# Patient Record
Sex: FEMALE | Race: BLACK OR AFRICAN AMERICAN | NOT HISPANIC OR LATINO
[De-identification: names, ages, dates, MRNs, and addresses within clinical notes are randomized per-mention and may not be internally consistent; named-entity substitution may affect disease eponyms.]

---

## 2017-01-03 ENCOUNTER — MEDICATION RENEWAL (OUTPATIENT)
Age: 82
End: 2017-01-03

## 2017-02-17 ENCOUNTER — MEDICATION RENEWAL (OUTPATIENT)
Age: 82
End: 2017-02-17

## 2017-02-23 ENCOUNTER — LABORATORY RESULT (OUTPATIENT)
Age: 82
End: 2017-02-23

## 2017-02-23 ENCOUNTER — RESULT CHARGE (OUTPATIENT)
Age: 82
End: 2017-02-23

## 2017-02-23 ENCOUNTER — APPOINTMENT (OUTPATIENT)
Dept: INTERNAL MEDICINE | Facility: CLINIC | Age: 82
End: 2017-02-23

## 2017-02-23 VITALS — HEIGHT: 61 IN | BODY MASS INDEX: 21.52 KG/M2 | WEIGHT: 114 LBS

## 2017-02-23 VITALS — DIASTOLIC BLOOD PRESSURE: 60 MMHG | SYSTOLIC BLOOD PRESSURE: 110 MMHG

## 2017-02-23 LAB — SAVE SPECIMEN: NORMAL

## 2017-02-24 LAB
25(OH)D3 SERPL-MCNC: 34.6 NG/ML
ALBUMIN SERPL ELPH-MCNC: 4 G/DL
ALP BLD-CCNC: 80 U/L
ALT SERPL-CCNC: 53 U/L
ANION GAP SERPL CALC-SCNC: 17 MMOL/L
AST SERPL-CCNC: 37 U/L
BASOPHILS # BLD AUTO: 0.01 K/UL
BASOPHILS NFR BLD AUTO: 0.2 %
BILIRUB SERPL-MCNC: 0.9 MG/DL
BUN SERPL-MCNC: 19 MG/DL
CALCIUM SERPL-MCNC: 10.1 MG/DL
CHLORIDE SERPL-SCNC: 101 MMOL/L
CHOLEST SERPL-MCNC: 162 MG/DL
CHOLEST/HDLC SERPL: 2 RATIO
CO2 SERPL-SCNC: 25 MMOL/L
CREAT SERPL-MCNC: 0.69 MG/DL
EOSINOPHIL # BLD AUTO: 0.06 K/UL
EOSINOPHIL NFR BLD AUTO: 1 %
GLUCOSE SERPL-MCNC: 96 MG/DL
HBA1C MFR BLD HPLC: 5.8 %
HCT VFR BLD CALC: 36.5 %
HDLC SERPL-MCNC: 81 MG/DL
HGB BLD-MCNC: 11.6 G/DL
IMM GRANULOCYTES NFR BLD AUTO: 0.2 %
LDLC SERPL CALC-MCNC: 66 MG/DL
LYMPHOCYTES # BLD AUTO: 1.71 K/UL
LYMPHOCYTES NFR BLD AUTO: 28.6 %
MAN DIFF?: NORMAL
MCHC RBC-ENTMCNC: 30.8 PG
MCHC RBC-ENTMCNC: 31.8 GM/DL
MCV RBC AUTO: 96.8 FL
MONOCYTES # BLD AUTO: 0.5 K/UL
MONOCYTES NFR BLD AUTO: 8.4 %
NEUTROPHILS # BLD AUTO: 3.69 K/UL
NEUTROPHILS NFR BLD AUTO: 61.6 %
PLATELET # BLD AUTO: 185 K/UL
POTASSIUM SERPL-SCNC: 4.2 MMOL/L
PROT SERPL-MCNC: 7.4 G/DL
RBC # BLD: 3.77 M/UL
RBC # FLD: 14.4 %
SODIUM SERPL-SCNC: 143 MMOL/L
T3RU NFR SERPL: 1.03 INDEX
T4 SERPL-MCNC: 6.8 UG/DL
TRIGL SERPL-MCNC: 74 MG/DL
TSH SERPL-ACNC: 1.08 UIU/ML
URATE SERPL-MCNC: 3.8 MG/DL
WBC # FLD AUTO: 5.98 K/UL

## 2017-05-25 ENCOUNTER — APPOINTMENT (OUTPATIENT)
Dept: INTERNAL MEDICINE | Facility: CLINIC | Age: 82
End: 2017-05-25

## 2017-05-25 ENCOUNTER — LABORATORY RESULT (OUTPATIENT)
Age: 82
End: 2017-05-25

## 2017-05-25 ENCOUNTER — NON-APPOINTMENT (OUTPATIENT)
Age: 82
End: 2017-05-25

## 2017-05-25 VITALS — HEIGHT: 61 IN | WEIGHT: 112 LBS | BODY MASS INDEX: 21.14 KG/M2

## 2017-05-25 VITALS — DIASTOLIC BLOOD PRESSURE: 60 MMHG | SYSTOLIC BLOOD PRESSURE: 120 MMHG

## 2017-05-26 LAB
25(OH)D3 SERPL-MCNC: 32.3 NG/ML
ALBUMIN SERPL ELPH-MCNC: 4.4 G/DL
ALP BLD-CCNC: 70 U/L
ALT SERPL-CCNC: 20 U/L
ANION GAP SERPL CALC-SCNC: 20 MMOL/L
AST SERPL-CCNC: 25 U/L
BASOPHILS # BLD AUTO: 0.01 K/UL
BASOPHILS NFR BLD AUTO: 0.2 %
BILIRUB SERPL-MCNC: 0.8 MG/DL
BUN SERPL-MCNC: 19 MG/DL
CALCIUM SERPL-MCNC: 9.7 MG/DL
CHLORIDE SERPL-SCNC: 100 MMOL/L
CHOLEST SERPL-MCNC: 163 MG/DL
CHOLEST/HDLC SERPL: 1.9 RATIO
CO2 SERPL-SCNC: 23 MMOL/L
CREAT SERPL-MCNC: 0.65 MG/DL
EOSINOPHIL # BLD AUTO: 0.07 K/UL
EOSINOPHIL NFR BLD AUTO: 1.3 %
FERRITIN SERPL-MCNC: 80 NG/ML
GLUCOSE SERPL-MCNC: 88 MG/DL
HBA1C MFR BLD HPLC: 5.9 %
HCT VFR BLD CALC: 39.6 %
HDLC SERPL-MCNC: 85 MG/DL
HGB BLD-MCNC: 12.5 G/DL
IMM GRANULOCYTES NFR BLD AUTO: 0.2 %
LDLC SERPL CALC-MCNC: 61 MG/DL
LYMPHOCYTES # BLD AUTO: 1.37 K/UL
LYMPHOCYTES NFR BLD AUTO: 24.6 %
MAN DIFF?: NORMAL
MCHC RBC-ENTMCNC: 31.3 PG
MCHC RBC-ENTMCNC: 31.6 GM/DL
MCV RBC AUTO: 99.2 FL
MONOCYTES # BLD AUTO: 0.35 K/UL
MONOCYTES NFR BLD AUTO: 6.3 %
NEUTROPHILS # BLD AUTO: 3.76 K/UL
NEUTROPHILS NFR BLD AUTO: 67.4 %
PLATELET # BLD AUTO: 184 K/UL
POTASSIUM SERPL-SCNC: 4.4 MMOL/L
PROT SERPL-MCNC: 7.7 G/DL
RBC # BLD: 3.99 M/UL
RBC # FLD: 14.3 %
SAVE SPECIMEN: NORMAL
SODIUM SERPL-SCNC: 143 MMOL/L
T3RU NFR SERPL: 1.08 INDEX
T4 SERPL-MCNC: 6.4 UG/DL
TRIGL SERPL-MCNC: 87 MG/DL
TSH SERPL-ACNC: 0.83 UIU/ML
URATE SERPL-MCNC: 3.8 MG/DL
VIT B12 SERPL-MCNC: 804 PG/ML
WBC # FLD AUTO: 5.57 K/UL

## 2017-06-20 ENCOUNTER — MEDICATION RENEWAL (OUTPATIENT)
Age: 82
End: 2017-06-20

## 2017-06-23 ENCOUNTER — APPOINTMENT (OUTPATIENT)
Dept: CARDIOLOGY | Facility: CLINIC | Age: 82
End: 2017-06-23

## 2017-08-18 ENCOUNTER — RX RENEWAL (OUTPATIENT)
Age: 82
End: 2017-08-18

## 2017-09-12 ENCOUNTER — LABORATORY RESULT (OUTPATIENT)
Age: 82
End: 2017-09-12

## 2017-09-12 ENCOUNTER — NON-APPOINTMENT (OUTPATIENT)
Age: 82
End: 2017-09-12

## 2017-09-12 ENCOUNTER — APPOINTMENT (OUTPATIENT)
Dept: INTERNAL MEDICINE | Facility: CLINIC | Age: 82
End: 2017-09-12
Payer: MEDICARE

## 2017-09-12 VITALS — SYSTOLIC BLOOD PRESSURE: 120 MMHG | DIASTOLIC BLOOD PRESSURE: 60 MMHG

## 2017-09-12 VITALS — WEIGHT: 113 LBS | BODY MASS INDEX: 20.8 KG/M2 | HEIGHT: 62 IN

## 2017-09-12 PROCEDURE — 99214 OFFICE O/P EST MOD 30 MIN: CPT | Mod: 25

## 2017-09-12 PROCEDURE — 36415 COLL VENOUS BLD VENIPUNCTURE: CPT

## 2017-09-12 PROCEDURE — 93000 ELECTROCARDIOGRAM COMPLETE: CPT

## 2017-09-13 ENCOUNTER — RESULT CHARGE (OUTPATIENT)
Age: 82
End: 2017-09-13

## 2017-09-13 LAB
25(OH)D3 SERPL-MCNC: 35 NG/ML
ALBUMIN SERPL ELPH-MCNC: 4.3 G/DL
ALP BLD-CCNC: 67 U/L
ALT SERPL-CCNC: 18 U/L
ANION GAP SERPL CALC-SCNC: 15 MMOL/L
AST SERPL-CCNC: 26 U/L
BASOPHILS # BLD AUTO: 0.01 K/UL
BASOPHILS NFR BLD AUTO: 0.2 %
BILIRUB SERPL-MCNC: 0.9 MG/DL
BILIRUB UR QL STRIP: NORMAL
BUN SERPL-MCNC: 17 MG/DL
CALCIUM SERPL-MCNC: 9.9 MG/DL
CHLORIDE SERPL-SCNC: 101 MMOL/L
CHOLEST SERPL-MCNC: 169 MG/DL
CHOLEST/HDLC SERPL: 1.9 RATIO
CLARITY UR: CLEAR
CO2 SERPL-SCNC: 26 MMOL/L
COLLECTION METHOD: NORMAL
CREAT SERPL-MCNC: 0.81 MG/DL
EOSINOPHIL # BLD AUTO: 0.06 K/UL
EOSINOPHIL NFR BLD AUTO: 1 %
GLUCOSE SERPL-MCNC: 100 MG/DL
GLUCOSE UR-MCNC: NORMAL
HBA1C MFR BLD HPLC: 5.6 %
HCG UR QL: 0.2 EU/DL
HCT VFR BLD CALC: 38.5 %
HDLC SERPL-MCNC: 89 MG/DL
HGB BLD-MCNC: 12 G/DL
HGB UR QL STRIP.AUTO: NORMAL
IMM GRANULOCYTES NFR BLD AUTO: 0.2 %
KETONES UR-MCNC: NORMAL
LDLC SERPL CALC-MCNC: 69 MG/DL
LEUKOCYTE ESTERASE UR QL STRIP: NORMAL
LYMPHOCYTES # BLD AUTO: 1.82 K/UL
LYMPHOCYTES NFR BLD AUTO: 30.4 %
MAN DIFF?: NORMAL
MCHC RBC-ENTMCNC: 30.8 PG
MCHC RBC-ENTMCNC: 31.2 GM/DL
MCV RBC AUTO: 99 FL
MONOCYTES # BLD AUTO: 0.68 K/UL
MONOCYTES NFR BLD AUTO: 11.4 %
NEUTROPHILS # BLD AUTO: 3.4 K/UL
NEUTROPHILS NFR BLD AUTO: 56.8 %
NITRITE UR QL STRIP: NORMAL
PH UR STRIP: 5.5
PLATELET # BLD AUTO: 166 K/UL
POTASSIUM SERPL-SCNC: 5 MMOL/L
PROT SERPL-MCNC: 7.6 G/DL
PROT UR STRIP-MCNC: NORMAL
RBC # BLD: 3.89 M/UL
RBC # FLD: 14.4 %
SAVE SPECIMEN: NORMAL
SODIUM SERPL-SCNC: 142 MMOL/L
SP GR UR STRIP: 1.01
T3RU NFR SERPL: 1.02 INDEX
T4 SERPL-MCNC: 7.1 UG/DL
TRIGL SERPL-MCNC: 56 MG/DL
TSH SERPL-ACNC: 1.2 UIU/ML
URATE SERPL-MCNC: 4.7 MG/DL
WBC # FLD AUTO: 5.98 K/UL

## 2017-10-16 ENCOUNTER — APPOINTMENT (OUTPATIENT)
Dept: INTERNAL MEDICINE | Facility: CLINIC | Age: 82
End: 2017-10-16
Payer: MEDICARE

## 2017-10-16 VITALS — SYSTOLIC BLOOD PRESSURE: 120 MMHG | DIASTOLIC BLOOD PRESSURE: 70 MMHG

## 2017-10-16 VITALS — WEIGHT: 111 LBS | BODY MASS INDEX: 20.96 KG/M2 | HEIGHT: 61 IN

## 2017-10-16 PROCEDURE — G0008: CPT

## 2017-10-16 PROCEDURE — 99214 OFFICE O/P EST MOD 30 MIN: CPT | Mod: 25

## 2017-10-16 PROCEDURE — 90686 IIV4 VACC NO PRSV 0.5 ML IM: CPT

## 2017-10-17 ENCOUNTER — MEDICATION RENEWAL (OUTPATIENT)
Age: 82
End: 2017-10-17

## 2017-10-17 ENCOUNTER — RX RENEWAL (OUTPATIENT)
Age: 82
End: 2017-10-17

## 2017-10-17 RX ORDER — LORAZEPAM 0.5 MG/1
0.5 TABLET ORAL AT BEDTIME
Refills: 0 | Status: DISCONTINUED | COMMUNITY
Start: 2017-02-23 | End: 2017-10-17

## 2017-10-17 RX ORDER — LORAZEPAM 0.5 MG/1
0.5 TABLET ORAL TWICE DAILY
Qty: 60 | Refills: 0 | Status: DISCONTINUED | COMMUNITY
Start: 2017-06-20 | End: 2017-10-17

## 2017-11-09 ENCOUNTER — RESULT CHARGE (OUTPATIENT)
Age: 82
End: 2017-11-09

## 2017-11-09 ENCOUNTER — APPOINTMENT (OUTPATIENT)
Dept: INTERNAL MEDICINE | Facility: CLINIC | Age: 82
End: 2017-11-09
Payer: MEDICARE

## 2017-11-09 ENCOUNTER — LABORATORY RESULT (OUTPATIENT)
Age: 82
End: 2017-11-09

## 2017-11-09 ENCOUNTER — NON-APPOINTMENT (OUTPATIENT)
Age: 82
End: 2017-11-09

## 2017-11-09 VITALS — DIASTOLIC BLOOD PRESSURE: 70 MMHG | SYSTOLIC BLOOD PRESSURE: 130 MMHG

## 2017-11-09 VITALS — HEIGHT: 61 IN | WEIGHT: 113 LBS | BODY MASS INDEX: 21.34 KG/M2

## 2017-11-09 PROCEDURE — 36415 COLL VENOUS BLD VENIPUNCTURE: CPT

## 2017-11-09 PROCEDURE — 81003 URINALYSIS AUTO W/O SCOPE: CPT | Mod: QW

## 2017-11-09 PROCEDURE — 99214 OFFICE O/P EST MOD 30 MIN: CPT | Mod: 25

## 2017-11-09 PROCEDURE — 93000 ELECTROCARDIOGRAM COMPLETE: CPT

## 2017-11-09 RX ORDER — LORAZEPAM 0.5 MG/1
0.5 TABLET ORAL TWICE DAILY
Qty: 60 | Refills: 0 | Status: DISCONTINUED | COMMUNITY
Start: 2017-10-17 | End: 2017-11-09

## 2017-11-10 LAB
25(OH)D3 SERPL-MCNC: 39.9 NG/ML
ALBUMIN SERPL ELPH-MCNC: 4.3 G/DL
ALP BLD-CCNC: 62 U/L
ALT SERPL-CCNC: 32 U/L
ANION GAP SERPL CALC-SCNC: 16 MMOL/L
APPEARANCE: CLEAR
AST SERPL-CCNC: 47 U/L
BACTERIA: NEGATIVE
BASOPHILS # BLD AUTO: 0.01 K/UL
BASOPHILS NFR BLD AUTO: 0.2 %
BILIRUB SERPL-MCNC: 0.6 MG/DL
BILIRUBIN URINE: NEGATIVE
BLOOD URINE: NEGATIVE
BUN SERPL-MCNC: 16 MG/DL
CALCIUM SERPL-MCNC: 9.8 MG/DL
CHLORIDE SERPL-SCNC: 99 MMOL/L
CHOLEST SERPL-MCNC: 174 MG/DL
CHOLEST/HDLC SERPL: 1.9 RATIO
CO2 SERPL-SCNC: 26 MMOL/L
COLOR: YELLOW
CREAT SERPL-MCNC: 0.63 MG/DL
EOSINOPHIL # BLD AUTO: 0.04 K/UL
EOSINOPHIL NFR BLD AUTO: 0.8 %
FOLATE SERPL-MCNC: >20 NG/ML
GLUCOSE QUALITATIVE U: NEGATIVE MG/DL
GLUCOSE SERPL-MCNC: 92 MG/DL
HBA1C MFR BLD HPLC: 5.7 %
HCT VFR BLD CALC: 39.5 %
HDLC SERPL-MCNC: 91 MG/DL
HGB BLD-MCNC: 12.6 G/DL
HYALINE CASTS: 1 /LPF
IMM GRANULOCYTES NFR BLD AUTO: 0.2 %
KETONES URINE: NEGATIVE
LDLC SERPL CALC-MCNC: 74 MG/DL
LEUKOCYTE ESTERASE URINE: NEGATIVE
LYMPHOCYTES # BLD AUTO: 1.26 K/UL
LYMPHOCYTES NFR BLD AUTO: 23.6 %
MAN DIFF?: NORMAL
MCHC RBC-ENTMCNC: 31.9 GM/DL
MCHC RBC-ENTMCNC: 32.1 PG
MCV RBC AUTO: 100.8 FL
MICROSCOPIC-UA: NORMAL
MONOCYTES # BLD AUTO: 0.47 K/UL
MONOCYTES NFR BLD AUTO: 8.8 %
NEUTROPHILS # BLD AUTO: 3.54 K/UL
NEUTROPHILS NFR BLD AUTO: 66.4 %
NITRITE URINE: NEGATIVE
PH URINE: 7
PLATELET # BLD AUTO: 166 K/UL
POTASSIUM SERPL-SCNC: 4.3 MMOL/L
PROT SERPL-MCNC: 7.7 G/DL
PROTEIN URINE: NEGATIVE MG/DL
RBC # BLD: 3.92 M/UL
RBC # FLD: 14 %
RED BLOOD CELLS URINE: 1 /HPF
SAVE SPECIMEN: NORMAL
SODIUM SERPL-SCNC: 141 MMOL/L
SPECIFIC GRAVITY URINE: 1.01
SQUAMOUS EPITHELIAL CELLS: 0 /HPF
T3RU NFR SERPL: 1.07 INDEX
T4 SERPL-MCNC: 6.6 UG/DL
TRIGL SERPL-MCNC: 47 MG/DL
TSH SERPL-ACNC: 1.18 UIU/ML
URATE SERPL-MCNC: 3.6 MG/DL
UROBILINOGEN URINE: NEGATIVE MG/DL
VIT B12 SERPL-MCNC: 852 PG/ML
WBC # FLD AUTO: 5.33 K/UL
WHITE BLOOD CELLS URINE: 0 /HPF

## 2017-11-15 ENCOUNTER — RX RENEWAL (OUTPATIENT)
Age: 82
End: 2017-11-15

## 2017-11-16 ENCOUNTER — MEDICATION RENEWAL (OUTPATIENT)
Age: 82
End: 2017-11-16

## 2017-11-22 ENCOUNTER — EMERGENCY (EMERGENCY)
Facility: HOSPITAL | Age: 82
LOS: 1 days | Discharge: HOME | End: 2017-11-22
Attending: EMERGENCY MEDICINE | Admitting: EMERGENCY MEDICINE
Payer: MEDICARE

## 2017-11-22 VITALS
HEART RATE: 78 BPM | SYSTOLIC BLOOD PRESSURE: 114 MMHG | DIASTOLIC BLOOD PRESSURE: 68 MMHG | TEMPERATURE: 98 F | RESPIRATION RATE: 20 BRPM | OXYGEN SATURATION: 100 %

## 2017-11-22 VITALS — RESPIRATION RATE: 18 BRPM | SYSTOLIC BLOOD PRESSURE: 144 MMHG | DIASTOLIC BLOOD PRESSURE: 82 MMHG | HEART RATE: 76 BPM

## 2017-11-22 DIAGNOSIS — Z98.89 OTHER SPECIFIED POSTPROCEDURAL STATES: Chronic | ICD-10-CM

## 2017-11-22 LAB
ANION GAP SERPL CALC-SCNC: 16 MMOL/L — SIGNIFICANT CHANGE UP (ref 5–17)
APPEARANCE UR: CLEAR — SIGNIFICANT CHANGE UP
APTT BLD: 28.4 SEC — SIGNIFICANT CHANGE UP (ref 27.5–37.4)
BASOPHILS # BLD AUTO: 0 K/UL — SIGNIFICANT CHANGE UP (ref 0–0.2)
BASOPHILS NFR BLD AUTO: 0.2 % — SIGNIFICANT CHANGE UP (ref 0–2)
BILIRUB UR-MCNC: NEGATIVE — SIGNIFICANT CHANGE UP
BUN SERPL-MCNC: 15 MG/DL — SIGNIFICANT CHANGE UP (ref 7–23)
CALCIUM SERPL-MCNC: 9.9 MG/DL — SIGNIFICANT CHANGE UP (ref 8.4–10.5)
CHLORIDE SERPL-SCNC: 99 MMOL/L — SIGNIFICANT CHANGE UP (ref 96–108)
CO2 SERPL-SCNC: 25 MMOL/L — SIGNIFICANT CHANGE UP (ref 22–31)
COLOR SPEC: COLORLESS — SIGNIFICANT CHANGE UP
CREAT SERPL-MCNC: 0.52 MG/DL — SIGNIFICANT CHANGE UP (ref 0.5–1.3)
DIFF PNL FLD: NEGATIVE — SIGNIFICANT CHANGE UP
EOSINOPHIL # BLD AUTO: 0 K/UL — SIGNIFICANT CHANGE UP (ref 0–0.5)
EOSINOPHIL NFR BLD AUTO: 0.6 % — SIGNIFICANT CHANGE UP (ref 0–6)
GLUCOSE SERPL-MCNC: 107 MG/DL — HIGH (ref 70–99)
GLUCOSE UR QL: NEGATIVE — SIGNIFICANT CHANGE UP
HCT VFR BLD CALC: 40.5 % — SIGNIFICANT CHANGE UP (ref 34.5–45)
HGB BLD-MCNC: 13.2 G/DL — SIGNIFICANT CHANGE UP (ref 11.5–15.5)
INR BLD: 1.08 RATIO — SIGNIFICANT CHANGE UP (ref 0.88–1.16)
KETONES UR-MCNC: NEGATIVE — SIGNIFICANT CHANGE UP
LEUKOCYTE ESTERASE UR-ACNC: NEGATIVE — SIGNIFICANT CHANGE UP
LYMPHOCYTES # BLD AUTO: 1.1 K/UL — SIGNIFICANT CHANGE UP (ref 1–3.3)
LYMPHOCYTES # BLD AUTO: 25.2 % — SIGNIFICANT CHANGE UP (ref 13–44)
MCHC RBC-ENTMCNC: 32.6 GM/DL — SIGNIFICANT CHANGE UP (ref 32–36)
MCHC RBC-ENTMCNC: 33.2 PG — SIGNIFICANT CHANGE UP (ref 27–34)
MCV RBC AUTO: 102 FL — HIGH (ref 80–100)
MONOCYTES # BLD AUTO: 0.4 K/UL — SIGNIFICANT CHANGE UP (ref 0–0.9)
MONOCYTES NFR BLD AUTO: 9 % — SIGNIFICANT CHANGE UP (ref 2–14)
NEUTROPHILS # BLD AUTO: 2.9 K/UL — SIGNIFICANT CHANGE UP (ref 1.8–7.4)
NEUTROPHILS NFR BLD AUTO: 64.9 % — SIGNIFICANT CHANGE UP (ref 43–77)
NITRITE UR-MCNC: NEGATIVE — SIGNIFICANT CHANGE UP
PH UR: 7.5 — SIGNIFICANT CHANGE UP (ref 5–8)
PLATELET # BLD AUTO: 161 K/UL — SIGNIFICANT CHANGE UP (ref 150–400)
POTASSIUM SERPL-MCNC: 4.4 MMOL/L — SIGNIFICANT CHANGE UP (ref 3.5–5.3)
POTASSIUM SERPL-SCNC: 4.4 MMOL/L — SIGNIFICANT CHANGE UP (ref 3.5–5.3)
PROT UR-MCNC: NEGATIVE — SIGNIFICANT CHANGE UP
PROTHROM AB SERPL-ACNC: 11.8 SEC — SIGNIFICANT CHANGE UP (ref 9.8–12.7)
RBC # BLD: 3.98 M/UL — SIGNIFICANT CHANGE UP (ref 3.8–5.2)
RBC # FLD: 11.7 % — SIGNIFICANT CHANGE UP (ref 10.3–14.5)
SODIUM SERPL-SCNC: 140 MMOL/L — SIGNIFICANT CHANGE UP (ref 135–145)
SP GR SPEC: 1.01 — LOW (ref 1.01–1.02)
UROBILINOGEN FLD QL: NEGATIVE — SIGNIFICANT CHANGE UP
WBC # BLD: 4.5 K/UL — SIGNIFICANT CHANGE UP (ref 3.8–10.5)
WBC # FLD AUTO: 4.5 K/UL — SIGNIFICANT CHANGE UP (ref 3.8–10.5)
WBC UR QL: SIGNIFICANT CHANGE UP /HPF (ref 0–5)

## 2017-11-22 PROCEDURE — 85027 COMPLETE CBC AUTOMATED: CPT

## 2017-11-22 PROCEDURE — 73564 X-RAY EXAM KNEE 4 OR MORE: CPT | Mod: 26,LT

## 2017-11-22 PROCEDURE — 73552 X-RAY EXAM OF FEMUR 2/>: CPT

## 2017-11-22 PROCEDURE — 85730 THROMBOPLASTIN TIME PARTIAL: CPT

## 2017-11-22 PROCEDURE — 70450 CT HEAD/BRAIN W/O DYE: CPT

## 2017-11-22 PROCEDURE — 93010 ELECTROCARDIOGRAM REPORT: CPT

## 2017-11-22 PROCEDURE — 73552 X-RAY EXAM OF FEMUR 2/>: CPT | Mod: 26,LT

## 2017-11-22 PROCEDURE — 85610 PROTHROMBIN TIME: CPT

## 2017-11-22 PROCEDURE — 71010: CPT | Mod: 26

## 2017-11-22 PROCEDURE — 71045 X-RAY EXAM CHEST 1 VIEW: CPT

## 2017-11-22 PROCEDURE — 93005 ELECTROCARDIOGRAM TRACING: CPT

## 2017-11-22 PROCEDURE — 96374 THER/PROPH/DIAG INJ IV PUSH: CPT

## 2017-11-22 PROCEDURE — 99285 EMERGENCY DEPT VISIT HI MDM: CPT | Mod: 25

## 2017-11-22 PROCEDURE — 99284 EMERGENCY DEPT VISIT MOD MDM: CPT | Mod: 25

## 2017-11-22 PROCEDURE — 80048 BASIC METABOLIC PNL TOTAL CA: CPT

## 2017-11-22 PROCEDURE — 72170 X-RAY EXAM OF PELVIS: CPT

## 2017-11-22 PROCEDURE — 96375 TX/PRO/DX INJ NEW DRUG ADDON: CPT

## 2017-11-22 PROCEDURE — 81001 URINALYSIS AUTO W/SCOPE: CPT

## 2017-11-22 PROCEDURE — 70450 CT HEAD/BRAIN W/O DYE: CPT | Mod: 26

## 2017-11-22 PROCEDURE — 72170 X-RAY EXAM OF PELVIS: CPT | Mod: 26

## 2017-11-22 PROCEDURE — 73564 X-RAY EXAM KNEE 4 OR MORE: CPT

## 2017-11-22 RX ORDER — TRAMADOL HYDROCHLORIDE 50 MG/1
1 TABLET ORAL
Qty: 12 | Refills: 0 | OUTPATIENT
Start: 2017-11-22 | End: 2017-11-25

## 2017-11-22 RX ORDER — MORPHINE SULFATE 50 MG/1
2 CAPSULE, EXTENDED RELEASE ORAL ONCE
Qty: 0 | Refills: 0 | Status: DISCONTINUED | OUTPATIENT
Start: 2017-11-22 | End: 2017-11-22

## 2017-11-22 RX ORDER — TRAMADOL HYDROCHLORIDE 50 MG/1
50 TABLET ORAL ONCE
Qty: 0 | Refills: 0 | Status: DISCONTINUED | OUTPATIENT
Start: 2017-11-22 | End: 2017-11-22

## 2017-11-22 RX ORDER — ACETAMINOPHEN 500 MG
1000 TABLET ORAL ONCE
Qty: 0 | Refills: 0 | Status: COMPLETED | OUTPATIENT
Start: 2017-11-22 | End: 2017-11-22

## 2017-11-22 RX ADMIN — Medication 400 MILLIGRAM(S): at 19:45

## 2017-11-22 RX ADMIN — Medication 1000 MILLIGRAM(S): at 20:44

## 2017-11-22 RX ADMIN — TRAMADOL HYDROCHLORIDE 50 MILLIGRAM(S): 50 TABLET ORAL at 23:50

## 2017-11-22 RX ADMIN — MORPHINE SULFATE 2 MILLIGRAM(S): 50 CAPSULE, EXTENDED RELEASE ORAL at 19:53

## 2017-11-22 RX ADMIN — TRAMADOL HYDROCHLORIDE 50 MILLIGRAM(S): 50 TABLET ORAL at 23:52

## 2017-11-22 RX ADMIN — MORPHINE SULFATE 2 MILLIGRAM(S): 50 CAPSULE, EXTENDED RELEASE ORAL at 20:45

## 2017-11-22 NOTE — ED PROVIDER NOTE - OBJECTIVE STATEMENT
87 yo female presenting after mechanical fall x 2 today.  first fall was in the afternoon, patient stumbled while trying to get into [facility], fell on the ground on her buttocks however got up right away and was able to continue walking.  second fall happened around 530 pm today which prompted her to come in.  was getting out of the car, lost her balance and landed on her left knee.  was not able to get up from the second fall.     pcp- alma rosa rivers 89 yo female presenting after mechanical fall x 2 today.  first fall was in the afternoon, patient stumbled while trying to get into [facility], fell on the ground on her buttocks however got up right away and was able to continue walking.  second fall happened around 530 pm today which prompted her to come in.  was getting out of the car, lost her balance and landed on her left knee.  was not able to get up from the second fall. No head strike, no neck pain, no back pain.    pcp- alma rosa rivers

## 2017-11-22 NOTE — ED PROVIDER NOTE - PLAN OF CARE
Please follow up with Dr. Lugo on Monday in regards to your symptoms or as soon as you can next week.  The number for his clinic is [phone].  Please take tylenol, 1000mg, every 6 hours as needed for pain.  You may take tramadol, 50 mg every 6 hours as needed for breakthrough pain.  Drink at least 1 Liter or 32 Ounces of water each day.  Return for any persistent, worsening symptoms, or ANY concerns at all. left patella, displaced

## 2017-11-22 NOTE — ED PROCEDURE NOTE - ATTENDING CONTRIBUTION TO CARE
ATTENDING MD:  I, Neil Pablo, was available in the Emergency Department throughout the entire procedure and I was present during the key portions. I agree with the procedure as documented.

## 2017-11-22 NOTE — ED PROVIDER NOTE - MEDICAL DECISION MAKING DETAILS
87 yo female presenting after mechanical fall; rule out fx of left knee; pain control xrays ---> re eval

## 2017-11-22 NOTE — ED PROCEDURE NOTE - PROCEDURE ADDITIONAL DETAILS
Demonstrated stable ambulation with walker able to get up, ambulate, turn. Does not appear unsteady.

## 2017-11-22 NOTE — ED PROCEDURE NOTE - NS ED PERI VASCULAR NEG
no changes pre- or post-procedure/capillary refill time < 2 seconds/no swelling/no cyanosis of extremity/no paresthesia/fingers/toes warm to touch

## 2017-11-22 NOTE — ED PROVIDER NOTE - PROGRESS NOTE DETAILS
pain well controlled -nabila patient able to ambulate with walker. pain controlled -nabila patient able to ambulate with walker. able to get out of bed, walk 10 steps stably, turn around and walk self back to bed with ease. no concern that injury will cause instability leading to further fall. pain controlled

## 2017-11-22 NOTE — ED PROVIDER NOTE - PHYSICAL EXAMINATION
right and left dp pulse palpable, cap refill < 2 seconds bilaterally in feet.  swelling and tenderness over the medial and lateral aspect of the left knee with mild bruising appreciated over the patella, no obvious deformities appreciated, quad tendon intact of left knee, able to passively obtain full rom after pain medication. right and left dp pulse palpable, cap refill < 2 seconds bilaterally in feet.  swelling and tenderness over the medial and lateral aspect of the left knee with mild bruising appreciated over the patella, no obvious deformities appreciated, quad tendon intact of left knee, able to passively obtain full rom after pain medication.  negative left straight leg test

## 2017-11-22 NOTE — ED PROVIDER NOTE - ATTENDING CONTRIBUTION TO CARE
ATTENDING MD:  Neil ELIAS, personally have seen and examined this patient.  I have discussed all aspects of care with the resident physician. Resident note reviewed and agree on plan of care and except where noted.  See HPI, PE, and MDM for details.    GEN: uncomfortable, AOx4  HEENT: NCAT, pupils equal round and reactive to light, extra-occular movements are intact, mucus membranes are moist, oropharynx is within normal limits, neck supple  CV: normal rate, regular rhythm, 2+ radial, DP and PT symmetric bilaterally  RESP:: chest nontender, lungs clear to auscultation bilaterally, equal breath sounds bilaterally  ABD: soft, nontender  : no CVAT  MSK: no spinal tenderness, neck and back ranged painlessly, pelvis stable, no UE abnormalities, LLE with hematoma and TTP over patella and distal femur, extensors do not appear to be intact  NEURO: CNII-XII intact, strength and sensation intact (limited assessment of knee strength but intact distally and proximally), coordination intact  SKIN: no lacerations, warm, dry, normal for ethnicity    MDM: s/p fall, r/o knee and femur fx, pelvis fx CXR as part of preopoerative woorkup. given age and multipel falls, will get head CT as cannot clear by [place] criteria though low suspicion for intracranial injury

## 2017-11-22 NOTE — ED PROVIDER NOTE - CARDIAC, MLM
Normal rate, regular rhythm.  Heart sounds S1, S2.  No murmurs, rubs or gallops. no chest wall tenderness

## 2017-11-22 NOTE — ED ADULT NURSE NOTE - OBJECTIVE STATEMENT
88 y.o female pmh HTN, paranoia, on lasix BIBA from home c/o left knee pain s/p fall while getting out of her car. pt states neighbor called the ambulance upon seeing pt fall while getting out of her car. pt denies any head injury or loc. states she lost her balance and tripped when getting out of the car. denies any other complaints of pain or discomfort. left knee wrapped  by EMS. VS stable. pt denies any dizziness, cp, or weakness prior to the fall. labs and line obtained. safety and fall precautions maintained. call bell at the bedside.

## 2017-11-22 NOTE — ED PROVIDER NOTE - NS ED ROS FT
Constitutional: no fever  Eyes: no conjunctivitis  Ears: no ear pain   Nose: no nasal congestion, Mouth/Throat: no throat pain, Neck: no stiffness  Cardiovascular: no chest pain  Chest: no cough  Gastrointestinal: no abdominal pain, no vomiting and diarrhea  MSK: no joint pain  : no dysuria  Skin: no rash  Neuro: no LOC Constitutional: no fever  Eyes: no conjunctivitis  Ears: no ear pain   Nose: no nasal congestion, Mouth/Throat: no throat pain, Neck: no stiffness  Cardiovascular: no chest pain  Chest: no cough  Gastrointestinal: no abdominal pain, no vomiting and diarrhea  MSK: see HPI  : no dysuria  Skin: no rash  Neuro: no LOC

## 2017-11-23 NOTE — CONSULT NOTE ADULT - ASSESSMENT
88F with left patella fracture    FU CT left knee  WBAT in knee immobilizer left LE  Pain control  Neurovascularly intact post brace  Keep brace clean, dry, and intact  Rest, ice, and elevate affected extremity  Discussed signs/symptoms of compartment syndrome  Discussed possible need for surgical fixation  No planned orthopedic intervention at this time  Ortho stable for discharge  Follow up in office on Monday with Dr. Barone  Will discuss with attending and advise if change

## 2017-11-23 NOTE — CONSULT NOTE ADULT - SUBJECTIVE AND OBJECTIVE BOX
HPI  88F complains of left knee pain for 1 day status post mechanical fall. Patient denies numbness, paresthesias, headstrike, loss of consciousness, or any other signs/symptoms at this time.     Allergies: cipro, penicillin  PMH: HLD, HTN  PSH: Breast biopsy  Social: Denies tobacco use  FH: Noncontributory  Imaging: XR left knee - patella fracture    ROS: Negative for all systems except as noted above in HPI    Physical exam  VS: Afebrile, vital signs stable  Gen: NAD  left LE: Skin intact. No erythema/ecchymosis/warmth. Large effusion knee. No TTP bony prominences at ankle/foot.+EHL/FHL/TA/GS. SILT L3-S1, +DP pulse, capillary refill brisk. Compartments soft and nontender. Unable to SLR.  Secondary survey: No TTP bony prominences with full painless AROM at baseline per patient. SILT. Capillary refill brisk. Able to SLR with contralateral leg. No TTP axial spine.

## 2017-11-27 ENCOUNTER — APPOINTMENT (OUTPATIENT)
Dept: ORTHOPEDIC SURGERY | Facility: CLINIC | Age: 82
End: 2017-11-27
Payer: MEDICARE

## 2017-11-27 ENCOUNTER — APPOINTMENT (OUTPATIENT)
Dept: INTERNAL MEDICINE | Facility: CLINIC | Age: 82
End: 2017-11-27
Payer: MEDICARE

## 2017-11-27 VITALS
HEART RATE: 69 BPM | WEIGHT: 113 LBS | HEIGHT: 61 IN | BODY MASS INDEX: 21.34 KG/M2 | DIASTOLIC BLOOD PRESSURE: 69 MMHG | SYSTOLIC BLOOD PRESSURE: 111 MMHG

## 2017-11-27 VITALS
HEIGHT: 61 IN | WEIGHT: 114 LBS | BODY MASS INDEX: 21.52 KG/M2 | HEART RATE: 73 BPM | DIASTOLIC BLOOD PRESSURE: 66 MMHG | SYSTOLIC BLOOD PRESSURE: 119 MMHG

## 2017-11-27 PROCEDURE — 99203 OFFICE O/P NEW LOW 30 MIN: CPT

## 2017-11-27 PROCEDURE — 99214 OFFICE O/P EST MOD 30 MIN: CPT

## 2017-11-28 ENCOUNTER — OUTPATIENT (OUTPATIENT)
Dept: OUTPATIENT SERVICES | Facility: HOSPITAL | Age: 82
LOS: 1 days | End: 2017-11-28

## 2017-11-28 VITALS
DIASTOLIC BLOOD PRESSURE: 54 MMHG | HEIGHT: 62 IN | HEART RATE: 90 BPM | TEMPERATURE: 98 F | OXYGEN SATURATION: 99 % | RESPIRATION RATE: 16 BRPM | WEIGHT: 113.76 LBS | SYSTOLIC BLOOD PRESSURE: 96 MMHG

## 2017-11-28 DIAGNOSIS — I10 ESSENTIAL (PRIMARY) HYPERTENSION: ICD-10-CM

## 2017-11-28 DIAGNOSIS — Z98.89 OTHER SPECIFIED POSTPROCEDURAL STATES: Chronic | ICD-10-CM

## 2017-11-28 DIAGNOSIS — S82.032A DISPLACED TRANSVERSE FRACTURE OF LEFT PATELLA, INITIAL ENCOUNTER FOR CLOSED FRACTURE: ICD-10-CM

## 2017-11-28 DIAGNOSIS — G47.00 INSOMNIA, UNSPECIFIED: ICD-10-CM

## 2017-11-28 DIAGNOSIS — Z98.49 CATARACT EXTRACTION STATUS, UNSPECIFIED EYE: Chronic | ICD-10-CM

## 2017-11-28 DIAGNOSIS — E78.5 HYPERLIPIDEMIA, UNSPECIFIED: ICD-10-CM

## 2017-11-28 DIAGNOSIS — F22 DELUSIONAL DISORDERS: ICD-10-CM

## 2017-11-28 DIAGNOSIS — T78.40XA ALLERGY, UNSPECIFIED, INITIAL ENCOUNTER: ICD-10-CM

## 2017-11-28 LAB
APPEARANCE UR: CLEAR — SIGNIFICANT CHANGE UP
BACTERIA # UR AUTO: SIGNIFICANT CHANGE UP
BILIRUB UR-MCNC: NEGATIVE — SIGNIFICANT CHANGE UP
BLD GP AB SCN SERPL QL: NEGATIVE — SIGNIFICANT CHANGE UP
BLOOD UR QL VISUAL: NEGATIVE — SIGNIFICANT CHANGE UP
COD CRY URNS QL: SIGNIFICANT CHANGE UP (ref 0–0)
COLOR SPEC: YELLOW — SIGNIFICANT CHANGE UP
GLUCOSE UR-MCNC: NEGATIVE — SIGNIFICANT CHANGE UP
KETONES UR-MCNC: NEGATIVE — SIGNIFICANT CHANGE UP
LEUKOCYTE ESTERASE UR-ACNC: NEGATIVE — SIGNIFICANT CHANGE UP
MUCOUS THREADS # UR AUTO: SIGNIFICANT CHANGE UP
NITRITE UR-MCNC: NEGATIVE — SIGNIFICANT CHANGE UP
PH UR: 5.5 — SIGNIFICANT CHANGE UP (ref 4.6–8)
PROT UR-MCNC: 20 — SIGNIFICANT CHANGE UP
RBC CASTS # UR COMP ASSIST: SIGNIFICANT CHANGE UP (ref 0–?)
RH IG SCN BLD-IMP: POSITIVE — SIGNIFICANT CHANGE UP
SP GR SPEC: 1.02 — SIGNIFICANT CHANGE UP (ref 1–1.03)
SQUAMOUS # UR AUTO: SIGNIFICANT CHANGE UP
UROBILINOGEN FLD QL: 1 E.U. — SIGNIFICANT CHANGE UP (ref 0.1–0.2)
WBC UR QL: SIGNIFICANT CHANGE UP (ref 0–?)

## 2017-11-28 RX ORDER — ASPIRIN/CALCIUM CARB/MAGNESIUM 324 MG
1 TABLET ORAL
Qty: 0 | Refills: 0 | COMMUNITY

## 2017-11-28 NOTE — H&P PST ADULT - NEGATIVE PSYCHIATRIC SYMPTOMS
no suicidal ideation/no depression/no insomnia/no memory loss/no agitation/no anxiety/no paranoia/no mood swings/no auditory hallucinations/no hyperactivity

## 2017-11-28 NOTE — H&P PST ADULT - VENOUS THROMBOEMBOLISM CURRENT STATUS
major surgery, including arthroscopic and laparoscopic (greater than 1 hr)/major surgery lasting 2-3 hrs

## 2017-11-28 NOTE — H&P PST ADULT - PROBLEM SELECTOR PLAN 1
Pt is scheduled for a left knee partial patellectomy on 11/29/17. Preop instructions provided including NPO status, Pepcid / Hibiclens protocol, hcp form to be filled. verbalized understanding. Pt stopped ASA on 11/27, MVI and fish oil last dose in am 11/28.

## 2017-11-28 NOTE — H&P PST ADULT - NEGATIVE CARDIOVASCULAR SYMPTOMS
no dyspnea on exertion/no claudication/no palpitations/no orthopnea/no chest pain/no paroxysmal nocturnal dyspnea/no peripheral edema

## 2017-11-28 NOTE — H&P PST ADULT - NEGATIVE OPHTHALMOLOGIC SYMPTOMS
no diplopia/no loss of vision R/no scleral injection L/no discharge L/no pain R/no blurred vision R/no discharge R/no loss of vision L/no scleral injection R/no pain L/no irritation R/no photophobia/no blurred vision L/no lacrimation L/no lacrimation R

## 2017-11-28 NOTE — H&P PST ADULT - PROBLEM SELECTOR PLAN 2
Pt instructed to continue Atenolol, benazapril, amlodipine and take in am of sx.  Pt will hold HCTZ due to diuresis.

## 2017-11-28 NOTE — H&P PST ADULT - MUSCULOSKELETAL COMMENTS
DX: DX: displaced transverse fracture left patella, initial encounter for close fracture left knee ace wrap and knee brace in place. DX: displaced transverse fracture left patella, initial encounter for close fracture

## 2017-11-28 NOTE — H&P PST ADULT - NEGATIVE GENERAL GENITOURINARY SYMPTOMS
no flank pain L/no incontinence/no flank pain R/no urine discoloration/no gas in urine/no hematuria/no renal colic/no bladder infections/no dysuria

## 2017-11-28 NOTE — H&P PST ADULT - PMH
Delusional disorder    Hyperlipidemia    Hypertension Cataracts, bilateral    Delusional disorder    Hyperlipidemia    Hypertension

## 2017-11-28 NOTE — H&P PST ADULT - NEUROLOGICAL COMMENTS
uses walker due to injury, denies any assistive device used prior uses walker due to injury, denies any assistive device used prior to injury

## 2017-11-28 NOTE — H&P PST ADULT - HISTORY OF PRESENT ILLNESS
89 y/o female with PMH of HTN, HLD and idea of reference. Presents to PST with a preop dx of displaced transverse fracture left patella, initial encounter for close fracture and to be evaluated for a scheduled left knee partial patellectomy on 11/29/17. Pt and Son juan state felt twice on 11/22/17. Denies loosing consciousness (mechanical falls), pt states lost balance. Pt went to [facility] ER were BW, CT scan of head and knee and chest xrays were done. Knee xray revealed a patella fracture, pt went to see Dr Valero after d/inge from hospital and urgent surgical intervention was recommended at this time for which is now scheduled.

## 2017-11-28 NOTE — H&P PST ADULT - NEGATIVE GENERAL SYMPTOMS
no anorexia/no fatigue/no malaise/no weight gain/no polyuria/no polydipsia/no polyphagia/no fever/no sweating/no chills

## 2017-11-28 NOTE — H&P PST ADULT - NEGATIVE ALLERGY TYPES
no reactions to food/no outdoor environmental allergies/no indoor environmental allergies/no reactions to animals/no reactions to insect bites

## 2017-11-28 NOTE — H&P PST ADULT - NSANTHOSAYNRD_GEN_A_CORE
never tested/No. DESIRE screening performed.  STOP BANG Legend: 0-2 = LOW Risk; 3-4 = INTERMEDIATE Risk; 5-8 = HIGH Risk

## 2017-11-28 NOTE — H&P PST ADULT - RS GEN PE MLT RESP DETAILS PC
no rales/clear to auscultation bilaterally/good air movement/breath sounds equal/no wheezes/no intercostal retractions/no rhonchi/respirations non-labored/airway patent

## 2017-11-28 NOTE — H&P PST ADULT - NEGATIVE GASTROINTESTINAL SYMPTOMS
no vomiting/no diarrhea/no constipation/no abdominal pain/no melena/no hematochezia/no change in bowel habits/no nausea

## 2017-11-28 NOTE — H&P PST ADULT - NEGATIVE NEUROLOGICAL SYMPTOMS
no hemiparesis/no transient paralysis/no focal seizures/no syncope/no weakness/no loss of consciousness/no generalized seizures/no facial palsy/no tremors/no headache/no paresthesias/no vertigo/no loss of sensation/no confusion

## 2017-11-29 ENCOUNTER — INPATIENT (INPATIENT)
Facility: HOSPITAL | Age: 82
LOS: 0 days | Discharge: HOME | End: 2017-11-29
Attending: ORTHOPAEDIC SURGERY | Admitting: ORTHOPAEDIC SURGERY
Payer: MEDICARE

## 2017-11-29 ENCOUNTER — INPATIENT (INPATIENT)
Facility: HOSPITAL | Age: 82
LOS: 6 days | Discharge: NURSING, REHAB OR LONG-TERM CARE | End: 2017-12-06
Attending: INTERNAL MEDICINE | Admitting: INTERNAL MEDICINE
Payer: MEDICARE

## 2017-11-29 ENCOUNTER — APPOINTMENT (OUTPATIENT)
Dept: ORTHOPEDIC SURGERY | Facility: HOSPITAL | Age: 82
End: 2017-11-29

## 2017-11-29 VITALS
DIASTOLIC BLOOD PRESSURE: 64 MMHG | HEART RATE: 146 BPM | TEMPERATURE: 98 F | SYSTOLIC BLOOD PRESSURE: 90 MMHG | HEIGHT: 62 IN | WEIGHT: 113.76 LBS | OXYGEN SATURATION: 98 % | RESPIRATION RATE: 14 BRPM

## 2017-11-29 VITALS
TEMPERATURE: 98 F | RESPIRATION RATE: 16 BRPM | HEART RATE: 144 BPM | DIASTOLIC BLOOD PRESSURE: 31 MMHG | SYSTOLIC BLOOD PRESSURE: 115 MMHG | OXYGEN SATURATION: 99 %

## 2017-11-29 VITALS
OXYGEN SATURATION: 97 % | SYSTOLIC BLOOD PRESSURE: 129 MMHG | DIASTOLIC BLOOD PRESSURE: 77 MMHG | RESPIRATION RATE: 14 BRPM | HEART RATE: 129 BPM

## 2017-11-29 DIAGNOSIS — Z98.49 CATARACT EXTRACTION STATUS, UNSPECIFIED EYE: Chronic | ICD-10-CM

## 2017-11-29 DIAGNOSIS — S82.032A DISPLACED TRANSVERSE FRACTURE OF LEFT PATELLA, INITIAL ENCOUNTER FOR CLOSED FRACTURE: ICD-10-CM

## 2017-11-29 DIAGNOSIS — Z98.89 OTHER SPECIFIED POSTPROCEDURAL STATES: Chronic | ICD-10-CM

## 2017-11-29 LAB
ALBUMIN SERPL ELPH-MCNC: 3.3 G/DL — SIGNIFICANT CHANGE UP (ref 3.3–5)
ALP SERPL-CCNC: 53 U/L — SIGNIFICANT CHANGE UP (ref 40–120)
ALT FLD-CCNC: 20 U/L — SIGNIFICANT CHANGE UP (ref 4–33)
AST SERPL-CCNC: 23 U/L — SIGNIFICANT CHANGE UP (ref 4–32)
BASE EXCESS BLDV CALC-SCNC: 3 MMOL/L — SIGNIFICANT CHANGE UP
BASOPHILS # BLD AUTO: 0.01 K/UL — SIGNIFICANT CHANGE UP (ref 0–0.2)
BASOPHILS NFR BLD AUTO: 0.1 % — SIGNIFICANT CHANGE UP (ref 0–2)
BILIRUB SERPL-MCNC: 1.5 MG/DL — HIGH (ref 0.2–1.2)
BLOOD GAS VENOUS - CREATININE: 0.46 MG/DL — LOW (ref 0.5–1.3)
BUN SERPL-MCNC: 19 MG/DL — SIGNIFICANT CHANGE UP (ref 7–23)
CALCIUM SERPL-MCNC: 8.5 MG/DL — SIGNIFICANT CHANGE UP (ref 8.4–10.5)
CHLORIDE BLDV-SCNC: 105 MMOL/L — SIGNIFICANT CHANGE UP (ref 96–108)
CHLORIDE SERPL-SCNC: 103 MMOL/L — SIGNIFICANT CHANGE UP (ref 98–107)
CO2 SERPL-SCNC: 25 MMOL/L — SIGNIFICANT CHANGE UP (ref 22–31)
CREAT SERPL-MCNC: 0.57 MG/DL — SIGNIFICANT CHANGE UP (ref 0.5–1.3)
EOSINOPHIL # BLD AUTO: 0.02 K/UL — SIGNIFICANT CHANGE UP (ref 0–0.5)
EOSINOPHIL NFR BLD AUTO: 0.3 % — SIGNIFICANT CHANGE UP (ref 0–6)
GAS PNL BLDV: 139 MMOL/L — SIGNIFICANT CHANGE UP (ref 136–146)
GLUCOSE BLDV-MCNC: 112 — HIGH (ref 70–99)
GLUCOSE SERPL-MCNC: 114 MG/DL — HIGH (ref 70–99)
HCO3 BLDV-SCNC: 27 MMOL/L — SIGNIFICANT CHANGE UP (ref 20–27)
HCT VFR BLD CALC: 29 % — LOW (ref 34.5–45)
HCT VFR BLDV CALC: 30.2 % — LOW (ref 34.5–45)
HGB BLD-MCNC: 9.5 G/DL — LOW (ref 11.5–15.5)
HGB BLDV-MCNC: 9.8 G/DL — LOW (ref 11.5–15.5)
IMM GRANULOCYTES # BLD AUTO: 0.03 # — SIGNIFICANT CHANGE UP
IMM GRANULOCYTES NFR BLD AUTO: 0.4 % — SIGNIFICANT CHANGE UP (ref 0–1.5)
LACTATE BLDV-MCNC: 2.6 MMOL/L — HIGH (ref 0.5–2)
LYMPHOCYTES # BLD AUTO: 1.21 K/UL — SIGNIFICANT CHANGE UP (ref 1–3.3)
LYMPHOCYTES # BLD AUTO: 15.8 % — SIGNIFICANT CHANGE UP (ref 13–44)
MCHC RBC-ENTMCNC: 31.8 PG — SIGNIFICANT CHANGE UP (ref 27–34)
MCHC RBC-ENTMCNC: 32.8 % — SIGNIFICANT CHANGE UP (ref 32–36)
MCV RBC AUTO: 97 FL — SIGNIFICANT CHANGE UP (ref 80–100)
MONOCYTES # BLD AUTO: 0.77 K/UL — SIGNIFICANT CHANGE UP (ref 0–0.9)
MONOCYTES NFR BLD AUTO: 10.1 % — SIGNIFICANT CHANGE UP (ref 2–14)
NEUTROPHILS # BLD AUTO: 5.62 K/UL — SIGNIFICANT CHANGE UP (ref 1.8–7.4)
NEUTROPHILS NFR BLD AUTO: 73.3 % — SIGNIFICANT CHANGE UP (ref 43–77)
NRBC # FLD: 0 — SIGNIFICANT CHANGE UP
OB PNL STL: NEGATIVE — SIGNIFICANT CHANGE UP
PCO2 BLDV: 44 MMHG — SIGNIFICANT CHANGE UP (ref 41–51)
PH BLDV: 7.41 PH — SIGNIFICANT CHANGE UP (ref 7.32–7.43)
PLATELET # BLD AUTO: 224 K/UL — SIGNIFICANT CHANGE UP (ref 150–400)
PMV BLD: 9.8 FL — SIGNIFICANT CHANGE UP (ref 7–13)
PO2 BLDV: 42 MMHG — HIGH (ref 35–40)
POTASSIUM BLDV-SCNC: 3.9 MMOL/L — SIGNIFICANT CHANGE UP (ref 3.4–4.5)
POTASSIUM SERPL-MCNC: 4.3 MMOL/L — SIGNIFICANT CHANGE UP (ref 3.5–5.3)
POTASSIUM SERPL-SCNC: 4.3 MMOL/L — SIGNIFICANT CHANGE UP (ref 3.5–5.3)
PROT SERPL-MCNC: 6.3 G/DL — SIGNIFICANT CHANGE UP (ref 6–8.3)
RBC # BLD: 2.99 M/UL — LOW (ref 3.8–5.2)
RBC # FLD: 13.5 % — SIGNIFICANT CHANGE UP (ref 10.3–14.5)
SAO2 % BLDV: 73.2 % — SIGNIFICANT CHANGE UP (ref 60–85)
SODIUM SERPL-SCNC: 140 MMOL/L — SIGNIFICANT CHANGE UP (ref 135–145)
SPECIMEN SOURCE: SIGNIFICANT CHANGE UP
SPECIMEN SOURCE: SIGNIFICANT CHANGE UP
TROPONIN T SERPL-MCNC: < 0.06 NG/ML — SIGNIFICANT CHANGE UP (ref 0–0.06)
TSH SERPL-MCNC: 1.09 UIU/ML — SIGNIFICANT CHANGE UP (ref 0.27–4.2)
WBC # BLD: 7.66 K/UL — SIGNIFICANT CHANGE UP (ref 3.8–10.5)
WBC # FLD AUTO: 7.66 K/UL — SIGNIFICANT CHANGE UP (ref 3.8–10.5)

## 2017-11-29 PROCEDURE — 93010 ELECTROCARDIOGRAM REPORT: CPT

## 2017-11-29 RX ORDER — SODIUM CHLORIDE 9 MG/ML
2000 INJECTION INTRAMUSCULAR; INTRAVENOUS; SUBCUTANEOUS ONCE
Qty: 0 | Refills: 0 | Status: COMPLETED | OUTPATIENT
Start: 2017-11-29 | End: 2017-11-29

## 2017-11-29 RX ORDER — SODIUM CHLORIDE 9 MG/ML
1000 INJECTION, SOLUTION INTRAVENOUS
Qty: 0 | Refills: 0 | Status: DISCONTINUED | OUTPATIENT
Start: 2017-11-29 | End: 2017-11-29

## 2017-11-29 RX ORDER — GABAPENTIN 400 MG/1
100 CAPSULE ORAL ONCE
Qty: 0 | Refills: 0 | Status: DISCONTINUED | OUTPATIENT
Start: 2017-11-29 | End: 2017-11-29

## 2017-11-29 RX ORDER — SODIUM CHLORIDE 9 MG/ML
3 INJECTION INTRAMUSCULAR; INTRAVENOUS; SUBCUTANEOUS EVERY 8 HOURS
Qty: 0 | Refills: 0 | Status: DISCONTINUED | OUTPATIENT
Start: 2017-11-29 | End: 2017-11-29

## 2017-11-29 RX ORDER — ACETAMINOPHEN 500 MG
975 TABLET ORAL ONCE
Qty: 0 | Refills: 0 | Status: DISCONTINUED | OUTPATIENT
Start: 2017-11-29 | End: 2017-11-29

## 2017-11-29 RX ORDER — TRAMADOL HYDROCHLORIDE 50 MG/1
50 TABLET ORAL ONCE
Qty: 0 | Refills: 0 | Status: COMPLETED | OUTPATIENT
Start: 2017-11-29 | End: 2017-11-29

## 2017-11-29 RX ORDER — PANTOPRAZOLE SODIUM 20 MG/1
40 TABLET, DELAYED RELEASE ORAL ONCE
Qty: 0 | Refills: 0 | Status: DISCONTINUED | OUTPATIENT
Start: 2017-11-29 | End: 2017-11-29

## 2017-11-29 RX ADMIN — SODIUM CHLORIDE 2000 MILLILITER(S): 9 INJECTION INTRAMUSCULAR; INTRAVENOUS; SUBCUTANEOUS at 22:13

## 2017-11-29 NOTE — ED PROVIDER NOTE - PROGRESS NOTE DETAILS
rhythm appeared to change   still narrow   but rate consistent 140  repeat EKG  no clear P wave  given cardizem 10  rate decreased to 100-110    /70  po cardizem ordered Klepfish: HR was 130s, maintaining BP, given additional IV cardizem and PO, HR now 100-115, awaiting CTA, will reassess. Paige: Pt getting CT, will reassess. Klepfish: -110, awaiting CTA read. Will repeat CE. Reassess, Klepfish: Current HR ~110. d/w tele DOD, recommending Mg as well. text paged tele pa.

## 2017-11-29 NOTE — PROGRESS NOTE ADULT - SUBJECTIVE AND OBJECTIVE BOX
Pt admitted to preop for L partial patellectomy for patella fracture.  She was seen in preop where HR in 140s and BP 90s/60s.  No c/o CP/SOB/palpitations and comfortable in chair without any pain.  After giving liter of IVF, no change in heart rate, but BP responded to 120s/70s.  Anesthesia administered esmolol with no change in HR.  She was seen yesterday in PST and was normotensive with HR in 90s.  She has no history of atrial fibrillation.  EKG obtained unable to determine if atrial fibrillation or sinus tachycardia due to rate.  Her labs yesterday were normal and up to this point refused labs today.    At this point, after discussion with Dr. Valero of orthopedics and Dr. Pool of anesthesia, decision was made to cancel case until better medically optimized.  Her PCP Dr. Peterson's office was contacted and was recommended to have her admitted to hospitalist service for medical management of her tachycardia.  She will be transferred to ER for medical admission.  From orthopedic perspective, plan to perform L partial patellectomy for patella fracture during this admission once optimized.  Ortho to follow during medical admission.  Plan to proceed at earliest tomorrow after 5pm if medically optimized.

## 2017-11-29 NOTE — ED PROVIDER NOTE - MEDICAL DECISION MAKING DETAILS
asymptomatic tachycardia    SVT  with MAT and atrial tach    check lytes  CBC  trial of fluids  received beta blocker w/o effect and is on metoprolol

## 2017-11-29 NOTE — ASU PREOP CHECKLIST - COMMENTS
sip of water with meds this am atorvostatin amlodipine benzepril sip of water with meds this am atorvostatin amlodipine benzepril, atenolol

## 2017-11-29 NOTE — ASU PREOP CHECKLIST - 3.
pt was taken to the ED ambulance bay handoff to [facility] in ED pt had son juan with all of her belongings with her

## 2017-11-29 NOTE — ED PROVIDER NOTE - OBJECTIVE STATEMENT
88yF hx htn sent to ED from pre-op for fast HR. Pt was in pre-op testing today for patellar fx repair (fractured 11/23). Incidentally found to have HR 40s. Given esmolol and 1L NS without resolution. Pt with  in ED, BP 97/66. Pt denies any symptoms.  Ortho: [facility]  PMD: Pessar  No Cardiologist 88yF hx htn sent to ED from pre-op for fast HR. Pt was in pre-op testing today for patellar fx repair (fractured 11/23). Incidentally found to have HR 140s. Given esmolol and 1L NS without resolution. Pt with  in ED, BP 97/66. Pt denies any symptoms.  Ortho: [facility]  PMD: Pessar  No Cardiologist

## 2017-11-29 NOTE — ED ADULT TRIAGE NOTE - CHIEF COMPLAINT QUOTE
Pt presents from ambulatory surgery where she was scheduled left knee surgery due to tachycardia, 140's. Pt denies chest pain.

## 2017-11-29 NOTE — ED ADULT NURSE NOTE - OBJECTIVE STATEMENT
Pt to spot #3 alert and oriented with family at bedside, sent to ED from pre-op for fast HR. Pt was in pre-op testing today for patellar fx repair (fractured 11/23). Incidentally found to have HR 40s. Given esmolol and 1L NS without resolution. Pt with  in ED, BP 97/66. Pt denies any symptoms. labs drawn and sent vitals as noted, awaiting further orders

## 2017-11-29 NOTE — CONSULT NOTE ADULT - ATTENDING COMMENTS
Patient has a displaced inferior patella pole fracture.  Requires a partial patellectomy once medically stable, cleared.

## 2017-11-29 NOTE — ED PROVIDER NOTE - FAMILY HISTORY
Father  Still living? No  Family history of hypertension, Age at diagnosis: Age Unknown     Uncle  Still living? No  Family history of hypertension, Age at diagnosis: Age Unknown

## 2017-11-29 NOTE — ED PROVIDER NOTE - ATTENDING CONTRIBUTION TO CARE
[place]urto  pt sent from presurgical testing due to tachycardia  Pt w/o sxs of CP  SOB  Abnd pain  no vomiting or diarrhea  pt on beta blocker which she did not stop   Only new med low dose risperidone    Pt given saline  IV beta blocker w/e effect  Pt w/o complaints here  exam  pt in no distress clear lungs  card tachycaric  irregula  systolic murmur  LSB abd nontender  Lt knee in brace  mild distal edema  EKG  tachycardic  some strips c/w MAT   others ?atrial tachycardia  given 2 liters IV  no change in rhythm   IVC evaluated  increased after fluid to normal range    labs  decreased Hgb c/w last week  no complaint to suggest bleed (check guiac)  trauma w/o bleed no involvement of abd or pelvis

## 2017-11-30 DIAGNOSIS — I10 ESSENTIAL (PRIMARY) HYPERTENSION: ICD-10-CM

## 2017-11-30 DIAGNOSIS — Z29.9 ENCOUNTER FOR PROPHYLACTIC MEASURES, UNSPECIFIED: ICD-10-CM

## 2017-11-30 DIAGNOSIS — I48.91 UNSPECIFIED ATRIAL FIBRILLATION: ICD-10-CM

## 2017-11-30 DIAGNOSIS — E78.5 HYPERLIPIDEMIA, UNSPECIFIED: ICD-10-CM

## 2017-11-30 LAB
APTT BLD: 155.1 SEC — CRITICAL HIGH (ref 27.5–37.4)
APTT BLD: 26.9 SEC — LOW (ref 27.5–37.4)
BACTERIA NPH CULT: SIGNIFICANT CHANGE UP
BACTERIA UR CULT: SIGNIFICANT CHANGE UP
BUN SERPL-MCNC: 14 MG/DL — SIGNIFICANT CHANGE UP (ref 7–23)
CALCIUM SERPL-MCNC: 8.5 MG/DL — SIGNIFICANT CHANGE UP (ref 8.4–10.5)
CHLORIDE SERPL-SCNC: 102 MMOL/L — SIGNIFICANT CHANGE UP (ref 98–107)
CHOLEST SERPL-MCNC: 168 MG/DL — SIGNIFICANT CHANGE UP (ref 120–199)
CK MB BLD-MCNC: 2.41 NG/ML — SIGNIFICANT CHANGE UP (ref 1–4.7)
CK MB BLD-MCNC: SIGNIFICANT CHANGE UP (ref 0–2.5)
CK SERPL-CCNC: 95 U/L — SIGNIFICANT CHANGE UP (ref 25–170)
CO2 SERPL-SCNC: 24 MMOL/L — SIGNIFICANT CHANGE UP (ref 22–31)
CREAT SERPL-MCNC: 0.54 MG/DL — SIGNIFICANT CHANGE UP (ref 0.5–1.3)
GLUCOSE SERPL-MCNC: 127 MG/DL — HIGH (ref 70–99)
HBA1C BLD-MCNC: 5.9 % — HIGH (ref 4–5.6)
HCT VFR BLD CALC: 28.8 % — LOW (ref 34.5–45)
HCT VFR BLD CALC: 32.1 % — LOW (ref 34.5–45)
HDLC SERPL-MCNC: 71 MG/DL — HIGH (ref 45–65)
HGB BLD-MCNC: 10.4 G/DL — LOW (ref 11.5–15.5)
HGB BLD-MCNC: 9.1 G/DL — LOW (ref 11.5–15.5)
INR BLD: 1.1 — SIGNIFICANT CHANGE UP (ref 0.88–1.17)
LIPID PNL WITH DIRECT LDL SERPL: 90 MG/DL — SIGNIFICANT CHANGE UP
MAGNESIUM SERPL-MCNC: 2.8 MG/DL — HIGH (ref 1.6–2.6)
MCHC RBC-ENTMCNC: 31.6 % — LOW (ref 32–36)
MCHC RBC-ENTMCNC: 31.9 PG — SIGNIFICANT CHANGE UP (ref 27–34)
MCHC RBC-ENTMCNC: 31.9 PG — SIGNIFICANT CHANGE UP (ref 27–34)
MCHC RBC-ENTMCNC: 32.4 % — SIGNIFICANT CHANGE UP (ref 32–36)
MCV RBC AUTO: 101.1 FL — HIGH (ref 80–100)
MCV RBC AUTO: 98.5 FL — SIGNIFICANT CHANGE UP (ref 80–100)
NRBC # FLD: 0 — SIGNIFICANT CHANGE UP
NRBC # FLD: 0 — SIGNIFICANT CHANGE UP
PLATELET # BLD AUTO: 221 K/UL — SIGNIFICANT CHANGE UP (ref 150–400)
PLATELET # BLD AUTO: 244 K/UL — SIGNIFICANT CHANGE UP (ref 150–400)
PMV BLD: 10 FL — SIGNIFICANT CHANGE UP (ref 7–13)
PMV BLD: 9.7 FL — SIGNIFICANT CHANGE UP (ref 7–13)
POTASSIUM SERPL-MCNC: 4 MMOL/L — SIGNIFICANT CHANGE UP (ref 3.5–5.3)
POTASSIUM SERPL-SCNC: 4 MMOL/L — SIGNIFICANT CHANGE UP (ref 3.5–5.3)
PROTHROM AB SERPL-ACNC: 12.4 SEC — SIGNIFICANT CHANGE UP (ref 9.8–13.1)
RBC # BLD: 2.85 M/UL — LOW (ref 3.8–5.2)
RBC # BLD: 3.26 M/UL — LOW (ref 3.8–5.2)
RBC # FLD: 13.6 % — SIGNIFICANT CHANGE UP (ref 10.3–14.5)
RBC # FLD: 13.7 % — SIGNIFICANT CHANGE UP (ref 10.3–14.5)
RH IG SCN BLD-IMP: POSITIVE — SIGNIFICANT CHANGE UP
SODIUM SERPL-SCNC: 140 MMOL/L — SIGNIFICANT CHANGE UP (ref 135–145)
TRIGL SERPL-MCNC: 51 MG/DL — SIGNIFICANT CHANGE UP (ref 10–149)
TROPONIN T SERPL-MCNC: < 0.06 NG/ML — SIGNIFICANT CHANGE UP (ref 0–0.06)
WBC # BLD: 7.33 K/UL — SIGNIFICANT CHANGE UP (ref 3.8–10.5)
WBC # BLD: 9.6 K/UL — SIGNIFICANT CHANGE UP (ref 3.8–10.5)
WBC # FLD AUTO: 7.33 K/UL — SIGNIFICANT CHANGE UP (ref 3.8–10.5)
WBC # FLD AUTO: 9.6 K/UL — SIGNIFICANT CHANGE UP (ref 3.8–10.5)

## 2017-11-30 PROCEDURE — 71275 CT ANGIOGRAPHY CHEST: CPT | Mod: 26

## 2017-11-30 RX ORDER — LISINOPRIL 2.5 MG/1
10 TABLET ORAL DAILY
Qty: 0 | Refills: 0 | Status: DISCONTINUED | OUTPATIENT
Start: 2017-11-30 | End: 2017-12-03

## 2017-11-30 RX ORDER — SODIUM CHLORIDE 9 MG/ML
250 INJECTION INTRAMUSCULAR; INTRAVENOUS; SUBCUTANEOUS ONCE
Qty: 0 | Refills: 0 | Status: COMPLETED | OUTPATIENT
Start: 2017-11-30 | End: 2017-11-30

## 2017-11-30 RX ORDER — AMLODIPINE BESYLATE 2.5 MG/1
5 TABLET ORAL DAILY
Qty: 0 | Refills: 0 | Status: DISCONTINUED | OUTPATIENT
Start: 2017-11-30 | End: 2017-11-30

## 2017-11-30 RX ORDER — ASPIRIN/CALCIUM CARB/MAGNESIUM 324 MG
81 TABLET ORAL DAILY
Qty: 0 | Refills: 0 | Status: DISCONTINUED | OUTPATIENT
Start: 2017-11-30 | End: 2017-12-06

## 2017-11-30 RX ORDER — RISPERIDONE 4 MG/1
0.5 TABLET ORAL AT BEDTIME
Qty: 0 | Refills: 0 | Status: DISCONTINUED | OUTPATIENT
Start: 2017-11-30 | End: 2017-12-06

## 2017-11-30 RX ORDER — ATORVASTATIN CALCIUM 80 MG/1
10 TABLET, FILM COATED ORAL AT BEDTIME
Qty: 0 | Refills: 0 | Status: DISCONTINUED | OUTPATIENT
Start: 2017-11-30 | End: 2017-12-06

## 2017-11-30 RX ORDER — AMIODARONE HYDROCHLORIDE 400 MG/1
200 TABLET ORAL DAILY
Qty: 0 | Refills: 0 | Status: DISCONTINUED | OUTPATIENT
Start: 2017-12-07 | End: 2017-12-06

## 2017-11-30 RX ORDER — MAGNESIUM SULFATE 500 MG/ML
2 VIAL (ML) INJECTION ONCE
Qty: 0 | Refills: 0 | Status: COMPLETED | OUTPATIENT
Start: 2017-11-30 | End: 2017-11-30

## 2017-11-30 RX ORDER — HEPARIN SODIUM 5000 [USP'U]/ML
4000 INJECTION INTRAVENOUS; SUBCUTANEOUS ONCE
Qty: 0 | Refills: 0 | Status: COMPLETED | OUTPATIENT
Start: 2017-11-30 | End: 2017-11-30

## 2017-11-30 RX ORDER — HEPARIN SODIUM 5000 [USP'U]/ML
4000 INJECTION INTRAVENOUS; SUBCUTANEOUS EVERY 6 HOURS
Qty: 0 | Refills: 0 | Status: DISCONTINUED | OUTPATIENT
Start: 2017-11-30 | End: 2017-11-30

## 2017-11-30 RX ORDER — HEPARIN SODIUM 5000 [USP'U]/ML
800 INJECTION INTRAVENOUS; SUBCUTANEOUS
Qty: 25000 | Refills: 0 | Status: DISCONTINUED | OUTPATIENT
Start: 2017-11-30 | End: 2017-11-30

## 2017-11-30 RX ORDER — HEPARIN SODIUM 5000 [USP'U]/ML
INJECTION INTRAVENOUS; SUBCUTANEOUS
Qty: 25000 | Refills: 0 | Status: DISCONTINUED | OUTPATIENT
Start: 2017-11-30 | End: 2017-11-30

## 2017-11-30 RX ORDER — ACETAMINOPHEN 500 MG
650 TABLET ORAL EVERY 6 HOURS
Qty: 0 | Refills: 0 | Status: DISCONTINUED | OUTPATIENT
Start: 2017-11-30 | End: 2017-12-06

## 2017-11-30 RX ORDER — ATENOLOL 25 MG/1
25 TABLET ORAL DAILY
Qty: 0 | Refills: 0 | Status: DISCONTINUED | OUTPATIENT
Start: 2017-11-30 | End: 2017-11-30

## 2017-11-30 RX ORDER — ATENOLOL 25 MG/1
25 TABLET ORAL
Qty: 0 | Refills: 0 | Status: DISCONTINUED | OUTPATIENT
Start: 2017-11-30 | End: 2017-11-30

## 2017-11-30 RX ORDER — CALCIUM GLUCONATE 100 MG/ML
1 VIAL (ML) INTRAVENOUS ONCE
Qty: 0 | Refills: 0 | Status: COMPLETED | OUTPATIENT
Start: 2017-11-30 | End: 2017-11-30

## 2017-11-30 RX ORDER — AMIODARONE HYDROCHLORIDE 400 MG/1
400 TABLET ORAL THREE TIMES A DAY
Qty: 0 | Refills: 0 | Status: DISCONTINUED | OUTPATIENT
Start: 2017-11-30 | End: 2017-12-06

## 2017-11-30 RX ORDER — HYDROCHLOROTHIAZIDE 25 MG
12.5 TABLET ORAL DAILY
Qty: 0 | Refills: 0 | Status: DISCONTINUED | OUTPATIENT
Start: 2017-11-30 | End: 2017-12-03

## 2017-11-30 RX ORDER — HEPARIN SODIUM 5000 [USP'U]/ML
2000 INJECTION INTRAVENOUS; SUBCUTANEOUS EVERY 6 HOURS
Qty: 0 | Refills: 0 | Status: DISCONTINUED | OUTPATIENT
Start: 2017-11-30 | End: 2017-11-30

## 2017-11-30 RX ADMIN — SODIUM CHLORIDE 1500 MILLILITER(S): 9 INJECTION INTRAMUSCULAR; INTRAVENOUS; SUBCUTANEOUS at 19:15

## 2017-11-30 RX ADMIN — Medication 100 GRAM(S): at 06:33

## 2017-11-30 RX ADMIN — HEPARIN SODIUM 1000 UNIT(S)/HR: 5000 INJECTION INTRAVENOUS; SUBCUTANEOUS at 04:02

## 2017-11-30 RX ADMIN — RISPERIDONE 0.5 MILLIGRAM(S): 4 TABLET ORAL at 21:31

## 2017-11-30 RX ADMIN — Medication 81 MILLIGRAM(S): at 13:47

## 2017-11-30 RX ADMIN — AMIODARONE HYDROCHLORIDE 400 MILLIGRAM(S): 400 TABLET ORAL at 15:49

## 2017-11-30 RX ADMIN — Medication 1 TABLET(S): at 13:47

## 2017-11-30 RX ADMIN — HEPARIN SODIUM 0 UNIT(S)/HR: 5000 INJECTION INTRAVENOUS; SUBCUTANEOUS at 11:27

## 2017-11-30 RX ADMIN — Medication 200 GRAM(S): at 02:12

## 2017-11-30 RX ADMIN — AMIODARONE HYDROCHLORIDE 400 MILLIGRAM(S): 400 TABLET ORAL at 19:14

## 2017-11-30 RX ADMIN — Medication 0.5 MILLIGRAM(S): at 22:25

## 2017-11-30 RX ADMIN — HEPARIN SODIUM 4000 UNIT(S): 5000 INJECTION INTRAVENOUS; SUBCUTANEOUS at 04:02

## 2017-11-30 RX ADMIN — ATORVASTATIN CALCIUM 10 MILLIGRAM(S): 80 TABLET, FILM COATED ORAL at 21:31

## 2017-11-30 RX ADMIN — HEPARIN SODIUM 800 UNIT(S)/HR: 5000 INJECTION INTRAVENOUS; SUBCUTANEOUS at 13:01

## 2017-11-30 NOTE — PROGRESS NOTE ADULT - SUBJECTIVE AND OBJECTIVE BOX
Orthopedic Surgery Progress Note  S: Pain is well controlled.  Patient continues to be tachycardic in 130s, resistant to medications. CTPE negative.    O:  Vital Signs Last 24 Hrs  T(C): 36.8 (30 Nov 2017 03:01), Max: 37.3 (30 Nov 2017 00:18)  T(F): 98.3 (30 Nov 2017 03:01), Max: 99.2 (30 Nov 2017 00:18)  HR: 123 (30 Nov 2017 06:30) (106 - 146)  BP: 109/74 (30 Nov 2017 06:30) (90/64 - 129/77)  RR: 14 (30 Nov 2017 06:30) (14 - 18)  SpO2: 99% (30 Nov 2017 06:30) (94% - 100%)    Gen: NAD  LLE  L knee with large effusion and TTP over patella  EHL/FHL/TA/GS intact  SILT DP/SP/Velez/Sa  WWP distally                       9.5    7.66  )-----------( 224      ( 29 Nov 2017 20:30 )             29.0     11-29    140  |  103  |  19  ----------------------------<  114<H>  4.3   |  25  |  0.57    PT/INR - ( 30 Nov 2017 01:00 )   PT: 12.4 SEC;   INR: 1.10        PTT - ( 30 Nov 2017 01:00 )  PTT:26.9 SEC    A/P 88y year old Female admitted to hospital d/t tachycardia found prior to scheduled L knee partial patellectomy that was cancelled    Pain Control  Tele  Continue workup and care per primary team  will f/u preop clearance    Devon Barragan MD

## 2017-11-30 NOTE — H&P ADULT - NEGATIVE GENERAL GENITOURINARY SYMPTOMS
no dysuria/normal urinary frequency/no gas in urine/no urinary hesitancy/no nocturia/no renal colic/no bladder infections/no hematuria/no flank pain L/no flank pain R/no urine discoloration/no incontinence/normal libido

## 2017-11-30 NOTE — H&P ADULT - NEGATIVE CARDIOVASCULAR SYMPTOMS
no chest pain/no dyspnea on exertion/no peripheral edema/no paroxysmal nocturnal dyspnea/no claudication/no palpitations/no orthopnea

## 2017-11-30 NOTE — PATIENT PROFILE ADULT. - FALL HARM RISK
coagulation(Bleeding disorder R/T clinical cond/anti-coags)/weakness/age(85 years old or older)/other

## 2017-11-30 NOTE — H&P ADULT - GASTROINTESTINAL DETAILS
soft/nontender/no masses palpable/no organomegaly/normal/bowel sounds normal/no bruit/no rebound tenderness/no rigidity/no distention/no guarding

## 2017-11-30 NOTE — H&P ADULT - NEGATIVE OPHTHALMOLOGIC SYMPTOMS
no photophobia/no lacrimation R/no blurred vision L/no blurred vision R/no discharge R/no lacrimation L/no discharge L/no pain L/no irritation L/no loss of vision R/no scleral injection R/no loss of vision L/no scleral injection L/no pain R/no diplopia/no irritation R

## 2017-11-30 NOTE — H&P ADULT - PROBLEM SELECTOR PLAN 1
admit to tele, FLORIN, repeat EKGs  continue Diltiazem infusion, if HR does not come down consider switching to IV atenolol  consider cardioversion if HR does not decrease admit to tele, FLORIN, repeat EKGs  continue Diltiazem infusion, if HR does not come down consider switching to IV atenolol  heparin gtt - monitor ptt  consider cardioversion if HR does not decrease

## 2017-11-30 NOTE — H&P ADULT - CARDIOVASCULAR DETAILS
irregular rate and rhythm/tachycardia/bounding pulses bounding pulses/positive S1/irregular rate and rhythm/tachycardia/positive S2

## 2017-11-30 NOTE — H&P ADULT - NEGATIVE SKIN SYMPTOMS
no change in size/color of mole/no tumor/no pitted nails/no itching/no dryness/no rash/no brittle nails/no hair loss

## 2017-11-30 NOTE — CONSULT NOTE ADULT - SUBJECTIVE AND OBJECTIVE BOX
EP ATTENDING      HISTORY OF PRESENT ILLNESS: She is a pleasant 89 y/o female PMH hyperlipidemia and HTN who was found to be in SVT at preop testing prior to an elective left partial patellectomy for a patella fracture. She is asymptomatic denying palpitations nor syncope. Review of the EKG is consistent with an automatic focal low left atrial tachycardia (positive in V1, negative in II/F/III). There is no evidence of atrial fibrillation nor atrial flutter. Echo is pending, TSH is normal.    PAST MEDICAL & SURGICAL HISTORY:  Cataracts, bilateral  Hyperlipidemia  Hypertension  H/O cataract extraction  S/P breast biopsy  SVT (focal AT)    MEDICATIONS  (STANDING):  aspirin  chewable 81 milliGRAM(s) Oral daily  ATENolol  Tablet 25 milliGRAM(s) Oral two times a day  atorvastatin 10 milliGRAM(s) Oral at bedtime  diltiazem Infusion 10 mG/Hr (10 mL/Hr) IV Continuous <Continuous>  heparin  Infusion.  Unit(s)/Hr (10 mL/Hr) IV Continuous <Continuous>  hydrochlorothiazide 12.5 milliGRAM(s) Oral daily  lisinopril 10 milliGRAM(s) Oral daily  LORazepam     Tablet 0.5 milliGRAM(s) Oral daily  multivitamin 1 Tablet(s) Oral daily  risperiDONE   Tablet 0.5 milliGRAM(s) Oral at bedtime    Allergies  Cipro (Unknown)  latex (Urticaria)  penicillin (Unknown)    FAMILY HISTORY:  Family history of acute myocardial infarction (Uncle, Father)  Family history of hypertension (Uncle)  Non-contributary for premature coronary disease or sudden cardiac death    SOCIAL HISTORY:    [ x] Non-smoker  [ ] Smoker  [ ] Alcohol      REVIEW OF SYSTEMS:  [ ]chest pain  [  ]shortness of breath  [  ]palpitations  [  ]syncope  [ ]near syncope [ ]upper extremity weakness   [ ] lower extremity weakness  [  ]diplopia  [  ]altered mental status   [  ]fevers  [ ]chills [ ]nausea  [ ]vomitting  [  ]dysphagia    [ ]abdominal pain  [ ]melena  [ ]BRBPR    [  ]epistaxis  [  ]rash    [ ]lower extremity edema        [ x] All others negative	  [ ] Unable to obtain    PHYSICAL EXAM:  T(C): 36.7 (11-30-17 @ 08:27), Max: 37.3 (11-30-17 @ 00:18)  HR: 136 (11-30-17 @ 11:33) (106 - 146)  BP: 112/72 (11-30-17 @ 11:33) (90/64 - 129/77)  RR: 16 (11-30-17 @ 11:33) (14 - 18)  SpO2: 100% (11-30-17 @ 11:33) (94% - 100%)  Wt(kg): --    no JVD  tachy, no murmurs  CTAB  soft nt/nd  no c/c/e    TELEMETRY: 	  focal AT  ECG:  	focal AT    Echo: pending  NST: n/a  Cath: n/a  	  	  LABS:	 	                          9.1    7.33  )-----------( 221      ( 30 Nov 2017 11:15 )             28.8     11-30    140  |  102  |  14  ----------------------------<  127<H>  4.0   |  24  |  0.54    Ca    8.5      30 Nov 2017 07:50  Mg     2.8     11-30    TPro  6.3  /  Alb  3.3  /  TBili  1.5<H>  /  DBili  x   /  AST  23  /  ALT  20  /  AlkPhos  53  11-29    proBNP:   Lipid Profile:   HgA1c: Hemoglobin A1C, Whole Blood: 5.9 % (11-30 @ 07:50)    TSH: Thyroid Stimulating Hormone, Serum: 1.09 uIU/mL (11-29 @ 20:30)      A/P) She is a pleasant 89 y/o female PMH hyperlipidemia and HTN who was found to be in SVT at preop testing prior to an elective left partial patellectomy for a patella fracture. She is asymptomatic denying palpitations nor syncope. Review of the EKG is consistent with an automatic focal low left atrial tachycardia (positive in V1, negative in II/F/III). There is no evidence of atrial fibrillation nor atrial flutter. Echo is pending, TSH is normal.    -given no evidence of atrial fibrillation nor atrial flutter (yet) she is unlikely to require long term anticoagulation unless of course telemetry shows new AF  -get echo  -continue heparin for now  -this arrhythmia is not amenable to FLORIN-DCCV (it will just restart)  -d/c atenolol, d/c cardizem  -start amiodarone 400mg po tid with food (7 days) followed by 200mg daily  -if it doesn't respond to AAD therapy then I will discuss the role of ablation      Davey Caban M.D., [facility]  Cardiac Electrophysiology  [phone]

## 2017-11-30 NOTE — CHART NOTE - NSCHARTNOTEFT_GEN_A_CORE
d/w EP.  No evidence of AF/AFL yet and given her anemia and swollen left knee, Heparin gtt DC'd.  --Ortho F/u  --Meds started for rate control of PAT  --Check TTE    Shayy Aquino PA-C  [facility]  [address], [address]   [place] [address]  office [phone]  pager [phone]

## 2017-11-30 NOTE — H&P ADULT - NEGATIVE PSYCHIATRIC SYMPTOMS
no depression/no insomnia/no agitation/no suicidal ideation/no auditory hallucinations/no memory loss/no visual hallucinations/no hyperactivity/no paranoia/no mood swings

## 2017-11-30 NOTE — H&P ADULT - NEGATIVE ENMT SYMPTOMS
no hearing difficulty/no recurrent cold sores/no ear pain/no abnormal taste sensation/no tinnitus/no vertigo/no sinus symptoms/no nasal discharge/no post-nasal discharge/no gum bleeding/no throat pain/no dysphagia/no nasal congestion/no nasal obstruction/no nose bleeds/no dry mouth

## 2017-11-30 NOTE — H&P ADULT - NEGATIVE MUSCULOSKELETAL SYMPTOMS
no myalgia/no stiffness/no neck pain/no arm pain R/no leg pain R/no arm pain L/no leg pain L/no back pain/no arthritis/no muscle cramps/no muscle weakness/no arthralgia

## 2017-11-30 NOTE — H&P ADULT - NSHPPHYSICALEXAM_GEN_ALL_CORE
Vital Signs Last 24 Hrs  T(C): 36.7 (30 Nov 2017 08:27), Max: 37.3 (30 Nov 2017 00:18)  T(F): 98 (30 Nov 2017 08:27), Max: 99.2 (30 Nov 2017 00:18)  HR: 144 (30 Nov 2017 08:27) (106 - 146)  BP: 109/76 (30 Nov 2017 08:27) (90/64 - 129/77)  BP(mean): --  RR: 16 (30 Nov 2017 08:27) (14 - 18)  SpO2: 99% (30 Nov 2017 08:27) (94% - 100%)      EKG: Atrial fibrillation @ 90 BPM

## 2017-11-30 NOTE — H&P ADULT - FAMILY HISTORY
Uncle  Still living? No  Family history of hypertension, Age at diagnosis: Age Unknown  Family history of acute myocardial infarction, Age at diagnosis: Age Unknown     Father  Still living? Unknown  Family history of acute myocardial infarction, Age at diagnosis: Age Unknown

## 2017-11-30 NOTE — PROVIDER CONTACT NOTE (OTHER) - ACTION/TREATMENT ORDERED:
per cardiology rhythm seen as A tach, patient to continue on amio for now, no beta blockers to be given, will continue to monitor.

## 2017-11-30 NOTE — H&P ADULT - NEGATIVE NEUROLOGICAL SYMPTOMS
no transient paralysis/no focal seizures/no loss of sensation/no difficulty walking/no tremors/no weakness/no generalized seizures/no syncope/no headache/no vertigo/no confusion/no loss of consciousness/no hemiparesis/no paresthesias/no facial palsy

## 2017-11-30 NOTE — H&P ADULT - HISTORY OF PRESENT ILLNESS
88 year old female with a PmHx of Hyperlipidemia, HTN, cataracts, delusional disorder presents to [facility] ED with tachycardia (150s) discovered during preop testing for patella fracture repair. Patient reports that on Tuesday 11/23 she fell twice  on her left knee. She was scheduled to have surgery Wednesday 11/29. After Afib was discovered during testing, patient was given Esmolol IV drip in [facility] ED in attempt to lower her heart rate. She is currently on Diltiazem drip. Patient denies ever feeling palpitations, chest pain or shortness of breath. She also denies nausea, vomiting, fever, chills, diaphoresis, headache, dizziness, weakness. 88 year old female with a PmHx of Hyperlipidemia, HTN, cataracts, delusional disorder presents to [facility] ED with tachycardia (150s) discovered during preop testing for patella fracture repair. Patient reports that on Tuesday 11/23 she fell twice  on her left knee. She was scheduled to have surgery Wednesday 11/29. After Afib was discovered during testing, patient was sent to [facility] ER where she was given Esmolol IV drip in [facility] ED in attempt to lower her heart rate. She is currently on Diltiazem drip. Patient denies ever feeling palpitations, chest pain or shortness of breath. She also denies nausea, vomiting, fever, chills, diaphoresis, headache, dizziness, weakness. 87 y/o female, with a PmHx of Hyperlipidemia, HTN, cataracts, delusional disorder, presents to [facility] ED with tachycardia (150s) discovered during preop testing for a left patella fracture repair. Patient reports that on Tuesday 11/23 she fell twice on her left knee after tripping on a ledge on the sidewalk (mechanical fall). She was scheduled to have surgery Wednesday 11/29 but during the pre-op testing she became tachycardic and found to have new onset Afib w/RVR. She was given Esmolol 30mg iv x 3 doses with no relief so she was sent to the ED for further evaluation. While in the ED, she was found to have a sustained HR in the 140's and was given several doses of Diltiazem with minimal relief. Currently, she is on Diltiazem drip and a Heparin gtt. Patient denies ever feeling palpitations, chest pain or shortness of breath. She also denies nausea, vomiting, fever, chills, diaphoresis, headache, dizziness, weakness. Pt is being admitted to telemetry for Afib w/RVR and a plan to have her left Patellar fx repaired when medically cleared.

## 2017-11-30 NOTE — H&P ADULT - MUSCULOSKELETAL
detailed exam decreased strength in left lower extremity, swollen left knee/joint warmth/diminished strength/joint swelling/decreased ROM/decreased ROM due to pain/joint erythema/no calf tenderness details…

## 2017-11-30 NOTE — H&P ADULT - ATTENDING COMMENTS
Patient seen and examined.  Agree with above.   -Admitted with tachycardia, found to have a left atrial tach  -hold hep given no afib and decreasing h/h  -ortho follow up  -check tte  -aad per eps    Taryn Ayala MD  [facility]  [address] [place] [address], [id number]  [place] [address]  office: [phone]  pager: [phone]

## 2017-11-30 NOTE — H&P ADULT - RS GEN PE MLT RESP DETAILS PC
respirations non-labored/airway patent/no subcutaneous emphysema/chest wall tenderness/breath sounds decreased on the left upper  lobe/airway obstructed/no chest wall tenderness/no rhonchi/no wheezes/no intercostal retractions/no rales/normal/good air movement respirations non-labored/airway obstructed/chest wall tenderness/clear to auscultation bilaterally/normal/no rhonchi/no subcutaneous emphysema/no wheezes/breath sounds equal/no intercostal retractions/no rales/airway patent/no chest wall tenderness/good air movement

## 2017-11-30 NOTE — H&P ADULT - NSHPSOCIALHISTORY_GEN_ALL_CORE
Marital Status:     Occupation: retired     Tobacco Use: 50 years ago was a social smoker for several years.     ETOH Use: denies alcohol use     Flu Vaccine:  received the flu vaccine this season                               Pneumonia Vaccine: denies having received the pneumonia vaccine

## 2017-11-30 NOTE — H&P ADULT - NEGATIVE GENERAL SYMPTOMS
no sweating/no anorexia/no weight loss/no polyphagia/no polydipsia/no chills/no fever/no weight gain/no polyuria/no malaise/no fatigue

## 2017-11-30 NOTE — H&P ADULT - NEGATIVE GASTROINTESTINAL SYMPTOMS
no flatulence/no diarrhea/no hiccoughs/no vomiting/no constipation/no change in bowel habits/no melena/no jaundice/no nausea/no abdominal pain/no hematochezia/no steatorrhea

## 2017-11-30 NOTE — H&P ADULT - NEUROLOGICAL DETAILS
deep reflexes intact/no spontaneous movement/superficial reflexes intact/responds to verbal commands/responds to pain/sensation intact/cranial nerves intact/normal strength

## 2017-11-30 NOTE — H&P ADULT - ASSESSMENT
88 year old female with a PmHx of Hyperlipidemia, HTN, cataracts, delusional disorder with atrial fibrillation and tachycardia 87 y/o female, with a PmHx of Hyperlipidemia, HTN, cataracts, delusional disorder, presents to [facility] ED with tachycardia (150s) discovered during preop testing for a left patella fracture repair. Pt is being admitted to telemetry for new afib and left patellar fracture repair.

## 2017-12-01 DIAGNOSIS — I10 ESSENTIAL (PRIMARY) HYPERTENSION: ICD-10-CM

## 2017-12-01 DIAGNOSIS — D64.9 ANEMIA, UNSPECIFIED: ICD-10-CM

## 2017-12-01 DIAGNOSIS — I48.91 UNSPECIFIED ATRIAL FIBRILLATION: ICD-10-CM

## 2017-12-01 LAB
APTT BLD: 27 SEC — LOW (ref 27.5–37.4)
BUN SERPL-MCNC: 15 MG/DL — SIGNIFICANT CHANGE UP (ref 7–23)
CALCIUM SERPL-MCNC: 8.5 MG/DL — SIGNIFICANT CHANGE UP (ref 8.4–10.5)
CHLORIDE SERPL-SCNC: 106 MMOL/L — SIGNIFICANT CHANGE UP (ref 98–107)
CO2 SERPL-SCNC: 29 MMOL/L — SIGNIFICANT CHANGE UP (ref 22–31)
CREAT SERPL-MCNC: 0.52 MG/DL — SIGNIFICANT CHANGE UP (ref 0.5–1.3)
GLUCOSE SERPL-MCNC: 108 MG/DL — HIGH (ref 70–99)
HCT VFR BLD CALC: 30.1 % — LOW (ref 34.5–45)
HGB BLD-MCNC: 9.4 G/DL — LOW (ref 11.5–15.5)
INR BLD: 1.07 — SIGNIFICANT CHANGE UP (ref 0.88–1.17)
MCHC RBC-ENTMCNC: 31.2 % — LOW (ref 32–36)
MCHC RBC-ENTMCNC: 31.5 PG — SIGNIFICANT CHANGE UP (ref 27–34)
MCV RBC AUTO: 101 FL — HIGH (ref 80–100)
NRBC # FLD: 0 — SIGNIFICANT CHANGE UP
PLATELET # BLD AUTO: 224 K/UL — SIGNIFICANT CHANGE UP (ref 150–400)
PMV BLD: 9.5 FL — SIGNIFICANT CHANGE UP (ref 7–13)
POTASSIUM SERPL-MCNC: 3.6 MMOL/L — SIGNIFICANT CHANGE UP (ref 3.5–5.3)
POTASSIUM SERPL-SCNC: 3.6 MMOL/L — SIGNIFICANT CHANGE UP (ref 3.5–5.3)
PROTHROM AB SERPL-ACNC: 12 SEC — SIGNIFICANT CHANGE UP (ref 9.8–13.1)
RBC # BLD: 2.98 M/UL — LOW (ref 3.8–5.2)
RBC # FLD: 13.7 % — SIGNIFICANT CHANGE UP (ref 10.3–14.5)
SODIUM SERPL-SCNC: 142 MMOL/L — SIGNIFICANT CHANGE UP (ref 135–145)
WBC # BLD: 7.56 K/UL — SIGNIFICANT CHANGE UP (ref 3.8–10.5)
WBC # FLD AUTO: 7.56 K/UL — SIGNIFICANT CHANGE UP (ref 3.8–10.5)

## 2017-12-01 PROCEDURE — 93010 ELECTROCARDIOGRAM REPORT: CPT

## 2017-12-01 PROCEDURE — 93306 TTE W/DOPPLER COMPLETE: CPT | Mod: 26

## 2017-12-01 RX ORDER — HALOPERIDOL DECANOATE 100 MG/ML
1 INJECTION INTRAMUSCULAR ONCE
Qty: 0 | Refills: 0 | Status: COMPLETED | OUTPATIENT
Start: 2017-12-01 | End: 2017-12-01

## 2017-12-01 RX ADMIN — Medication 1 TABLET(S): at 13:26

## 2017-12-01 RX ADMIN — HALOPERIDOL DECANOATE 1 MILLIGRAM(S): 100 INJECTION INTRAMUSCULAR at 06:06

## 2017-12-01 RX ADMIN — LISINOPRIL 10 MILLIGRAM(S): 2.5 TABLET ORAL at 07:03

## 2017-12-01 RX ADMIN — AMIODARONE HYDROCHLORIDE 400 MILLIGRAM(S): 400 TABLET ORAL at 07:03

## 2017-12-01 RX ADMIN — Medication 81 MILLIGRAM(S): at 13:26

## 2017-12-01 RX ADMIN — AMIODARONE HYDROCHLORIDE 400 MILLIGRAM(S): 400 TABLET ORAL at 13:25

## 2017-12-01 RX ADMIN — Medication 0.5 MILLIGRAM(S): at 21:18

## 2017-12-01 RX ADMIN — RISPERIDONE 0.5 MILLIGRAM(S): 4 TABLET ORAL at 21:17

## 2017-12-01 RX ADMIN — ATORVASTATIN CALCIUM 10 MILLIGRAM(S): 80 TABLET, FILM COATED ORAL at 21:17

## 2017-12-01 RX ADMIN — AMIODARONE HYDROCHLORIDE 400 MILLIGRAM(S): 400 TABLET ORAL at 21:17

## 2017-12-01 RX ADMIN — Medication 12.5 MILLIGRAM(S): at 07:03

## 2017-12-01 NOTE — CONSULT NOTE ADULT - SUBJECTIVE AND OBJECTIVE BOX
Patient is a 88y old  Female who presents with a chief complaint of fast heart rate (30 Nov 2017 09:34)    HPI:    89 y/o female, with a PmHx of Hyperlipidemia, HTN, cataracts, delusional disorder, presents to [facility] ED with tachycardia (150s) discovered during preop testing for a left patella fracture repair. Patient reports that on Tuesday 11/23 she had a mechanical fall twice on her left knee after tripping on a ledge on the sidewalk. She was scheduled to have surgery Wednesday 11/29 but during the pre-op testing she became tachycardic and found to have new onset Afib w/RVR. She was given Esmolol 30 mg IV x 3 doses with no relief so she was sent to the ED for further evaluation. While in the ED, she was found to have a sustained HR in the 140's and was given several doses of Diltiazem with minimal relief. Currently, she is on diltiazem drip and a heparin gtt. Patient denies ever feeling palpitations, chest pain or shortness of breath. She also denies nausea, vomiting, fever, chills, diaphoresis, headache, dizziness, weakness.      PAST MEDICAL & SURGICAL HISTORY:    Cataracts, bilateral  Delusional disorder  Hyperlipidemia  Hypertension  H/O cataract extraction  S/P breast biopsy      Review of Systems:     CONSTITUTIONAL: No fever, weight loss, or fatigue  EYES: No eye pain, visual disturbances, or discharge  ENMT:  No difficulty hearing, tinnitus, vertigo; No sinus or throat pain  NECK: No pain or stiffness  RESPIRATORY: No cough, wheezing, chills or hemoptysis; No shortness of breath  CARDIOVASCULAR: No chest pain, palpitations, dizziness, or leg swelling  GASTROINTESTINAL: No abdominal or epigastric pain. No nausea, vomiting, or hematemesis; No diarrhea or constipation. No melena or hematochezia.  GENITOURINARY: No dysuria, frequency, hematuria, or incontinence  NEUROLOGICAL: No headaches, memory loss, loss of strength, numbness, or tremors  SKIN: No itching, burning, rashes, or lesions   LYMPH NODES: No enlarged glands  ENDOCRINE: No heat or cold intolerance; No hair loss  MUSCULOSKELETAL: No joint pain or swelling; No muscle, back, or extremity pain  PSYCHIATRIC: No depression, anxiety, mood swings, or difficulty sleeping  HEME/LYMPH: No easy bruising, or bleeding gums  ALLERGY AND IMMUNOLOGIC: No hives or eczema    Allergies    Cipro (Unknown)  latex (Urticaria)  penicillin (Unknown)    Social History: lives alone  retd   remote h/o smoking  no ETOH    FAMILY HISTORY:  Family history of acute myocardial infarction (Uncle, Father)  Family history of hypertension (Uncle)      MEDICATIONS  (STANDING):  amiodarone    Tablet 400 milliGRAM(s) Oral three times a day  aspirin  chewable 81 milliGRAM(s) Oral daily  atorvastatin 10 milliGRAM(s) Oral at bedtime  hydrochlorothiazide 12.5 milliGRAM(s) Oral daily  lisinopril 10 milliGRAM(s) Oral daily  LORazepam     Tablet 0.5 milliGRAM(s) Oral at bedtime  multivitamin 1 Tablet(s) Oral daily  risperiDONE   Tablet 0.5 milliGRAM(s) Oral at bedtime    MEDICATIONS  (PRN):  acetaminophen   Tablet. 650 milliGRAM(s) Oral every 6 hours PRN mild, moderate pain      CAPILLARY BLOOD GLUCOSE        I&O's Summary    30 Nov 2017 07:01  -  01 Dec 2017 07:00  --------------------------------------------------------  IN: 132 mL / OUT: 0 mL / NET: 132 mL        PHYSICAL EXAM:  GENERAL: NAD, asthenic and cachectic   HEAD:  Atraumatic, Normocephalic  EYES: EOMI, PERRLA, conjunctiva and sclera clear  NECK: Supple, No JVD  CHEST/LUNG: Clear to auscultation bilaterally; No wheeze  HEART: S1S2 irregular soft ejection systolic murmur best heard at left sternal border   ABDOMEN: Soft, Nontender, Nondistended; Bowel sounds present  EXTREMITIES:  Peripheral Pulses, No clubbing or cyanosis, no edema  PSYCH: AO x 3,   NEUROLOGY: Alert, no focal motor or sensory deficits  SKIN: No rashes or lesions    LABS:                        9.4    7.56  )-----------( 224      ( 01 Dec 2017 09:11 )             30.1     12-01    142  |  106  |  15  ----------------------------<  108<H>  3.6   |  29  |  0.52    Ca    8.5      01 Dec 2017 09:11  Mg     2.8     11-30    TPro  6.3  /  Alb  3.3  /  TBili  1.5<H>  /  DBili  x   /  AST  23  /  ALT  20  /  AlkPhos  53  11-29    PT/INR - ( 01 Dec 2017 09:11 )   PT: 12.0 SEC;   INR: 1.07          PTT - ( 01 Dec 2017 09:11 )  PTT:27.0 SEC  CARDIAC MARKERS ( 30 Nov 2017 04:20 )  x     / < 0.06 ng/mL / 95 u/L / 2.41 ng/mL / x      CARDIAC MARKERS ( 29 Nov 2017 20:30 )  x     / < 0.06 ng/mL / x     / x     / x              RADIOLOGY & ADDITIONAL TESTS:    Consultant(s) Notes Reviewed:      Care Discussed with Consultants/Other Providers:

## 2017-12-01 NOTE — PROVIDER CONTACT NOTE (OTHER) - ACTION/TREATMENT ORDERED:
patient to receive amio PO as ordered, patient asymptomatic at this time, amio to be given at scheduled time

## 2017-12-01 NOTE — PROGRESS NOTE ADULT - SUBJECTIVE AND OBJECTIVE BOX
EP ATTENDING    tele: atrial tachycardia 120s    no palpitations, no syncope, no angina    acetaminophen   Tablet. 650 milliGRAM(s) Oral every 6 hours PRN  amiodarone    Tablet 400 milliGRAM(s) Oral three times a day  aspirin  chewable 81 milliGRAM(s) Oral daily  atorvastatin 10 milliGRAM(s) Oral at bedtime  hydrochlorothiazide 12.5 milliGRAM(s) Oral daily  lisinopril 10 milliGRAM(s) Oral daily  LORazepam     Tablet 0.5 milliGRAM(s) Oral at bedtime  multivitamin 1 Tablet(s) Oral daily  risperiDONE   Tablet 0.5 milliGRAM(s) Oral at bedtime                            9.1    7.33  )-----------( 221      ( 30 Nov 2017 11:15 )             28.8       11-30    140  |  102  |  14  ----------------------------<  127<H>  4.0   |  24  |  0.54    Ca    8.5      30 Nov 2017 07:50  Mg     2.8     11-30    TPro  6.3  /  Alb  3.3  /  TBili  1.5<H>  /  DBili  x   /  AST  23  /  ALT  20  /  AlkPhos  53  11-29      CARDIAC MARKERS ( 30 Nov 2017 04:20 )  x     / < 0.06 ng/mL / 95 u/L / 2.41 ng/mL / x      CARDIAC MARKERS ( 29 Nov 2017 20:30 )  x     / < 0.06 ng/mL / x     / x     / x            T(C): 36.4 (12-01-17 @ 06:21), Max: 37.4 (12-01-17 @ 01:03)  HR: 125 (12-01-17 @ 06:21) (120 - 150)  BP: 113/72 (12-01-17 @ 06:21) (87/62 - 119/78)  RR: 18 (12-01-17 @ 06:21) (15 - 18)  SpO2: 99% (12-01-17 @ 06:21) (98% - 100%)  Wt(kg): --    no JVD  tachy, no murmurs  CTAB  soft nt/nd  no c/c/e    echo pending      A/P) She is a pleasant 89 y/o female PMH hyperlipidemia and HTN who was found to be in SVT at preop testing prior to an elective left partial patellectomy for a patella fracture. She is asymptomatic denying palpitations nor syncope. Review of the EKG is consistent with an automatic focal low left atrial tachycardia (positive in V1, negative in II/F/III). There is no evidence of atrial fibrillation nor atrial flutter. Echo is pending, TSH is normal.    -given no evidence of atrial fibrillation nor atrial flutter (yet) she is unlikely to require long term anticoagulation unless of course telemetry shows new AF  -get echo  -this arrhythmia is not amenable to FLORIN-DCCV (it will just restart)  -continue amiodarone 400mg po tid with food (day 2/7) followed by 200mg daily  -if it doesn't respond to AAD therapy then I will discuss the role of ablation      Davey Caban M.D., [facility]  Cardiac Electrophysiology  [phone]

## 2017-12-01 NOTE — CONSULT NOTE ADULT - ASSESSMENT
89 y/o female, with a PmHx of Hyperlipidemia, HTN, cataracts, delusional disorder, presents to [facility] ED with tachycardia (150s) discovered during preop testing for a left patella fracture repair, being optimized currently by cardiology attending

## 2017-12-01 NOTE — CONSULT NOTE ADULT - PROBLEM SELECTOR RECOMMENDATION 2
preop evaluation by cardiology attending in progress   await echocardiogram  will defer to cardiology attending

## 2017-12-01 NOTE — PROGRESS NOTE ADULT - SUBJECTIVE AND OBJECTIVE BOX
Orthopedic Surgery Progress Note  S: Pain is well controlled.  No nausea, vomiting, chest pain, shortness of breath, lightheadedness, or dizziness. Patient continues to have atrial tachycardia on tele in 120s    O:  Vital Signs Last 24 Hrs  T(C): 36.4 (01 Dec 2017 06:21), Max: 37.4 (01 Dec 2017 01:03)  T(F): 97.5 (01 Dec 2017 06:21), Max: 99.3 (01 Dec 2017 01:03)  HR: 125 (01 Dec 2017 06:21) (120 - 150)  BP: 113/72 (01 Dec 2017 06:21) (87/62 - 119/78)  RR: 18 (01 Dec 2017 06:21) (15 - 18)  SpO2: 99% (01 Dec 2017 06:21) (98% - 100%)    Gen: NAD  Cards: , irregularly irregular  LLE  L knee with large effusion and TTP over patella  EHL/FHL/TA/GS intact  SILT DP/SP/Velze/Sa  WWP distally                          9.1    7.33  )-----------( 221      ( 30 Nov 2017 11:15 )             28.8                         10.4   9.60  )-----------( 244      ( 30 Nov 2017 07:50 )             32.1     11-30    140  |  102  |  14  ----------------------------<  127<H>  4.0   |  24  |  0.54    PT/INR - ( 30 Nov 2017 01:00 )   PT: 12.4 SEC;   INR: 1.10        PTT - ( 30 Nov 2017 10:52 )  PTT:155.1 SEC    A/P 88y year old Female w/ atrial tachycardia and L patella fracture    Pain Control  Planning for OR tomorrow  f/u EP/cards recs regarding clearance for surgery  Heparin gtt, will need to be held tonight if patient is going to surgery tomorrow  Acute care per primary team  Dispo Planning    Devon Barragan MD

## 2017-12-01 NOTE — PROGRESS NOTE ADULT - SUBJECTIVE AND OBJECTIVE BOX
Pt denies CP, SOB ROS (-)      MEDICATIONS:  MEDICATIONS  (STANDING):  amiodarone    Tablet 400 milliGRAM(s) Oral three times a day  aspirin  chewable 81 milliGRAM(s) Oral daily  atorvastatin 10 milliGRAM(s) Oral at bedtime  hydrochlorothiazide 12.5 milliGRAM(s) Oral daily  lisinopril 10 milliGRAM(s) Oral daily  LORazepam     Tablet 0.5 milliGRAM(s) Oral at bedtime  multivitamin 1 Tablet(s) Oral daily  risperiDONE   Tablet 0.5 milliGRAM(s) Oral at bedtime      LABS:	 	    CARDIAC MARKERS:  CARDIAC MARKERS ( 30 Nov 2017 04:20 )  x     / < 0.06 ng/mL / 95 u/L / 2.41 ng/mL / x      CARDIAC MARKERS ( 29 Nov 2017 20:30 )  x     / < 0.06 ng/mL / x     / x     / x                                    9.4    7.56  )-----------( 224      ( 01 Dec 2017 09:11 )             30.1     Hemoglobin: 9.4 g/dL (12-01 @ 09:11)  Hemoglobin: 9.1 g/dL (11-30 @ 11:15)  Hemoglobin: 10.4 g/dL (11-30 @ 07:50)  Hemoglobin: 9.5 g/dL (11-29 @ 20:30)      12-01    142  |  106  |  15  ----------------------------<  108<H>  3.6   |  29  |  0.52    Ca    8.5      01 Dec 2017 09:11  Mg     2.8     11-30    TPro  6.3  /  Alb  3.3  /  TBili  1.5<H>  /  DBili  x   /  AST  23  /  ALT  20  /  AlkPhos  53  11-29    Creatinine Trend: 0.52<--, 0.54<--, 0.57<--, 0.52<--    COAGS:   PT/INR - ( 01 Dec 2017 09:11 )   PT: 12.0 SEC;   INR: 1.07          PTT - ( 01 Dec 2017 09:11 )  PTT:27.0 SEC    proBNP:   Lipid Profile:   HgA1c:   TSH:       PHYSICAL EXAM:  T(C): 36.9 (12-01-17 @ 13:23), Max: 37.4 (12-01-17 @ 01:03)  HR: 125 (12-01-17 @ 13:23) (123 - 150)  BP: 107/69 (12-01-17 @ 13:23) (87/62 - 119/78)  RR: 18 (12-01-17 @ 13:23) (16 - 18)  SpO2: 100% (12-01-17 @ 13:23) (98% - 100%)  Wt(kg): --  I&O's Summary    30 Nov 2017 07:01  -  01 Dec 2017 07:00  --------------------------------------------------------  IN: 132 mL / OUT: 0 mL / NET: 132 mL          HEENT:   Normal oral mucosa, PERRL, EOMI	  Lymphatic: No obvious lymphadenopathy , no edema  Cardiovascular: Normal S1 S2, No JVD, 1/6 RUPERT murmur, Peripheral pulses palpable 2+ bilaterally  Respiratory: Lungs clear to auscultation, normal effort 	  Gastrointestinal:  Soft, Non-tender, + BS	  Skin: No rashes,  No cyanosis, warm to touch  Musculoskeletal: Normal range of motion, normal strength  Psychiatry:  Appropriate Mood & affect     TELEMETRY: 	  PAT        ASSESSMENT/PLAN: 	88y Female hyperlipidemia and HTN who was found to be in SVT at preop testing prior to an elective left partial patellectomy for a patella fracture.     - Cont Amio Load per EP  - F/u 2 D echo  - Anticipate she will revert back to NSR with amio load  - for patellar surgery when optimized    Alexis Francois MD, FACC  [facility], [facility]  [address] [place] [address]  PHONE:  [phone]  BEEPER : [phone]

## 2017-12-02 LAB
FERRITIN SERPL-MCNC: 203 NG/ML — HIGH (ref 15–150)
HCT VFR BLD CALC: 30 % — LOW (ref 34.5–45)
HGB BLD-MCNC: 9.6 G/DL — LOW (ref 11.5–15.5)
IRON SATN MFR SERPL: 270 UG/DL — SIGNIFICANT CHANGE UP (ref 140–530)
IRON SATN MFR SERPL: 62 UG/DL — SIGNIFICANT CHANGE UP (ref 30–160)
MCHC RBC-ENTMCNC: 31.7 PG — SIGNIFICANT CHANGE UP (ref 27–34)
MCHC RBC-ENTMCNC: 32 % — SIGNIFICANT CHANGE UP (ref 32–36)
MCV RBC AUTO: 99 FL — SIGNIFICANT CHANGE UP (ref 80–100)
NRBC # FLD: 0 — SIGNIFICANT CHANGE UP
PLATELET # BLD AUTO: 221 K/UL — SIGNIFICANT CHANGE UP (ref 150–400)
PMV BLD: 9.5 FL — SIGNIFICANT CHANGE UP (ref 7–13)
RBC # BLD: 3.03 M/UL — LOW (ref 3.8–5.2)
RBC # FLD: 13.7 % — SIGNIFICANT CHANGE UP (ref 10.3–14.5)
UIBC SERPL-MCNC: 208 UG/DL — SIGNIFICANT CHANGE UP (ref 110–370)
VIT B12 SERPL-MCNC: 783 PG/ML — SIGNIFICANT CHANGE UP (ref 200–900)
WBC # BLD: 6.72 K/UL — SIGNIFICANT CHANGE UP (ref 3.8–10.5)
WBC # FLD AUTO: 6.72 K/UL — SIGNIFICANT CHANGE UP (ref 3.8–10.5)

## 2017-12-02 PROCEDURE — 93010 ELECTROCARDIOGRAM REPORT: CPT

## 2017-12-02 RX ORDER — LANOLIN ALCOHOL/MO/W.PET/CERES
3 CREAM (GRAM) TOPICAL AT BEDTIME
Qty: 0 | Refills: 0 | Status: DISCONTINUED | OUTPATIENT
Start: 2017-12-02 | End: 2017-12-06

## 2017-12-02 RX ADMIN — Medication 1 TABLET(S): at 12:16

## 2017-12-02 RX ADMIN — ATORVASTATIN CALCIUM 10 MILLIGRAM(S): 80 TABLET, FILM COATED ORAL at 22:14

## 2017-12-02 RX ADMIN — Medication 81 MILLIGRAM(S): at 12:16

## 2017-12-02 RX ADMIN — AMIODARONE HYDROCHLORIDE 400 MILLIGRAM(S): 400 TABLET ORAL at 13:53

## 2017-12-02 RX ADMIN — Medication 0.5 MILLIGRAM(S): at 22:15

## 2017-12-02 RX ADMIN — Medication 3 MILLIGRAM(S): at 23:58

## 2017-12-02 RX ADMIN — AMIODARONE HYDROCHLORIDE 400 MILLIGRAM(S): 400 TABLET ORAL at 22:14

## 2017-12-02 RX ADMIN — Medication 12.5 MILLIGRAM(S): at 05:02

## 2017-12-02 RX ADMIN — LISINOPRIL 10 MILLIGRAM(S): 2.5 TABLET ORAL at 05:02

## 2017-12-02 RX ADMIN — RISPERIDONE 0.5 MILLIGRAM(S): 4 TABLET ORAL at 22:15

## 2017-12-02 RX ADMIN — AMIODARONE HYDROCHLORIDE 400 MILLIGRAM(S): 400 TABLET ORAL at 05:02

## 2017-12-02 NOTE — PROGRESS NOTE ADULT - SUBJECTIVE AND OBJECTIVE BOX
tele: atrial tachycardia 112-120s    no palpitations, no syncope, no angina    MEDICATIONS  (STANDING):  amiodarone    Tablet 400 milliGRAM(s) Oral three times a day  aspirin  chewable 81 milliGRAM(s) Oral daily  atorvastatin 10 milliGRAM(s) Oral at bedtime  hydrochlorothiazide 12.5 milliGRAM(s) Oral daily  lisinopril 10 milliGRAM(s) Oral daily  LORazepam     Tablet 0.5 milliGRAM(s) Oral at bedtime  multivitamin 1 Tablet(s) Oral daily  risperiDONE   Tablet 0.5 milliGRAM(s) Oral at bedtime    MEDICATIONS  (PRN):  acetaminophen   Tablet. 650 milliGRAM(s) Oral every 6 hours PRN mild, moderate pain      LABS:                        9.6    6.72  )-----------( 221      ( 02 Dec 2017 06:50 )             30.0     Hemoglobin: 9.6 g/dL (12-02 @ 06:50)  Hemoglobin: 9.4 g/dL (12-01 @ 09:11)  Hemoglobin: 9.1 g/dL (11-30 @ 11:15)  Hemoglobin: 10.4 g/dL (11-30 @ 07:50)  Hemoglobin: 9.5 g/dL (11-29 @ 20:30)    12-01    142  |  106  |  15  ----------------------------<  108<H>  3.6   |  29  |  0.52    Ca    8.5      01 Dec 2017 09:11      Creatinine Trend: 0.52<--, 0.54<--, 0.57<--, 0.52<--     CARDIAC MARKERS ( 30 Nov 2017 04:20 )  x     / < 0.06 ng/mL / 95 u/L / 2.41 ng/mL / x      CARDIAC MARKERS ( 29 Nov 2017 20:30 )  x     / < 0.06 ng/mL / x     / x     / x        PHYSICAL EXAM  Vital Signs Last 24 Hrs  T(C): 36.5 (02 Dec 2017 13:03), Max: 37.1 (01 Dec 2017 21:15)  T(F): 97.7 (02 Dec 2017 13:03), Max: 98.7 (01 Dec 2017 21:15)  HR: 112 (02 Dec 2017 13:03) (112 - 121)  BP: 121/73 (02 Dec 2017 13:03) (112/69 - 129/79)  BP(mean): --  RR: 17 (02 Dec 2017 13:03) (17 - 18)  SpO2: 99% (02 Dec 2017 13:03) (96% - 99%)      no JVD  tachy, no murmurs  CTAB  soft nt/nd  no c/c/e    echo   EF 71%  < from: Transthoracic Echocardiogram (12.01.17 @ 15:09) >  1. Mitral annular calcification, otherwise normal mitral  valve. Mild mitral regurgitation.  2. Calcified trileaflet aortic valve with normal opening.  3. Normal left ventricular systolic function. No segmental  wall motion abnormalities.  4. Normal right ventricular size and function.  5. Normal tricuspid valve. Mild tricuspid regurgitation.    < end of copied text >          A/P) She is a pleasant 89 y/o female PMH hyperlipidemia and HTN who was found to be in SVT at preop testing prior to an elective left partial patellectomy for a patella fracture. She is asymptomatic denying palpitations nor syncope. Review of the EKG is consistent with an automatic focal low left atrial tachycardia (positive in V1, negative in II/F/III). There is no evidence of atrial fibrillation nor atrial flutter. Echo is pending, TSH is normal.    -given no evidence of atrial fibrillation nor atrial flutter (yet) she is unlikely to require long term anticoagulation unless of course telemetry shows new AF  -this arrhythmia is not amenable to FLORIN-DCCV (it will just restart)  -continue amiodarone 400mg po tid with food (day 3/7) followed by 200mg daily  -if it doesn't respond to AAD therapy then  will discuss the role of ablation  - echo w/ NL LVS fx

## 2017-12-02 NOTE — PROGRESS NOTE ADULT - SUBJECTIVE AND OBJECTIVE BOX
Patient is a 88y old  Female who presents with a chief complaint of fast heart rate (30 Nov 2017 09:34)      SUBJECTIVE / OVERNIGHT EVENTS: No nausea, vomiting or diarrhea, no fever or chills, no dizziness or chest pain, no dysuria or hematuria, no joint pain or swelling    MEDICATIONS  (STANDING):  amiodarone    Tablet 400 milliGRAM(s) Oral three times a day  aspirin  chewable 81 milliGRAM(s) Oral daily  atorvastatin 10 milliGRAM(s) Oral at bedtime  hydrochlorothiazide 12.5 milliGRAM(s) Oral daily  lisinopril 10 milliGRAM(s) Oral daily  LORazepam     Tablet 0.5 milliGRAM(s) Oral at bedtime  multivitamin 1 Tablet(s) Oral daily  risperiDONE   Tablet 0.5 milliGRAM(s) Oral at bedtime    MEDICATIONS  (PRN):  acetaminophen   Tablet. 650 milliGRAM(s) Oral every 6 hours PRN mild, moderate pain        CAPILLARY BLOOD GLUCOSE        I&O's Summary    PHYSICAL EXAM:  GENERAL: NAD, asthenic and cachectic   HEAD:  Atraumatic, Normocephalic  EYES: EOMI, PERRLA, conjunctiva and sclera clear  NECK: Supple, No JVD  CHEST/LUNG: Clear to auscultation bilaterally; No wheeze  HEART: S1S2 irregular soft ejection systolic murmur best heard at left sternal border   ABDOMEN: Soft, Nontender, Nondistended; Bowel sounds present  EXTREMITIES:  Peripheral Pulses, No clubbing or cyanosis, no edema  PSYCH: AO x 3,   NEUROLOGY: Alert, no focal motor or sensory deficits  SKIN: No rashes or lesions    LABS:                        9.6    6.72  )-----------( 221      ( 02 Dec 2017 06:50 )             30.0     12-01    142  |  106  |  15  ----------------------------<  108<H>  3.6   |  29  |  0.52    Ca    8.5      01 Dec 2017 09:11      PT/INR - ( 01 Dec 2017 09:11 )   PT: 12.0 SEC;   INR: 1.07          PTT - ( 01 Dec 2017 09:11 )  PTT:27.0 SEC            Consultant(s) Notes Reviewed:      Care Discussed with Consultants/Other Providers:    Contact Number, Dr Gottlieb [phone]

## 2017-12-02 NOTE — PROGRESS NOTE ADULT - SUBJECTIVE AND OBJECTIVE BOX
No acute events overnight. Pain well controlled with pain medications. Heart rate in the 120s overnight.    Vital Signs Last 24 Hrs  T(C): 37.1 (01 Dec 2017 21:15), Max: 37.1 (01 Dec 2017 21:15)  T(F): 98.7 (01 Dec 2017 21:15), Max: 98.7 (01 Dec 2017 21:15)  HR: 121 (01 Dec 2017 21:15) (121 - 125)  BP: 112/69 (01 Dec 2017 21:15) (107/69 - 113/72)  BP(mean): --  RR: 18 (01 Dec 2017 21:15) (18 - 18)  SpO2: 97% (01 Dec 2017 21:15) (97% - 100%)    Exam:  Gen: NAD  Motor: EHL/FHL/TA/Gastrocnemius grossly intact  Sensory: SILT DP/SP/S/S/T nerve distributions    A/P: 88 year old male with L patella fracture for OR when cleared  - Pain Control  - Med/Cards Clearance  - Appreciate Med/Cards management  - OR when stable

## 2017-12-02 NOTE — PROGRESS NOTE ADULT - SUBJECTIVE AND OBJECTIVE BOX
Pt denies CP, SOB ROS (-)      acetaminophen   Tablet. 650 milliGRAM(s) Oral every 6 hours PRN  amiodarone    Tablet 400 milliGRAM(s) Oral three times a day  aspirin  chewable 81 milliGRAM(s) Oral daily  atorvastatin 10 milliGRAM(s) Oral at bedtime  hydrochlorothiazide 12.5 milliGRAM(s) Oral daily  lisinopril 10 milliGRAM(s) Oral daily  LORazepam     Tablet 0.5 milliGRAM(s) Oral at bedtime  multivitamin 1 Tablet(s) Oral daily  risperiDONE   Tablet 0.5 milliGRAM(s) Oral at bedtime                            9.6    6.72  )-----------( 221      ( 02 Dec 2017 06:50 )             30.0       Hemoglobin: 9.6 g/dL (12-02 @ 06:50)  Hemoglobin: 9.4 g/dL (12-01 @ 09:11)  Hemoglobin: 9.1 g/dL (11-30 @ 11:15)  Hemoglobin: 10.4 g/dL (11-30 @ 07:50)  Hemoglobin: 9.5 g/dL (11-29 @ 20:30)      12-01    142  |  106  |  15  ----------------------------<  108<H>  3.6   |  29  |  0.52    Ca    8.5      01 Dec 2017 09:11      Creatinine Trend: 0.52<--, 0.54<--, 0.57<--, 0.52<--    COAGS:     CARDIAC MARKERS ( 30 Nov 2017 04:20 )  x     / < 0.06 ng/mL / 95 u/L / 2.41 ng/mL / x      CARDIAC MARKERS ( 29 Nov 2017 20:30 )  x     / < 0.06 ng/mL / x     / x     / x            T(C): 36.5 (12-02-17 @ 13:03), Max: 37.1 (12-01-17 @ 21:15)  HR: 112 (12-02-17 @ 13:03) (112 - 121)  BP: 121/73 (12-02-17 @ 13:03) (112/69 - 129/79)  RR: 17 (12-02-17 @ 13:03) (17 - 18)  SpO2: 99% (12-02-17 @ 13:03) (96% - 99%)  Wt(kg): --    I&O's Summary            HEENT:   Normal oral mucosa, PERRL, EOMI	  Lymphatic: No obvious lymphadenopathy , no edema  Cardiovascular: Normal S1 S2, No JVD, 1/6 RUPERT murmur, Peripheral pulses palpable 2+ bilaterally  Respiratory: Lungs clear to auscultation, normal effort 	  Gastrointestinal:  Soft, Non-tender, + BS	  Skin: No rashes,  No cyanosis, warm to touch  Musculoskeletal: Normal range of motion, normal strength  Psychiatry:  Appropriate Mood & affect     TELEMETRY: 	  PAT        ASSESSMENT/PLAN: 	88y Female hyperlipidemia and HTN who was found to be in SVT at preop testing prior to an elective left partial patellectomy for a patella fracture.     - Cont Amio Load per EP  -  2D echo without structural heart disease  - Anticipate she will revert back to NSR with amio load  - for patellar surgery when optimized    Alexis Francois MD, FACC  [facility], [facility]  [address] [place] [address]  PHONE:  [phone]  BEEPER : [phone]

## 2017-12-03 RX ORDER — METOPROLOL TARTRATE 50 MG
25 TABLET ORAL
Qty: 0 | Refills: 0 | Status: DISCONTINUED | OUTPATIENT
Start: 2017-12-03 | End: 2017-12-03

## 2017-12-03 RX ORDER — METOPROLOL TARTRATE 50 MG
50 TABLET ORAL
Qty: 0 | Refills: 0 | Status: DISCONTINUED | OUTPATIENT
Start: 2017-12-03 | End: 2017-12-04

## 2017-12-03 RX ORDER — LISINOPRIL 2.5 MG/1
5 TABLET ORAL DAILY
Qty: 0 | Refills: 0 | Status: DISCONTINUED | OUTPATIENT
Start: 2017-12-03 | End: 2017-12-03

## 2017-12-03 RX ADMIN — Medication 12.5 MILLIGRAM(S): at 06:35

## 2017-12-03 RX ADMIN — AMIODARONE HYDROCHLORIDE 400 MILLIGRAM(S): 400 TABLET ORAL at 22:06

## 2017-12-03 RX ADMIN — AMIODARONE HYDROCHLORIDE 400 MILLIGRAM(S): 400 TABLET ORAL at 06:36

## 2017-12-03 RX ADMIN — Medication 81 MILLIGRAM(S): at 13:02

## 2017-12-03 RX ADMIN — Medication 3 MILLIGRAM(S): at 22:06

## 2017-12-03 RX ADMIN — Medication 0.5 MILLIGRAM(S): at 22:05

## 2017-12-03 RX ADMIN — Medication 50 MILLIGRAM(S): at 17:05

## 2017-12-03 RX ADMIN — Medication 1 TABLET(S): at 13:02

## 2017-12-03 RX ADMIN — LISINOPRIL 10 MILLIGRAM(S): 2.5 TABLET ORAL at 06:36

## 2017-12-03 RX ADMIN — RISPERIDONE 0.5 MILLIGRAM(S): 4 TABLET ORAL at 22:06

## 2017-12-03 RX ADMIN — ATORVASTATIN CALCIUM 10 MILLIGRAM(S): 80 TABLET, FILM COATED ORAL at 22:05

## 2017-12-03 RX ADMIN — AMIODARONE HYDROCHLORIDE 400 MILLIGRAM(S): 400 TABLET ORAL at 13:02

## 2017-12-03 NOTE — PROGRESS NOTE ADULT - SUBJECTIVE AND OBJECTIVE BOX
Pt denies CP, SOB ROS (-)      acetaminophen   Tablet. 650 milliGRAM(s) Oral every 6 hours PRN  amiodarone    Tablet 400 milliGRAM(s) Oral three times a day  aspirin  chewable 81 milliGRAM(s) Oral daily  atorvastatin 10 milliGRAM(s) Oral at bedtime  hydrochlorothiazide 12.5 milliGRAM(s) Oral daily  lisinopril 10 milliGRAM(s) Oral daily  LORazepam     Tablet 0.5 milliGRAM(s) Oral at bedtime  melatonin 3 milliGRAM(s) Oral at bedtime  multivitamin 1 Tablet(s) Oral daily  risperiDONE   Tablet 0.5 milliGRAM(s) Oral at bedtime                            9.6    6.72  )-----------( 221      ( 02 Dec 2017 06:50 )             30.0       Hemoglobin: 9.6 g/dL (12-02 @ 06:50)  Hemoglobin: 9.4 g/dL (12-01 @ 09:11)  Hemoglobin: 9.1 g/dL (11-30 @ 11:15)  Hemoglobin: 10.4 g/dL (11-30 @ 07:50)  Hemoglobin: 9.5 g/dL (11-29 @ 20:30)            Creatinine Trend: 0.52<--, 0.54<--, 0.57<--, 0.52<--    COAGS:           T(C): 37 (12-03-17 @ 13:00), Max: 37 (12-03-17 @ 13:00)  HR: 107 (12-03-17 @ 13:00) (107 - 111)  BP: 116/71 (12-03-17 @ 13:00) (116/71 - 159/93)  RR: 17 (12-03-17 @ 13:00) (17 - 19)  SpO2: 100% (12-03-17 @ 13:00) (100% - 100%)  Wt(kg): --    I&O's Summary          HEENT:   Normal oral mucosa, PERRL, EOMI	  Lymphatic: No obvious lymphadenopathy , no edema  Cardiovascular: Normal S1 S2, No JVD, 1/6 RUPERT murmur, Peripheral pulses palpable 2+ bilaterally  Respiratory: Lungs clear to auscultation, normal effort 	  Gastrointestinal:  Soft, Non-tender, + BS	  Skin: No rashes,  No cyanosis, warm to touch  Musculoskeletal: Normal range of motion, normal strength  Psychiatry:  Appropriate Mood & affect     TELEMETRY: 	  Atrial tach        ASSESSMENT/PLAN: 	88y Female hyperlipidemia and HTN who was found to be in SVT at preop testing prior to an elective left partial patellectomy for a patella fracture.     - Cont Amio per EP  -  2D echo without structural heart disease  - Remains in an atrial tachycardia but with better rate control  - Start Lopressor 25 mg PO BIB  - Can be considered optimized for her knee surgery from cardiac prospective    Alexis Francois MD, [facility], [facility]  [address] [place] [address]  PHONE:  [phone]  BEEPER : [phone] Pt denies CP, SOB ROS (-)      acetaminophen   Tablet. 650 milliGRAM(s) Oral every 6 hours PRN  amiodarone    Tablet 400 milliGRAM(s) Oral three times a day  aspirin  chewable 81 milliGRAM(s) Oral daily  atorvastatin 10 milliGRAM(s) Oral at bedtime  hydrochlorothiazide 12.5 milliGRAM(s) Oral daily  lisinopril 10 milliGRAM(s) Oral daily  LORazepam     Tablet 0.5 milliGRAM(s) Oral at bedtime  melatonin 3 milliGRAM(s) Oral at bedtime  multivitamin 1 Tablet(s) Oral daily  risperiDONE   Tablet 0.5 milliGRAM(s) Oral at bedtime                            9.6    6.72  )-----------( 221      ( 02 Dec 2017 06:50 )             30.0       Hemoglobin: 9.6 g/dL (12-02 @ 06:50)  Hemoglobin: 9.4 g/dL (12-01 @ 09:11)  Hemoglobin: 9.1 g/dL (11-30 @ 11:15)  Hemoglobin: 10.4 g/dL (11-30 @ 07:50)  Hemoglobin: 9.5 g/dL (11-29 @ 20:30)            Creatinine Trend: 0.52<--, 0.54<--, 0.57<--, 0.52<--    COAGS:           T(C): 37 (12-03-17 @ 13:00), Max: 37 (12-03-17 @ 13:00)  HR: 107 (12-03-17 @ 13:00) (107 - 111)  BP: 116/71 (12-03-17 @ 13:00) (116/71 - 159/93)  RR: 17 (12-03-17 @ 13:00) (17 - 19)  SpO2: 100% (12-03-17 @ 13:00) (100% - 100%)  Wt(kg): --    I&O's Summary          HEENT:   Normal oral mucosa, PERRL, EOMI	  Lymphatic: No obvious lymphadenopathy , no edema  Cardiovascular: Normal S1 S2, No JVD, 1/6 RUPERT murmur, Peripheral pulses palpable 2+ bilaterally  Respiratory: Lungs clear to auscultation, normal effort 	  Gastrointestinal:  Soft, Non-tender, + BS	  Skin: No rashes,  No cyanosis, warm to touch  Musculoskeletal: Normal range of motion, normal strength  Psychiatry:  Appropriate Mood & affect     TELEMETRY: 	  Atrial tach        ASSESSMENT/PLAN: 	88y Female hyperlipidemia and HTN who was found to be in SVT at preop testing prior to an elective left partial patellectomy for a patella fracture.     - Cont Amio per EP  -  2D echo without structural heart disease  - Remains in an atrial tachycardia but with better rate control  - Start Lopressor 25 mg PO BID  - Decrease lisinopril to 5 mg PO BID  - Can be considered optimized for her knee surgery from cardiac prospective    Alexis Francois MD, [facility], [facility]  [address] [place] [address]  PHONE:  [phone]  BEEPER : [phone]

## 2017-12-03 NOTE — PROGRESS NOTE ADULT - SUBJECTIVE AND OBJECTIVE BOX
Patient is a 88y old  Female who presents with a chief complaint of fast heart rate (30 Nov 2017 09:34)      SUBJECTIVE / OVERNIGHT EVENTS: No nausea, vomiting or diarrhea, no fever or chills, no dizziness or chest pain, no dysuria or hematuria, no joint pain or swelling    MEDICATIONS  (STANDING):  amiodarone    Tablet 400 milliGRAM(s) Oral three times a day  aspirin  chewable 81 milliGRAM(s) Oral daily  atorvastatin 10 milliGRAM(s) Oral at bedtime  hydrochlorothiazide 12.5 milliGRAM(s) Oral daily  lisinopril 10 milliGRAM(s) Oral daily  LORazepam     Tablet 0.5 milliGRAM(s) Oral at bedtime  melatonin 3 milliGRAM(s) Oral at bedtime  multivitamin 1 Tablet(s) Oral daily  risperiDONE   Tablet 0.5 milliGRAM(s) Oral at bedtime    MEDICATIONS  (PRN):  acetaminophen   Tablet. 650 milliGRAM(s) Oral every 6 hours PRN mild, moderate pain        CAPILLARY BLOOD GLUCOSE        I&O's Summary    PHYSICAL EXAM:  GENERAL: NAD, asthenic and cachectic   HEAD:  Atraumatic, Normocephalic  EYES: EOMI, PERRLA, conjunctiva and sclera clear  NECK: Supple, No JVD  CHEST/LUNG: Clear to auscultation bilaterally; No wheeze  HEART: S1S2 irregular soft ejection systolic murmur best heard at left sternal border   ABDOMEN: Soft, Nontender, Nondistended; Bowel sounds present  EXTREMITIES:  Peripheral Pulses, No clubbing or cyanosis, no edema  PSYCH: AO x 3,   NEUROLOGY: Alert, no focal motor or sensory deficits  SKIN: No rashes or lesions    LABS:                        9.6    6.72  )-----------( 221      ( 02 Dec 2017 06:50 )             30.0                       Consultant(s) Notes Reviewed:      Care Discussed with Consultants/Other Providers:    Contact Number, Dr Gottlieb [phone]

## 2017-12-03 NOTE — PROGRESS NOTE ADULT - SUBJECTIVE AND OBJECTIVE BOX
tele: atrial tachycardia 112-120s    no palpitations, no syncope, no angina    MEDICATIONS  (STANDING):  amiodarone    Tablet 400 milliGRAM(s) Oral three times a day  aspirin  chewable 81 milliGRAM(s) Oral daily  atorvastatin 10 milliGRAM(s) Oral at bedtime  hydrochlorothiazide 12.5 milliGRAM(s) Oral daily  lisinopril 10 milliGRAM(s) Oral daily  LORazepam     Tablet 0.5 milliGRAM(s) Oral at bedtime  melatonin 3 milliGRAM(s) Oral at bedtime  multivitamin 1 Tablet(s) Oral daily  risperiDONE   Tablet 0.5 milliGRAM(s) Oral at bedtime    MEDICATIONS  (PRN):  acetaminophen   Tablet. 650 milliGRAM(s) Oral every 6 hours PRN mild, moderate pain      LABS:                        9.6    6.72  )-----------( 221      ( 02 Dec 2017 06:50 )             30.0     Hemoglobin: 9.6 g/dL (12-02 @ 06:50)  Hemoglobin: 9.4 g/dL (12-01 @ 09:11)  Hemoglobin: 9.1 g/dL (11-30 @ 11:15)  Hemoglobin: 10.4 g/dL (11-30 @ 07:50)  Hemoglobin: 9.5 g/dL (11-29 @ 20:30)          Creatinine Trend: 0.52<--, 0.54<--, 0.57<--, 0.52<--           PHYSICAL EXAM  Vital Signs Last 24 Hrs  T(C): 37 (03 Dec 2017 13:00), Max: 37 (03 Dec 2017 13:00)  T(F): 98.6 (03 Dec 2017 13:00), Max: 98.6 (03 Dec 2017 13:00)  HR: 107 (03 Dec 2017 13:00) (107 - 111)  BP: 116/71 (03 Dec 2017 13:00) (116/71 - 159/93)  BP(mean): --  RR: 17 (03 Dec 2017 13:00) (17 - 19)  SpO2: 100% (03 Dec 2017 13:00) (100% - 100%)    no JVD  tachy, no murmurs  CTAB  soft nt/nd  no c/c/e    echo   EF 71%  < from: Transthoracic Echocardiogram (12.01.17 @ 15:09) >  1. Mitral annular calcification, otherwise normal mitral  valve. Mild mitral regurgitation.  2. Calcified trileaflet aortic valve with normal opening.  3. Normal left ventricular systolic function. No segmental  wall motion abnormalities.  4. Normal right ventricular size and function.  5. Normal tricuspid valve. Mild tricuspid regurgitation.    < end of copied text >    A/P) She is a pleasant 89 y/o female PMH hyperlipidemia and HTN who was found to be in SVT at preop testing prior to an elective left partial patellectomy for a patella fracture. She is asymptomatic denying palpitations nor syncope. Review of the EKG is consistent with an automatic focal low left atrial tachycardia (positive in V1, negative in II/F/III). There is no evidence of atrial fibrillation nor atrial flutter. Echo is pending, TSH is normal.    -given no evidence of atrial fibrillation nor atrial flutter (yet) she is unlikely to require long term anticoagulation unless of course telemetry shows new AF  -this arrhythmia is not amenable to FLORIN-DCCV (it will just restart)  -continue amiodarone 400mg po tid with food (day 4/7) followed by 200mg daily  -if it doesn't respond to AAD therapy then  will discuss the role of ablation  - echo w/ NL LVS fx

## 2017-12-03 NOTE — PROGRESS NOTE ADULT - SUBJECTIVE AND OBJECTIVE BOX
Orthopaedic Surgery Preop Note    Procedure: L Partial Patellectomy  Surgeon: Marylu                          9.6    6.72  )-----------( 221      ( 02 Dec 2017 06:50 )             30.0     INR: 1.07  BMP: no abnormalities    - EKG in chart  - T/S x 2 done  - UA negative    88y Female to undergo L partial patellectomy  - NPO pMN  - IVF when NPO  - Consent in chart  - Hold AC for OR  - Plan for OR tomorrow

## 2017-12-03 NOTE — PROGRESS NOTE ADULT - SUBJECTIVE AND OBJECTIVE BOX
ORTHO PROGRESS NOTE     Pt seen and examined at bedside.  No significant overnight events. Pain well controlled.    Vital Signs Last 24 Hrs  T(C): 36.7 (03 Dec 2017 06:34), Max: 36.8 (02 Dec 2017 22:13)  T(F): 98 (03 Dec 2017 06:34), Max: 98.3 (02 Dec 2017 22:13)  HR: 108 (03 Dec 2017 06:34) (108 - 112)  BP: 137/89 (03 Dec 2017 06:34) (121/73 - 159/93)  BP(mean): --  RR: 18 (03 Dec 2017 06:34) (17 - 19)  SpO2: 100% (03 Dec 2017 06:34) (99% - 100%)    Gen: No apparent distress, alert    LE:            +DF/PF. moving toes          SILT, wwp at foot          compartments soft    Labs:  CBC Full  -  ( 02 Dec 2017 06:50 )  WBC Count : 6.72 K/uL  Hemoglobin : 9.6 g/dL  Hematocrit : 30.0 %  Platelet Count - Automated : 221 K/uL  Mean Cell Volume : 99.0 fL  Mean Cell Hemoglobin : 31.7 pg  Mean Cell Hemoglobin Concentration : 32.0 %  Auto Neutrophil # : x  Auto Lymphocyte # : x  Auto Monocyte # : x  Auto Eosinophil # : x  Auto Basophil # : x  Auto Neutrophil % : x  Auto Lymphocyte % : x  Auto Monocyte % : x  Auto Eosinophil % : x  Auto Basophil % : x      12-01    142  |  106  |  15  ----------------------------<  108<H>  3.6   |  29  |  0.52    Ca    8.5      01 Dec 2017 09:11        A/P  Pt is a 88y old Female with L patella fracture    - Pain control/ Analgesia  - DVT ppx  -NPO for now  -will still plan for ORIF of patella today if able to be medically optimized. If not, then will plan for ORIF whenever cleared

## 2017-12-04 LAB
APTT BLD: 26.8 SEC — LOW (ref 27.5–37.4)
BASOPHILS # BLD AUTO: 0.01 K/UL — SIGNIFICANT CHANGE UP (ref 0–0.2)
BASOPHILS NFR BLD AUTO: 0.1 % — SIGNIFICANT CHANGE UP (ref 0–2)
BLD GP AB SCN SERPL QL: NEGATIVE — SIGNIFICANT CHANGE UP
BUN SERPL-MCNC: 19 MG/DL — SIGNIFICANT CHANGE UP (ref 7–23)
BUN SERPL-MCNC: 19 MG/DL — SIGNIFICANT CHANGE UP (ref 7–23)
CALCIUM SERPL-MCNC: 8.8 MG/DL — SIGNIFICANT CHANGE UP (ref 8.4–10.5)
CALCIUM SERPL-MCNC: 8.8 MG/DL — SIGNIFICANT CHANGE UP (ref 8.4–10.5)
CHLORIDE SERPL-SCNC: 100 MMOL/L — SIGNIFICANT CHANGE UP (ref 98–107)
CHLORIDE SERPL-SCNC: 100 MMOL/L — SIGNIFICANT CHANGE UP (ref 98–107)
CO2 SERPL-SCNC: 27 MMOL/L — SIGNIFICANT CHANGE UP (ref 22–31)
CO2 SERPL-SCNC: 27 MMOL/L — SIGNIFICANT CHANGE UP (ref 22–31)
CREAT SERPL-MCNC: 0.68 MG/DL — SIGNIFICANT CHANGE UP (ref 0.5–1.3)
CREAT SERPL-MCNC: 0.68 MG/DL — SIGNIFICANT CHANGE UP (ref 0.5–1.3)
EOSINOPHIL # BLD AUTO: 0.08 K/UL — SIGNIFICANT CHANGE UP (ref 0–0.5)
EOSINOPHIL NFR BLD AUTO: 1.1 % — SIGNIFICANT CHANGE UP (ref 0–6)
GLUCOSE SERPL-MCNC: 88 MG/DL — SIGNIFICANT CHANGE UP (ref 70–99)
GLUCOSE SERPL-MCNC: 88 MG/DL — SIGNIFICANT CHANGE UP (ref 70–99)
HCT VFR BLD CALC: 30.9 % — LOW (ref 34.5–45)
HCT VFR BLD CALC: 30.9 % — LOW (ref 34.5–45)
HGB BLD-MCNC: 9.8 G/DL — LOW (ref 11.5–15.5)
HGB BLD-MCNC: 9.8 G/DL — LOW (ref 11.5–15.5)
IMM GRANULOCYTES # BLD AUTO: 0.04 # — SIGNIFICANT CHANGE UP
IMM GRANULOCYTES NFR BLD AUTO: 0.5 % — SIGNIFICANT CHANGE UP (ref 0–1.5)
INR BLD: 1.09 — SIGNIFICANT CHANGE UP (ref 0.88–1.17)
LYMPHOCYTES # BLD AUTO: 0.83 K/UL — LOW (ref 1–3.3)
LYMPHOCYTES # BLD AUTO: 11.3 % — LOW (ref 13–44)
MAGNESIUM SERPL-MCNC: 2.2 MG/DL — SIGNIFICANT CHANGE UP (ref 1.6–2.6)
MCHC RBC-ENTMCNC: 31.4 PG — SIGNIFICANT CHANGE UP (ref 27–34)
MCHC RBC-ENTMCNC: 31.4 PG — SIGNIFICANT CHANGE UP (ref 27–34)
MCHC RBC-ENTMCNC: 31.7 % — LOW (ref 32–36)
MCHC RBC-ENTMCNC: 31.7 % — LOW (ref 32–36)
MCV RBC AUTO: 99 FL — SIGNIFICANT CHANGE UP (ref 80–100)
MCV RBC AUTO: 99 FL — SIGNIFICANT CHANGE UP (ref 80–100)
MONOCYTES # BLD AUTO: 0.83 K/UL — SIGNIFICANT CHANGE UP (ref 0–0.9)
MONOCYTES NFR BLD AUTO: 11.3 % — SIGNIFICANT CHANGE UP (ref 2–14)
NEUTROPHILS # BLD AUTO: 5.56 K/UL — SIGNIFICANT CHANGE UP (ref 1.8–7.4)
NEUTROPHILS NFR BLD AUTO: 75.7 % — SIGNIFICANT CHANGE UP (ref 43–77)
NRBC # FLD: 0 — SIGNIFICANT CHANGE UP
NRBC # FLD: 0 — SIGNIFICANT CHANGE UP
PLATELET # BLD AUTO: 213 K/UL — SIGNIFICANT CHANGE UP (ref 150–400)
PLATELET # BLD AUTO: 213 K/UL — SIGNIFICANT CHANGE UP (ref 150–400)
PMV BLD: 9.6 FL — SIGNIFICANT CHANGE UP (ref 7–13)
PMV BLD: 9.6 FL — SIGNIFICANT CHANGE UP (ref 7–13)
POTASSIUM SERPL-MCNC: 4.3 MMOL/L — SIGNIFICANT CHANGE UP (ref 3.5–5.3)
POTASSIUM SERPL-MCNC: 4.3 MMOL/L — SIGNIFICANT CHANGE UP (ref 3.5–5.3)
POTASSIUM SERPL-SCNC: 4.3 MMOL/L — SIGNIFICANT CHANGE UP (ref 3.5–5.3)
POTASSIUM SERPL-SCNC: 4.3 MMOL/L — SIGNIFICANT CHANGE UP (ref 3.5–5.3)
PROTHROM AB SERPL-ACNC: 12.2 SEC — SIGNIFICANT CHANGE UP (ref 9.8–13.1)
RBC # BLD: 3.12 M/UL — LOW (ref 3.8–5.2)
RBC # BLD: 3.12 M/UL — LOW (ref 3.8–5.2)
RBC # FLD: 14.2 % — SIGNIFICANT CHANGE UP (ref 10.3–14.5)
RBC # FLD: 14.2 % — SIGNIFICANT CHANGE UP (ref 10.3–14.5)
RH IG SCN BLD-IMP: POSITIVE — SIGNIFICANT CHANGE UP
SODIUM SERPL-SCNC: 139 MMOL/L — SIGNIFICANT CHANGE UP (ref 135–145)
SODIUM SERPL-SCNC: 139 MMOL/L — SIGNIFICANT CHANGE UP (ref 135–145)
WBC # BLD: 7.35 K/UL — SIGNIFICANT CHANGE UP (ref 3.8–10.5)
WBC # BLD: 7.35 K/UL — SIGNIFICANT CHANGE UP (ref 3.8–10.5)
WBC # FLD AUTO: 7.35 K/UL — SIGNIFICANT CHANGE UP (ref 3.8–10.5)
WBC # FLD AUTO: 7.35 K/UL — SIGNIFICANT CHANGE UP (ref 3.8–10.5)

## 2017-12-04 PROCEDURE — 93010 ELECTROCARDIOGRAM REPORT: CPT

## 2017-12-04 RX ADMIN — AMIODARONE HYDROCHLORIDE 400 MILLIGRAM(S): 400 TABLET ORAL at 15:09

## 2017-12-04 RX ADMIN — AMIODARONE HYDROCHLORIDE 400 MILLIGRAM(S): 400 TABLET ORAL at 06:33

## 2017-12-04 RX ADMIN — Medication 0.5 MILLIGRAM(S): at 21:10

## 2017-12-04 RX ADMIN — AMIODARONE HYDROCHLORIDE 400 MILLIGRAM(S): 400 TABLET ORAL at 21:10

## 2017-12-04 RX ADMIN — ATORVASTATIN CALCIUM 10 MILLIGRAM(S): 80 TABLET, FILM COATED ORAL at 21:10

## 2017-12-04 RX ADMIN — Medication 81 MILLIGRAM(S): at 12:33

## 2017-12-04 RX ADMIN — Medication 3 MILLIGRAM(S): at 21:10

## 2017-12-04 RX ADMIN — Medication 1 TABLET(S): at 12:33

## 2017-12-04 RX ADMIN — RISPERIDONE 0.5 MILLIGRAM(S): 4 TABLET ORAL at 21:10

## 2017-12-04 RX ADMIN — Medication 50 MILLIGRAM(S): at 06:33

## 2017-12-04 NOTE — PROGRESS NOTE ADULT - SUBJECTIVE AND OBJECTIVE BOX
Orthopedic Surgery Progress Note  S: Pain is well controlled.  No nausea, vomiting, chest pain, shortness of breath, lightheadedness, or dizziness. No acute events overnight. Pain well controlled.    O:  Vital Signs Last 24 Hrs  T(C): 37.1 (04 Dec 2017 06:31), Max: 37.1 (04 Dec 2017 06:31)  T(F): 98.8 (04 Dec 2017 06:31), Max: 98.8 (04 Dec 2017 06:31)  HR: 99 (04 Dec 2017 06:31) (98 - 107)  BP: 110/68 (04 Dec 2017 06:31) (110/68 - 157/95)  RR: 18 (04 Dec 2017 06:31) (17 - 18)  SpO2: 98% (04 Dec 2017 06:31) (98% - 100%)    Gen: NAD  LLE  Mild swelling, TTP on palpation of patella  EHL/FHL/TA/GS intact  SILT DP/SP/Velez/Sa  WWP distally                        9.6    6.72  )-----------( 221      ( 02 Dec 2017 06:50 )             30.0     A/P 88y year old Female with L patella fx    Pain Control  DVT PPx  Cards cleared  Plan is for surgery tomorrow at 4 pm  NPO at MDN  Dispo Planning    Devon Barragan MD Orthopedic Surgery Progress Note  S: Pain is well controlled.  No nausea, vomiting, chest pain, shortness of breath, lightheadedness, or dizziness. No acute events overnight. Pain well controlled.    O:  Vital Signs Last 24 Hrs  T(C): 37.1 (04 Dec 2017 06:31), Max: 37.1 (04 Dec 2017 06:31)  T(F): 98.8 (04 Dec 2017 06:31), Max: 98.8 (04 Dec 2017 06:31)  HR: 99 (04 Dec 2017 06:31) (98 - 107)  BP: 110/68 (04 Dec 2017 06:31) (110/68 - 157/95)  RR: 18 (04 Dec 2017 06:31) (17 - 18)  SpO2: 98% (04 Dec 2017 06:31) (98% - 100%)    Gen: NAD  LLE  Mild swelling, TTP on palpation of patella  EHL/FHL/TA/GS intact  SILT DP/SP/Velez/Sa  WWP distally                        9.6    6.72  )-----------( 221      ( 02 Dec 2017 06:50 )             30.0     A/P 88y year old Female with L patella fx    Pain Control  DVT PPx  Cards cleared  Plan is for surgery tomorrow at 4 pm  NPO at MEHRAN Barragan MD

## 2017-12-04 NOTE — PROGRESS NOTE ADULT - SUBJECTIVE AND OBJECTIVE BOX
Pt denies CP, SOB ROS (-)      acetaminophen   Tablet. 650 milliGRAM(s) Oral every 6 hours PRN  amiodarone    Tablet 400 milliGRAM(s) Oral three times a day  aspirin  chewable 81 milliGRAM(s) Oral daily  atorvastatin 10 milliGRAM(s) Oral at bedtime  LORazepam     Tablet 0.5 milliGRAM(s) Oral at bedtime  melatonin 3 milliGRAM(s) Oral at bedtime  multivitamin 1 Tablet(s) Oral daily  risperiDONE   Tablet 0.5 milliGRAM(s) Oral at bedtime                            9.8    7.35  )-----------( 213      ( 04 Dec 2017 06:00 )             30.9       12-04    139  |  100  |  19  ----------------------------<  88  4.3   |  27  |  0.68    Ca    8.8      04 Dec 2017 06:00  Mg     2.2     12-04              T(C): 36.6 (12-04-17 @ 14:13), Max: 37.1 (12-04-17 @ 06:31)  HR: 63 (12-04-17 @ 15:09) (63 - 99)  BP: 92/51 (12-04-17 @ 14:13) (92/51 - 157/95)  RR: 17 (12-04-17 @ 14:13) (17 - 18)  SpO2: 98% (12-04-17 @ 14:13) (98% - 100%)  Wt(kg): --    I&O's Summary        HEENT:   Normal oral mucosa, PERRL, EOMI	  Lymphatic: No obvious lymphadenopathy , no edema  Cardiovascular: Normal S1 S2, No JVD, 1/6 RUPERT murmur, Peripheral pulses palpable 2+ bilaterally  Respiratory: Lungs clear to auscultation, normal effort 	  Gastrointestinal:  Soft, Non-tender, + BS	  Skin: No rashes,  No cyanosis, warm to touch  Musculoskeletal: Normal range of motion, normal strength  Psychiatry:  Appropriate Mood & affect     TELEMETRY:  SR      ASSESSMENT/PLAN: 	88y Female hyperlipidemia and HTN who was found to be in SVT at preop testing prior to an elective left partial patellectomy for a patella fracture.     - Cont Amio per EP  -  TTE with normal LV function  - pt. broke into nsr   - optimized from cv perspective for orthopedic surgery     Taryn Ayala MD  [facility]  [address], [id number]  [place] [address]  office: [phone]  pager: [phone]

## 2017-12-04 NOTE — PROGRESS NOTE ADULT - SUBJECTIVE AND OBJECTIVE BOX
tele: SR     no palpitations, no syncope, no angina      MEDICATIONS  (STANDING):  amiodarone    Tablet 400 milliGRAM(s) Oral three times a day  aspirin  chewable 81 milliGRAM(s) Oral daily  atorvastatin 10 milliGRAM(s) Oral at bedtime  LORazepam     Tablet 0.5 milliGRAM(s) Oral at bedtime  melatonin 3 milliGRAM(s) Oral at bedtime  metoprolol     tartrate 50 milliGRAM(s) Oral two times a day  multivitamin 1 Tablet(s) Oral daily  risperiDONE   Tablet 0.5 milliGRAM(s) Oral at bedtime    LABS:                        9.8    7.35  )-----------( 213      ( 04 Dec 2017 06:00 )             30.9     139  |  100  |  19  ----------------------------<  88  4.3   |  27  |  0.68    Ca    8.8      04 Dec 2017 06:00  Mg     2.2     12-04      Creatinine Trend: 0.68<--, 0.52<--, 0.54<--, 0.57<--, 0.52<--   PT/INR - ( 04 Dec 2017 06:00 )   PT: 12.2 SEC;   INR: 1.09     PTT - ( 04 Dec 2017 06:00 )  PTT:26.8 SEC    PHYSICAL EXAM  Vital Signs Last 24 Hrs  T(C): 36.6 (04 Dec 2017 14:13), Max: 37.1 (04 Dec 2017 06:31)  T(F): 97.9 (04 Dec 2017 14:13), Max: 98.8 (04 Dec 2017 06:31)  HR: 63 (04 Dec 2017 15:09) (63 - 102)  BP: 92/51 (04 Dec 2017 14:13) (92/51 - 157/95)  RR: 17 (04 Dec 2017 14:13) (17 - 18)  SpO2: 98% (04 Dec 2017 14:13) (98% - 100%)      no JVD  tachy, no murmurs  CTAB  soft nt/nd  no c/c/e    DATA:  < from: Transthoracic Echocardiogram (12.01.17 @ 15:09) >  1. Mitral annular calcification, otherwise normal mitral  valve. Mild mitral regurgitation.  2. Calcified trileaflet aortic valve with normal opening.  3. Normal left ventricular systolic function. No segmental  wall motion abnormalities.  4. Normal right ventricular size and function.  5. Normal tricuspid valve. Mild tricuspid regurgitation.    < end of copied text >    A/P) She is a pleasant 87 y/o female PMH hyperlipidemia and HTN who was found to be in SVT at preop testing prior to an elective left partial patellectomy for a patella fracture. She is asymptomatic denying palpitations nor syncope. Review of the EKG is consistent with an automatic focal low left atrial tachycardia (positive in V1, negative in II/F/III). There is no evidence of atrial fibrillation nor atrial flutter. Echo is pending, TSH is normal.    -given no evidence of atrial fibrillation nor atrial flutter (yet) she is unlikely to require long term anticoagulation unless of course telemetry shows new AF  -this arrhythmia is not amenable to FLORIN-DCCV (it will just restart)  -continue amiodarone 400mg po tid with food x 7 days, followed by 200mg daily  -if it doesn't respond to AAD therapy then will discuss the role of ablation  -TTE w/ NL LV systolic fxn tele: SR     no palpitations, no syncope, no angina      MEDICATIONS  (STANDING):  amiodarone    Tablet 400 milliGRAM(s) Oral three times a day  aspirin  chewable 81 milliGRAM(s) Oral daily  atorvastatin 10 milliGRAM(s) Oral at bedtime  LORazepam     Tablet 0.5 milliGRAM(s) Oral at bedtime  melatonin 3 milliGRAM(s) Oral at bedtime  metoprolol     tartrate 50 milliGRAM(s) Oral two times a day  multivitamin 1 Tablet(s) Oral daily  risperiDONE   Tablet 0.5 milliGRAM(s) Oral at bedtime    LABS:                        9.8    7.35  )-----------( 213      ( 04 Dec 2017 06:00 )             30.9     139  |  100  |  19  ----------------------------<  88  4.3   |  27  |  0.68    Ca    8.8      04 Dec 2017 06:00  Mg     2.2     12-04      Creatinine Trend: 0.68<--, 0.52<--, 0.54<--, 0.57<--, 0.52<--   PT/INR - ( 04 Dec 2017 06:00 )   PT: 12.2 SEC;   INR: 1.09     PTT - ( 04 Dec 2017 06:00 )  PTT:26.8 SEC    PHYSICAL EXAM  Vital Signs Last 24 Hrs  T(C): 36.6 (04 Dec 2017 14:13), Max: 37.1 (04 Dec 2017 06:31)  T(F): 97.9 (04 Dec 2017 14:13), Max: 98.8 (04 Dec 2017 06:31)  HR: 63 (04 Dec 2017 15:09) (63 - 102)  BP: 92/51 (04 Dec 2017 14:13) (92/51 - 157/95)  RR: 17 (04 Dec 2017 14:13) (17 - 18)  SpO2: 98% (04 Dec 2017 14:13) (98% - 100%)      no JVD  tachy, no murmurs  CTAB  soft nt/nd  no c/c/e    DATA:  < from: Transthoracic Echocardiogram (12.01.17 @ 15:09) >  1. Mitral annular calcification, otherwise normal mitral  valve. Mild mitral regurgitation.  2. Calcified trileaflet aortic valve with normal opening.  3. Normal left ventricular systolic function. No segmental  wall motion abnormalities.  4. Normal right ventricular size and function.  5. Normal tricuspid valve. Mild tricuspid regurgitation.    < end of copied text >    A/P) She is a pleasant 89 y/o female PMH hyperlipidemia and HTN who was found to be in SVT at preop testing prior to an elective left partial patellectomy for a patella fracture. She is asymptomatic denying palpitations nor syncope. Review of the EKG is consistent with an automatic focal low left atrial tachycardia (positive in V1, negative in II/F/III). There is no evidence of atrial fibrillation nor atrial flutter. Echo is pending, TSH is normal.    -given no evidence of atrial fibrillation nor atrial flutter (yet) she is unlikely to require long term anticoagulation unless of course telemetry shows new AF  -this arrhythmia is not amenable to FLORIN-DCCV (it will just restart)  -continue amiodarone 400mg po tid with food x 7 days, followed by 200mg daily  -TTE w/ NL LV systolic fxn

## 2017-12-04 NOTE — PROGRESS NOTE ADULT - SUBJECTIVE AND OBJECTIVE BOX
Patient is a 88y old  Female who presents with a chief complaint of fast heart rate (30 Nov 2017 09:34)      SUBJECTIVE / OVERNIGHT EVENTS: No nausea, vomiting or diarrhea, no fever or chills, no dizziness or chest pain, no dysuria or hematuria, no joint pain or swelling      MEDICATIONS  (STANDING):  amiodarone    Tablet 400 milliGRAM(s) Oral three times a day  aspirin  chewable 81 milliGRAM(s) Oral daily  atorvastatin 10 milliGRAM(s) Oral at bedtime  LORazepam     Tablet 0.5 milliGRAM(s) Oral at bedtime  melatonin 3 milliGRAM(s) Oral at bedtime  metoprolol     tartrate 50 milliGRAM(s) Oral two times a day  multivitamin 1 Tablet(s) Oral daily  risperiDONE   Tablet 0.5 milliGRAM(s) Oral at bedtime    MEDICATIONS  (PRN):  acetaminophen   Tablet. 650 milliGRAM(s) Oral every 6 hours PRN mild, moderate pain      PHYSICAL EXAM:  GENERAL: NAD, well-developed  HEAD:  Atraumatic, Normocephalic  EYES: EOMI, PERRLA, conjunctiva and sclera clear  NECK: Supple, No JVD  CHEST/LUNG: no rhonchi, no wheeze, clear to auscultation bilaterally  HEART: S1 S2; No rubs or gallops, no murmurs  ABDOMEN: Soft, Nontender, Nondistended; Bowel sounds present  EXTREMITIES:  No clubbing or cyanosis, + Peripheral Pulses,  no edema  PSYCH: AO x 3 appropriate affect  NEUROLOGY: non-focal, motor and sensory systems intact  SKIN: No rashes or lesions    LABS:                        9.8    7.35  )-----------( 213      ( 04 Dec 2017 06:00 )             30.9     12-04    139  |  100  |  19  ----------------------------<  88  4.3   |  27  |  0.68    Ca    8.8      04 Dec 2017 06:00  Mg     2.2     12-04      PT/INR - ( 04 Dec 2017 06:00 )   PT: 12.2 SEC;   INR: 1.09          PTT - ( 04 Dec 2017 06:00 )  PTT:26.8 SEC            Consultant(s) Notes Reviewed:      Care Discussed with Consultants/Other Providers:    Contact Number, Dr Gottlieb [phone]

## 2017-12-05 ENCOUNTER — TRANSCRIPTION ENCOUNTER (OUTPATIENT)
Age: 82
End: 2017-12-05

## 2017-12-05 LAB
BASOPHILS # BLD AUTO: 0 K/UL — SIGNIFICANT CHANGE UP (ref 0–0.2)
BASOPHILS NFR BLD AUTO: 0 % — SIGNIFICANT CHANGE UP (ref 0–2)
BLD GP AB SCN SERPL QL: NEGATIVE — SIGNIFICANT CHANGE UP
BUN SERPL-MCNC: 20 MG/DL — SIGNIFICANT CHANGE UP (ref 7–23)
BUN SERPL-MCNC: 22 MG/DL — SIGNIFICANT CHANGE UP (ref 7–23)
CALCIUM SERPL-MCNC: 8.4 MG/DL — SIGNIFICANT CHANGE UP (ref 8.4–10.5)
CALCIUM SERPL-MCNC: 8.9 MG/DL — SIGNIFICANT CHANGE UP (ref 8.4–10.5)
CHLORIDE SERPL-SCNC: 100 MMOL/L — SIGNIFICANT CHANGE UP (ref 98–107)
CHLORIDE SERPL-SCNC: 101 MMOL/L — SIGNIFICANT CHANGE UP (ref 98–107)
CO2 SERPL-SCNC: 24 MMOL/L — SIGNIFICANT CHANGE UP (ref 22–31)
CO2 SERPL-SCNC: 26 MMOL/L — SIGNIFICANT CHANGE UP (ref 22–31)
CREAT SERPL-MCNC: 0.57 MG/DL — SIGNIFICANT CHANGE UP (ref 0.5–1.3)
CREAT SERPL-MCNC: 0.59 MG/DL — SIGNIFICANT CHANGE UP (ref 0.5–1.3)
EOSINOPHIL # BLD AUTO: 0 K/UL — SIGNIFICANT CHANGE UP (ref 0–0.5)
EOSINOPHIL NFR BLD AUTO: 0 % — SIGNIFICANT CHANGE UP (ref 0–6)
GLUCOSE SERPL-MCNC: 118 MG/DL — HIGH (ref 70–99)
GLUCOSE SERPL-MCNC: 159 MG/DL — HIGH (ref 70–99)
HCT VFR BLD CALC: 29 % — LOW (ref 34.5–45)
HCT VFR BLD CALC: 31.9 % — LOW (ref 34.5–45)
HGB BLD-MCNC: 10.1 G/DL — LOW (ref 11.5–15.5)
HGB BLD-MCNC: 9.3 G/DL — LOW (ref 11.5–15.5)
IMM GRANULOCYTES # BLD AUTO: 0.06 # — SIGNIFICANT CHANGE UP
IMM GRANULOCYTES NFR BLD AUTO: 0.6 % — SIGNIFICANT CHANGE UP (ref 0–1.5)
LYMPHOCYTES # BLD AUTO: 0.52 K/UL — LOW (ref 1–3.3)
LYMPHOCYTES # BLD AUTO: 5.5 % — LOW (ref 13–44)
MCHC RBC-ENTMCNC: 31.7 % — LOW (ref 32–36)
MCHC RBC-ENTMCNC: 31.9 PG — SIGNIFICANT CHANGE UP (ref 27–34)
MCHC RBC-ENTMCNC: 32.1 % — SIGNIFICANT CHANGE UP (ref 32–36)
MCHC RBC-ENTMCNC: 32.2 PG — SIGNIFICANT CHANGE UP (ref 27–34)
MCV RBC AUTO: 100.3 FL — HIGH (ref 80–100)
MCV RBC AUTO: 100.6 FL — HIGH (ref 80–100)
MONOCYTES # BLD AUTO: 0.76 K/UL — SIGNIFICANT CHANGE UP (ref 0–0.9)
MONOCYTES NFR BLD AUTO: 8 % — SIGNIFICANT CHANGE UP (ref 2–14)
NEUTROPHILS # BLD AUTO: 8.15 K/UL — HIGH (ref 1.8–7.4)
NEUTROPHILS NFR BLD AUTO: 85.9 % — HIGH (ref 43–77)
NRBC # FLD: 0 — SIGNIFICANT CHANGE UP
NRBC # FLD: 0 — SIGNIFICANT CHANGE UP
PLATELET # BLD AUTO: 194 K/UL — SIGNIFICANT CHANGE UP (ref 150–400)
PLATELET # BLD AUTO: 206 K/UL — SIGNIFICANT CHANGE UP (ref 150–400)
PMV BLD: 10 FL — SIGNIFICANT CHANGE UP (ref 7–13)
PMV BLD: 9.5 FL — SIGNIFICANT CHANGE UP (ref 7–13)
POTASSIUM SERPL-MCNC: 3.9 MMOL/L — SIGNIFICANT CHANGE UP (ref 3.5–5.3)
POTASSIUM SERPL-MCNC: 4.2 MMOL/L — SIGNIFICANT CHANGE UP (ref 3.5–5.3)
POTASSIUM SERPL-SCNC: 3.9 MMOL/L — SIGNIFICANT CHANGE UP (ref 3.5–5.3)
POTASSIUM SERPL-SCNC: 4.2 MMOL/L — SIGNIFICANT CHANGE UP (ref 3.5–5.3)
RBC # BLD: 2.89 M/UL — LOW (ref 3.8–5.2)
RBC # BLD: 3.17 M/UL — LOW (ref 3.8–5.2)
RBC # FLD: 13.9 % — SIGNIFICANT CHANGE UP (ref 10.3–14.5)
RBC # FLD: 14 % — SIGNIFICANT CHANGE UP (ref 10.3–14.5)
RH IG SCN BLD-IMP: POSITIVE — SIGNIFICANT CHANGE UP
SODIUM SERPL-SCNC: 139 MMOL/L — SIGNIFICANT CHANGE UP (ref 135–145)
SODIUM SERPL-SCNC: 140 MMOL/L — SIGNIFICANT CHANGE UP (ref 135–145)
WBC # BLD: 10.24 K/UL — SIGNIFICANT CHANGE UP (ref 3.8–10.5)
WBC # BLD: 9.49 K/UL — SIGNIFICANT CHANGE UP (ref 3.8–10.5)
WBC # FLD AUTO: 10.24 K/UL — SIGNIFICANT CHANGE UP (ref 3.8–10.5)
WBC # FLD AUTO: 9.49 K/UL — SIGNIFICANT CHANGE UP (ref 3.8–10.5)

## 2017-12-05 PROCEDURE — 27524 TREAT KNEECAP FRACTURE: CPT | Mod: LT

## 2017-12-05 RX ORDER — SODIUM CHLORIDE 9 MG/ML
1000 INJECTION, SOLUTION INTRAVENOUS
Qty: 0 | Refills: 0 | Status: DISCONTINUED | OUTPATIENT
Start: 2017-12-05 | End: 2017-12-06

## 2017-12-05 RX ORDER — ONDANSETRON 8 MG/1
4 TABLET, FILM COATED ORAL ONCE
Qty: 0 | Refills: 0 | Status: COMPLETED | OUTPATIENT
Start: 2017-12-05 | End: 2017-12-05

## 2017-12-05 RX ORDER — LABETALOL HCL 100 MG
5 TABLET ORAL ONCE
Qty: 0 | Refills: 0 | Status: COMPLETED | OUTPATIENT
Start: 2017-12-05 | End: 2017-12-05

## 2017-12-05 RX ORDER — CEFAZOLIN SODIUM 1 G
2000 VIAL (EA) INJECTION EVERY 8 HOURS
Qty: 0 | Refills: 0 | Status: COMPLETED | OUTPATIENT
Start: 2017-12-06 | End: 2017-12-06

## 2017-12-05 RX ORDER — SODIUM CHLORIDE 9 MG/ML
500 INJECTION, SOLUTION INTRAVENOUS ONCE
Qty: 0 | Refills: 0 | Status: COMPLETED | OUTPATIENT
Start: 2017-12-05 | End: 2017-12-05

## 2017-12-05 RX ORDER — OXYCODONE HYDROCHLORIDE 5 MG/1
5 TABLET ORAL EVERY 4 HOURS
Qty: 0 | Refills: 0 | Status: DISCONTINUED | OUTPATIENT
Start: 2017-12-05 | End: 2017-12-06

## 2017-12-05 RX ORDER — FENTANYL CITRATE 50 UG/ML
25 INJECTION INTRAVENOUS
Qty: 0 | Refills: 0 | Status: DISCONTINUED | OUTPATIENT
Start: 2017-12-05 | End: 2017-12-06

## 2017-12-05 RX ORDER — FENTANYL CITRATE 50 UG/ML
50 INJECTION INTRAVENOUS
Qty: 0 | Refills: 0 | Status: DISCONTINUED | OUTPATIENT
Start: 2017-12-05 | End: 2017-12-06

## 2017-12-05 RX ORDER — ONDANSETRON 8 MG/1
4 TABLET, FILM COATED ORAL EVERY 4 HOURS
Qty: 0 | Refills: 0 | Status: DISCONTINUED | OUTPATIENT
Start: 2017-12-05 | End: 2017-12-06

## 2017-12-05 RX ADMIN — SODIUM CHLORIDE 75 MILLILITER(S): 9 INJECTION, SOLUTION INTRAVENOUS at 23:10

## 2017-12-05 RX ADMIN — Medication 5 MILLIGRAM(S): at 20:20

## 2017-12-05 RX ADMIN — OXYCODONE HYDROCHLORIDE 5 MILLIGRAM(S): 5 TABLET ORAL at 23:34

## 2017-12-05 RX ADMIN — SODIUM CHLORIDE 1000 MILLILITER(S): 9 INJECTION, SOLUTION INTRAVENOUS at 19:27

## 2017-12-05 RX ADMIN — ONDANSETRON 4 MILLIGRAM(S): 8 TABLET, FILM COATED ORAL at 03:19

## 2017-12-05 RX ADMIN — ONDANSETRON 4 MILLIGRAM(S): 8 TABLET, FILM COATED ORAL at 19:27

## 2017-12-05 RX ADMIN — Medication 3 MILLIGRAM(S): at 22:34

## 2017-12-05 RX ADMIN — Medication 0.5 MILLIGRAM(S): at 23:11

## 2017-12-05 RX ADMIN — ATORVASTATIN CALCIUM 10 MILLIGRAM(S): 80 TABLET, FILM COATED ORAL at 22:34

## 2017-12-05 RX ADMIN — AMIODARONE HYDROCHLORIDE 400 MILLIGRAM(S): 400 TABLET ORAL at 05:13

## 2017-12-05 RX ADMIN — OXYCODONE HYDROCHLORIDE 5 MILLIGRAM(S): 5 TABLET ORAL at 22:34

## 2017-12-05 RX ADMIN — RISPERIDONE 0.5 MILLIGRAM(S): 4 TABLET ORAL at 22:34

## 2017-12-05 NOTE — PROGRESS NOTE ADULT - SUBJECTIVE AND OBJECTIVE BOX
EP ATTENDING    tele: AT broke to NSR at 10 am yesterday    no palpitations, no syncope, no angina    acetaminophen   Tablet. 650 milliGRAM(s) Oral every 6 hours PRN  amiodarone    Tablet 400 milliGRAM(s) Oral three times a day  aspirin  chewable 81 milliGRAM(s) Oral daily  atorvastatin 10 milliGRAM(s) Oral at bedtime  LORazepam     Tablet 0.5 milliGRAM(s) Oral at bedtime  melatonin 3 milliGRAM(s) Oral at bedtime  multivitamin 1 Tablet(s) Oral daily  risperiDONE   Tablet 0.5 milliGRAM(s) Oral at bedtime                            9.3    10.24 )-----------( 194      ( 05 Dec 2017 06:40 )             29.0       12-05    139  |  100  |  22  ----------------------------<  159<H>  3.9   |  24  |  0.59    Ca    8.4      05 Dec 2017 06:40  Mg     2.2     12-04      T(C): 36.5 (12-05-17 @ 05:12), Max: 36.7 (12-04-17 @ 21:08)  HR: 60 (12-05-17 @ 05:12) (60 - 71)  BP: 122/58 (12-05-17 @ 05:12) (92/51 - 133/74)  RR: 18 (12-05-17 @ 05:12) (17 - 18)  SpO2: 99% (12-05-17 @ 05:12) (98% - 100%)  Wt(kg): --    no JVD  RRR, no murmurs  CTAB  soft nt/nd  no c/c/e    echo normal      A/P) She is a pleasant 87 y/o female PMH hyperlipidemia and HTN who was found to be in SVT at preop testing prior to an elective left partial patellectomy for a patella fracture. She is asymptomatic denying palpitations nor syncope. Review of the EKG is consistent with an automatic focal low left atrial tachycardia (positive in V1, negative in II/F/III). There is no evidence of atrial fibrillation nor atrial flutter. Echo and TSH are normal. AT broke yesterday with several days of amio loading.    -given no evidence of atrial fibrillation nor atrial flutter (yet) she is unlikely to require long term anticoagulation  -continue amiodarone 400mg po tid with food x 7 days, followed by 200mg daily  -no further inpatient EP workup needed

## 2017-12-05 NOTE — PROGRESS NOTE ADULT - SUBJECTIVE AND OBJECTIVE BOX
Orthopedic Surgery Progress Note  S: Pain is well controlled.  Resting comfortably. No nausea, vomiting, chest pain, shortness of breath, lightheadedness, or dizziness. Patient ready for surgery today.    O:  Vital Signs Last 24 Hrs  T(C): 36.5 (05 Dec 2017 05:12), Max: 37.1 (04 Dec 2017 06:31)  T(F): 97.7 (05 Dec 2017 05:12), Max: 98.8 (04 Dec 2017 06:31)  HR: 60 (05 Dec 2017 05:12) (60 - 99)  BP: 122/58 (05 Dec 2017 05:12) (92/51 - 133/74)  RR: 18 (05 Dec 2017 05:12) (17 - 18)  SpO2: 99% (05 Dec 2017 05:12) (98% - 100%)    Gen: NAD  LLE  L patella mildly TTP, mild swelling of knee present, no eythema/warmth  EHL/FHL/TA/GS intact  SILT DP/SP/Velez/Sa  WWP distally                   9.8    7.35  )-----------( 213      ( 04 Dec 2017 06:00 )             30.9     12-04    139  |  100  |  19  ----------------------------<  88  4.3   |  27  |  0.68    PT/INR - ( 04 Dec 2017 06:00 )   PT: 12.2 SEC;   INR: 1.09        PTT - ( 04 Dec 2017 06:00 )  PTT:26.8 SEC    A/P 88y year old Female with L patella fracture    Pain Control  NPO/IVF  OR today for ORIF patella fx vs. partial patellectomy  Cards cleared  Acute care per primary team  Dispo Planning    Devon Barragan MD

## 2017-12-05 NOTE — CHART NOTE - NSCHARTNOTEFT_GEN_A_CORE
Ortho Post-Op Check    Pt seen and examined.   Doing OK.   pain controlled.     Vital Signs Last 24 Hrs  T(C): 36.4 (05 Dec 2017 20:00), Max: 37.2 (05 Dec 2017 07:38)  T(F): 97.5 (05 Dec 2017 20:00), Max: 98.9 (05 Dec 2017 07:38)  HR: 65 (05 Dec 2017 20:00) (60 - 71)  BP: 160/66 (05 Dec 2017 20:00) (122/58 - 162/66)  BP(mean): --  RR: 19 (05 Dec 2017 20:00) (12 - 19)  SpO2: 99% (05 Dec 2017 20:00) (95% - 100%)    PE:  NAD  LLE in [facility] dressing  EHL/FHL/TA/GS intact  SILT SP/DP/T/S/S  Compartments soft and compressible  WWP brisk cap refill                          10.1   9.49  )-----------( 206      ( 05 Dec 2017 18:45 )             31.9     12-05    140  |  101  |  20  ----------------------------<  118<H>  4.2   |  26  |  0.57    Ca    8.9      05 Dec 2017 18:45  Mg     2.2     12-04        88yFemale s/p L knee partial patellectomy  - WBAT in [facility]  - Jennifer ordered, once arrives can be WBAT in Jennifer locked in extension  - PT/OOB  - IS  - Pain control  - Aspirin 81 and early ambulation for DVT ppx  - Care per primary team appreciated

## 2017-12-05 NOTE — PROGRESS NOTE ADULT - SUBJECTIVE AND OBJECTIVE BOX
Subjective: No CP or SOB    MEDICATIONS  (STANDING):  amiodarone    Tablet 400 milliGRAM(s) Oral three times a day  aspirin  chewable 81 milliGRAM(s) Oral daily  atorvastatin 10 milliGRAM(s) Oral at bedtime  LORazepam     Tablet 0.5 milliGRAM(s) Oral at bedtime  melatonin 3 milliGRAM(s) Oral at bedtime  multivitamin 1 Tablet(s) Oral daily  risperiDONE   Tablet 0.5 milliGRAM(s) Oral at bedtime    LABS:                        9.3    10.24 )-----------( 194      ( 05 Dec 2017 06:40 )             29.0     139  |  100  |  22  ----------------------------<  159<H>  3.9   |  24  |  0.59    Ca    8.4      05 Dec 2017 06:40  Mg     2.2     12-04    Creatinine Trend: 0.59<--, 0.68<--, 0.52<--, 0.54<--, 0.57<--, 0.52<--     PHYSICAL EXAM  Vital Signs Last 24 Hrs  T(C): 36.5 (05 Dec 2017 12:52), Max: 36.7 (04 Dec 2017 21:08)  T(F): 97.7 (05 Dec 2017 12:52), Max: 98.1 (04 Dec 2017 21:08)  HR: 63 (05 Dec 2017 12:52) (60 - 71)  BP: 125/71 (05 Dec 2017 12:52) (122/58 - 133/74)  RR: 18 (05 Dec 2017 05:12) (18 - 18)  SpO2: 95% (05 Dec 2017 12:52) (95% - 100%)    HEENT:   Normal oral mucosa, PERRL, EOMI	  Lymphatic: No obvious lymphadenopathy , no edema  Cardiovascular: Normal S1 S2, No JVD, 1/6 RUPERT murmur, Peripheral pulses palpable 2+ bilaterally  Respiratory: Lungs clear to auscultation, normal effort 	  Gastrointestinal:  Soft, Non-tender, + BS	  Skin: No rashes,  No cyanosis, warm to touch  Musculoskeletal: Normal range of motion, normal strength  Psychiatry:  Appropriate Mood & affect     TELEMETRY:  AT-->SR    < from: Transthoracic Echocardiogram (12.01.17 @ 15:09) >  CONCLUSIONS:  1. Mitral annular calcification, otherwise normal mitral  valve. Mild mitral regurgitation.  2. Calcified trileaflet aortic valve with normal opening.  3. Normal left ventricular systolic function. No segmental  wall motion abnormalities.  4. Normal right ventricular size and function.  5. Normal tricuspid valve. Mild tricuspid regurgitation.  ------------------------------------------------------------------------  Confirmed on  12/1/2017 - 17:05:09 by Andria Mendoza M.D. RPVI    < end of copied text >    ASSESSMENT/PLAN: 	88y Female hyperlipidemia and HTN who was found to be in Atrial Tachycardia at preop testing prior to an elective left partial patellectomy for a patella fracture.     - Cont Amio per EP, remains in SR  -  TTE with normal LV function  - optimized from cv perspective for orthopedic surgery     Shayy Aquino PA-C  [facility]  [address], [address]   [place] [address]  office [phone]  pager [phone]

## 2017-12-05 NOTE — PROGRESS NOTE ADULT - SUBJECTIVE AND OBJECTIVE BOX
Patient is a 88y old  Female who presents with a chief complaint of fast heart rate (30 Nov 2017 09:34)      SUBJECTIVE / OVERNIGHT EVENTS: No nausea, vomiting or diarrhea, no fever or chills, no dizziness or chest pain, no dysuria or hematuria, no joint pain or swelling  occasional right knee pain    MEDICATIONS  (STANDING):  amiodarone    Tablet 400 milliGRAM(s) Oral three times a day  aspirin  chewable 81 milliGRAM(s) Oral daily  atorvastatin 10 milliGRAM(s) Oral at bedtime  LORazepam     Tablet 0.5 milliGRAM(s) Oral at bedtime  melatonin 3 milliGRAM(s) Oral at bedtime  multivitamin 1 Tablet(s) Oral daily  risperiDONE   Tablet 0.5 milliGRAM(s) Oral at bedtime    MEDICATIONS  (PRN):  acetaminophen   Tablet. 650 milliGRAM(s) Oral every 6 hours PRN mild, moderate pain        CAPILLARY BLOOD GLUCOSE        I&O's Summary    PHYSICAL EXAM: vital signs as above  in no apparent distress asthenic  HEENT: LONI EOMI  Neck: Supple, no JVD, no thyromegaly  Lungs: no rhonchi, no wheeze, no crackles  CVS: S1 S2 no M/R/G  Abdomen: no tenderness, no organomegaly, BS present  Neuro: AO x 3 no focal weakness, no sensory abnormalities  Psych: appropriate affect  Skin: warm, dry  Ext: no cyanosis or clubbing, no edema left patellar tenderness  Msk: no joint swelling or deformities  Back: no CVA tenderness, no kyphosis/scoliosis     LABS:                        9.3    10.24 )-----------( 194      ( 05 Dec 2017 06:40 )             29.0     12-05    139  |  100  |  22  ----------------------------<  159<H>  3.9   |  24  |  0.59    Ca    8.4      05 Dec 2017 06:40  Mg     2.2     12-04      PT/INR - ( 04 Dec 2017 06:00 )   PT: 12.2 SEC;   INR: 1.09          PTT - ( 04 Dec 2017 06:00 )  PTT:26.8 SEC            Consultant(s) Notes Reviewed:      Care Discussed with Consultants/Other Providers:    Contact Number, Dr Gottlieb [phone]

## 2017-12-06 ENCOUNTER — TRANSCRIPTION ENCOUNTER (OUTPATIENT)
Age: 82
End: 2017-12-06

## 2017-12-06 VITALS
SYSTOLIC BLOOD PRESSURE: 136 MMHG | RESPIRATION RATE: 18 BRPM | HEART RATE: 66 BPM | TEMPERATURE: 98 F | OXYGEN SATURATION: 96 % | DIASTOLIC BLOOD PRESSURE: 68 MMHG

## 2017-12-06 LAB
BUN SERPL-MCNC: 21 MG/DL — SIGNIFICANT CHANGE UP (ref 7–23)
CALCIUM SERPL-MCNC: 8.7 MG/DL — SIGNIFICANT CHANGE UP (ref 8.4–10.5)
CHLORIDE SERPL-SCNC: 100 MMOL/L — SIGNIFICANT CHANGE UP (ref 98–107)
CO2 SERPL-SCNC: 26 MMOL/L — SIGNIFICANT CHANGE UP (ref 22–31)
CREAT SERPL-MCNC: 0.57 MG/DL — SIGNIFICANT CHANGE UP (ref 0.5–1.3)
GLUCOSE SERPL-MCNC: 132 MG/DL — HIGH (ref 70–99)
HCT VFR BLD CALC: 27.4 % — LOW (ref 34.5–45)
HGB BLD-MCNC: 8.8 G/DL — LOW (ref 11.5–15.5)
MCHC RBC-ENTMCNC: 31.8 PG — SIGNIFICANT CHANGE UP (ref 27–34)
MCHC RBC-ENTMCNC: 32.1 % — SIGNIFICANT CHANGE UP (ref 32–36)
MCV RBC AUTO: 98.9 FL — SIGNIFICANT CHANGE UP (ref 80–100)
NRBC # FLD: 0 — SIGNIFICANT CHANGE UP
PLATELET # BLD AUTO: 183 K/UL — SIGNIFICANT CHANGE UP (ref 150–400)
PMV BLD: 10 FL — SIGNIFICANT CHANGE UP (ref 7–13)
POTASSIUM SERPL-MCNC: 4.2 MMOL/L — SIGNIFICANT CHANGE UP (ref 3.5–5.3)
POTASSIUM SERPL-SCNC: 4.2 MMOL/L — SIGNIFICANT CHANGE UP (ref 3.5–5.3)
RBC # BLD: 2.77 M/UL — LOW (ref 3.8–5.2)
RBC # FLD: 14.1 % — SIGNIFICANT CHANGE UP (ref 10.3–14.5)
SODIUM SERPL-SCNC: 138 MMOL/L — SIGNIFICANT CHANGE UP (ref 135–145)
WBC # BLD: 8.96 K/UL — SIGNIFICANT CHANGE UP (ref 3.8–10.5)
WBC # FLD AUTO: 8.96 K/UL — SIGNIFICANT CHANGE UP (ref 3.8–10.5)

## 2017-12-06 RX ORDER — OMEGA-3 ACID ETHYL ESTERS 1 G
0 CAPSULE ORAL
Qty: 0 | Refills: 0 | COMMUNITY

## 2017-12-06 RX ORDER — AMLODIPINE BESYLATE 2.5 MG/1
1 TABLET ORAL
Qty: 0 | Refills: 0 | COMMUNITY

## 2017-12-06 RX ORDER — OXYCODONE HYDROCHLORIDE 5 MG/1
1 TABLET ORAL
Qty: 0 | Refills: 0 | DISCHARGE
Start: 2017-12-06

## 2017-12-06 RX ORDER — ASPIRIN/CALCIUM CARB/MAGNESIUM 324 MG
1 TABLET ORAL
Qty: 0 | Refills: 0 | DISCHARGE
Start: 2017-12-06

## 2017-12-06 RX ORDER — ACETAMINOPHEN 500 MG
2 TABLET ORAL
Qty: 0 | Refills: 0 | DISCHARGE
Start: 2017-12-06

## 2017-12-06 RX ORDER — ASPIRIN/CALCIUM CARB/MAGNESIUM 324 MG
1 TABLET ORAL
Qty: 0 | Refills: 0 | COMMUNITY

## 2017-12-06 RX ORDER — AMIODARONE HYDROCHLORIDE 400 MG/1
1 TABLET ORAL
Qty: 0 | Refills: 0 | DISCHARGE
Start: 2017-12-06

## 2017-12-06 RX ORDER — BENAZEPRIL HYDROCHLORIDE 40 MG/1
1 TABLET ORAL
Qty: 0 | Refills: 0 | COMMUNITY

## 2017-12-06 RX ORDER — ATENOLOL 25 MG/1
1 TABLET ORAL
Qty: 0 | Refills: 0 | COMMUNITY

## 2017-12-06 RX ADMIN — Medication 100 MILLIGRAM(S): at 08:13

## 2017-12-06 RX ADMIN — AMIODARONE HYDROCHLORIDE 400 MILLIGRAM(S): 400 TABLET ORAL at 13:16

## 2017-12-06 RX ADMIN — Medication 100 MILLIGRAM(S): at 01:10

## 2017-12-06 RX ADMIN — Medication 1 TABLET(S): at 13:16

## 2017-12-06 RX ADMIN — Medication 81 MILLIGRAM(S): at 13:16

## 2017-12-06 RX ADMIN — AMIODARONE HYDROCHLORIDE 400 MILLIGRAM(S): 400 TABLET ORAL at 05:47

## 2017-12-06 RX ADMIN — SODIUM CHLORIDE 75 MILLILITER(S): 9 INJECTION, SOLUTION INTRAVENOUS at 11:47

## 2017-12-06 NOTE — DISCHARGE NOTE ADULT - MEDICATION SUMMARY - MEDICATIONS TO TAKE
I will START or STAY ON the medications listed below when I get home from the hospital:    acetaminophen 325 mg oral tablet  -- 2 tab(s) by mouth every 6 hours, As needed, mild, moderate pain  -- Indication: For Pain control    oxyCODONE 5 mg oral tablet  -- 1 tab(s) by mouth every 4 hours, As needed, moderate to severe pain  -- Indication: For Pain control    aspirin 81 mg oral tablet, chewable  -- 1 tab(s) by mouth once a day  -- Indication: For Heart protection    amiodarone 200 mg oral tablet  -- 1 tab(s) by mouth once a day  -- Indication: For Atrial Tachycardia    LORazepam 0.5 mg oral tablet  -- orally once a day  -- Indication: For Anxiety / Delusional disorder    atorvastatin 10 mg oral tablet  -- 1 tab(s) by mouth once a day  -- Indication: For Hyperlipidemia    RisperDAL 0.5 mg oral tablet  -- 1 tab(s) by mouth once a day (at bedtime)  -- Indication: For Delusional disorder    Multiple Vitamins oral capsule  -- 1 cap(s) by mouth once a day. last dose11/28/2017  -- Indication: For Supplement

## 2017-12-06 NOTE — PROGRESS NOTE ADULT - PROBLEM SELECTOR PLAN 4
work up negative  will monitor

## 2017-12-06 NOTE — DISCHARGE NOTE ADULT - CARE PLAN
Principal Discharge DX:	Other closed fracture of left patella, sequela  Goal:	Remain pain free  Instructions for follow-up, activity and diet:	You had surgery to remove part of your fractured patella. Please follow up with orthopedics, Dr. Valero information is provided below, please call for an appointment. Keep knee in extension with knee immobilizer.  Weight bearing as tolerated in [facility].  Secondary Diagnosis:	Hypertension, unspecified type  Instructions for follow-up, activity and diet:	Continue medications as currently prescribed. Follow up with your PMD for further management of disease. Dash diet.  Secondary Diagnosis:	Hyperlipidemia, unspecified hyperlipidemia type  Instructions for follow-up, activity and diet:	Continue current medications and low fat diet.  Secondary Diagnosis:	Atrial tachycardia  Instructions for follow-up, activity and diet:	Initially thought to be atrial fibrillation but after further investigation no evidence of atrial fibrillation or flutter were seen. Continue amiodarone to prevent further Atrial tachycardia.

## 2017-12-06 NOTE — PROGRESS NOTE ADULT - PROBLEM SELECTOR PROBLEM 2
R/O Other closed fracture of left patella, sequela

## 2017-12-06 NOTE — DISCHARGE NOTE ADULT - COMMUNITY RESOURCES
[facility], [address] [place] [address], [phone].   4:30pm  via [facility] ([phone]) TRIP# [id number]

## 2017-12-06 NOTE — DISCHARGE NOTE ADULT - PATIENT PORTAL LINK FT
“You can access the Follow[facility] Patient Portal, offered by [facility], by registering with the following website: [email or web address]”

## 2017-12-06 NOTE — PHYSICAL THERAPY INITIAL EVALUATION ADULT - PERTINENT HX OF CURRENT PROBLEM, REHAB EVAL
Tachycardia discovered during pre-op testing for Left Patellar Fracture Repair, which is now to be performed after patient is medically cleared.

## 2017-12-06 NOTE — DISCHARGE NOTE ADULT - MEDICATION SUMMARY - MEDICATIONS TO STOP TAKING
I will STOP taking the medications listed below when I get home from the hospital:    hydroCHLOROthiazide 12.5 mg oral capsule  -- 1 cap(s) by mouth once a day    amLODIPine 5 mg oral tablet  -- 1 tab(s) by mouth once a day    benazepril 10 mg oral tablet  -- 1 tab(s) by mouth once a day    atenolol 25 mg oral tablet  -- 1 tab(s) by mouth once a day    atenolol 25 mg oral tablet  -- 1 tab(s) by mouth 2 times a day

## 2017-12-06 NOTE — PROGRESS NOTE ADULT - SUBJECTIVE AND OBJECTIVE BOX
EP ATTENDING    tele: AT broke to NSR on 12/4    no palpitations, no syncope, no angina      MEDICATIONS  (STANDING):  amiodarone    Tablet 400 milliGRAM(s) Oral three times a day  aspirin  chewable 81 milliGRAM(s) Oral daily  atorvastatin 10 milliGRAM(s) Oral at bedtime  lactated ringers. 1000 milliLiter(s) (75 mL/Hr) IV Continuous <Continuous>  LORazepam     Tablet 0.5 milliGRAM(s) Oral at bedtime  melatonin 3 milliGRAM(s) Oral at bedtime  multivitamin 1 Tablet(s) Oral daily  risperiDONE   Tablet 0.5 milliGRAM(s) Oral at bedtime    LABS:                        8.8    8.96  )-----------( 183      ( 06 Dec 2017 06:50 )             27.4     138  |  100  |  21  ----------------------------<  132<H>  4.2   |  26  |  0.57    Ca    8.7      06 Dec 2017 06:50    PHYSICAL EXAM  Vital Signs Last 24 Hrs  T(C): 36.4 (06 Dec 2017 13:14), Max: 36.8 (05 Dec 2017 19:00)  T(F): 97.5 (06 Dec 2017 13:14), Max: 98.3 (05 Dec 2017 22:15)  HR: 66 (06 Dec 2017 13:14) (60 - 67)  BP: 136/68 (06 Dec 2017 13:14) (120/65 - 162/66)  RR: 18 (06 Dec 2017 13:14) (12 - 21)  SpO2: 96% (06 Dec 2017 13:14) (94% - 100%)      no JVD  RRR, no murmurs  CTAB  soft nt/nd  no c/c/e    echo normal      A/P) She is a pleasant 87 y/o female PMH hyperlipidemia and HTN who was found to be in SVT at preop testing prior to an elective left partial patellectomy for a patella fracture. She is asymptomatic denying palpitations nor syncope. Review of the EKG is consistent with an automatic focal low left atrial tachycardia (positive in V1, negative in II/F/III). There is no evidence of atrial fibrillation nor atrial flutter. Echo and TSH are normal. AT broke yesterday with several days of amio loading. s/p L knee partial patellectomy     -given no evidence of atrial fibrillation nor atrial flutter (yet) she is unlikely to require long term anticoagulation  -continue amiodarone 400mg po tid with food x 7 days, followed by 200mg daily (starting 12/7)  -no further inpatient EP workup needed EP ATTENDING    tele: AT broke to NSR on 12/4    no palpitations, no syncope, no angina      MEDICATIONS  (STANDING):  amiodarone    Tablet 400 milliGRAM(s) Oral three times a day  aspirin  chewable 81 milliGRAM(s) Oral daily  atorvastatin 10 milliGRAM(s) Oral at bedtime  lactated ringers. 1000 milliLiter(s) (75 mL/Hr) IV Continuous <Continuous>  LORazepam     Tablet 0.5 milliGRAM(s) Oral at bedtime  melatonin 3 milliGRAM(s) Oral at bedtime  multivitamin 1 Tablet(s) Oral daily  risperiDONE   Tablet 0.5 milliGRAM(s) Oral at bedtime    LABS:                        8.8    8.96  )-----------( 183      ( 06 Dec 2017 06:50 )             27.4     138  |  100  |  21  ----------------------------<  132<H>  4.2   |  26  |  0.57    Ca    8.7      06 Dec 2017 06:50    PHYSICAL EXAM  Vital Signs Last 24 Hrs  T(C): 36.4 (06 Dec 2017 13:14), Max: 36.8 (05 Dec 2017 19:00)  T(F): 97.5 (06 Dec 2017 13:14), Max: 98.3 (05 Dec 2017 22:15)  HR: 66 (06 Dec 2017 13:14) (60 - 67)  BP: 136/68 (06 Dec 2017 13:14) (120/65 - 162/66)  RR: 18 (06 Dec 2017 13:14) (12 - 21)  SpO2: 96% (06 Dec 2017 13:14) (94% - 100%)      no JVD  RRR, no murmurs  CTAB  soft nt/nd  no c/c/e    echo normal      A/P) She is a pleasant 89 y/o female PMH hyperlipidemia and HTN who was found to be in SVT at preop testing prior to an elective left partial patellectomy for a patella fracture. She is asymptomatic denying palpitations nor syncope. Review of the EKG is consistent with an automatic focal low left atrial tachycardia (positive in V1, negative in II/F/III). There is no evidence of atrial fibrillation nor atrial flutter. Echo and TSH are normal. AT broke with several days of amio loading. s/p L knee partial patellectomy     -given no evidence of atrial fibrillation nor atrial flutter (yet) she is unlikely to require long term anticoagulation  -continue amiodarone 400mg po tid with food x 7 days, followed by 200mg daily (starting 12/7)  -no further inpatient EP workup needed

## 2017-12-06 NOTE — PROGRESS NOTE ADULT - SUBJECTIVE AND OBJECTIVE BOX
ANESTHESIA POSTOP CHECK    88y Female POSTOP DAY 1 S/P left knee partial patellectomy    Vital Signs Last 24 Hrs  T(C): 36.8 (06 Dec 2017 05:45), Max: 36.8 (05 Dec 2017 19:00)  T(F): 98.2 (06 Dec 2017 05:45), Max: 98.3 (05 Dec 2017 22:15)  HR: 61 (06 Dec 2017 05:45) (60 - 67)  BP: 120/65 (06 Dec 2017 05:45) (120/65 - 162/66)  BP(mean): --  RR: 18 (06 Dec 2017 05:45) (12 - 21)  SpO2: 100% (06 Dec 2017 05:45) (94% - 100%)  I&O's Summary    05 Dec 2017 07:01  -  06 Dec 2017 07:00  --------------------------------------------------------  IN: 725 mL / OUT: 0 mL / NET: 725 mL        [ x] NO APPARENT ANESTHESIA COMPLICATIONS      Comments:

## 2017-12-06 NOTE — PROGRESS NOTE ADULT - SUBJECTIVE AND OBJECTIVE BOX
Patient is a 88y old  Female who presents with a chief complaint of fast heart rate (06 Dec 2017 14:21)      SUBJECTIVE / OVERNIGHT EVENTS: No nausea, vomiting or diarrhea, no fever or chills, no dizziness or chest pain, no dysuria or hematuria, no joint pain or swelling    MEDICATIONS  (STANDING):  amiodarone    Tablet 400 milliGRAM(s) Oral three times a day  aspirin  chewable 81 milliGRAM(s) Oral daily  atorvastatin 10 milliGRAM(s) Oral at bedtime  lactated ringers. 1000 milliLiter(s) (75 mL/Hr) IV Continuous <Continuous>  LORazepam     Tablet 0.5 milliGRAM(s) Oral at bedtime  melatonin 3 milliGRAM(s) Oral at bedtime  multivitamin 1 Tablet(s) Oral daily  risperiDONE   Tablet 0.5 milliGRAM(s) Oral at bedtime    MEDICATIONS  (PRN):  acetaminophen   Tablet. 650 milliGRAM(s) Oral every 6 hours PRN mild, moderate pain  oxyCODONE    IR 5 milliGRAM(s) Oral every 4 hours PRN moderate to severe pain        CAPILLARY BLOOD GLUCOSE        I&O's Summary    05 Dec 2017 07:01  -  06 Dec 2017 07:00  --------------------------------------------------------  IN: 725 mL / OUT: 0 mL / NET: 725 mL    PHYSICAL EXAM: vital signs as above  in no apparent distress asthenic  HEENT: LONI EOMI  Neck: Supple, no JVD, no thyromegaly  Lungs: no rhonchi, no wheeze, no crackles  CVS: S1 S2 no M/R/G  Abdomen: no tenderness, no organomegaly, BS present  Neuro: AO x 3 no focal weakness, no sensory abnormalities  Psych: appropriate affect  Skin: warm, dry  Ext: no cyanosis or clubbing, no edema left patellar tenderness  Msk: no joint swelling or deformities  Back: no CVA tenderness, no kyphosis/scoliosis     LABS:                        8.8    8.96  )-----------( 183      ( 06 Dec 2017 06:50 )             27.4     12-06    138  |  100  |  21  ----------------------------<  132<H>  4.2   |  26  |  0.57    Ca    8.7      06 Dec 2017 06:50                  Consultant(s) Notes Reviewed:      Care Discussed with Consultants/Other Providers:    Contact Number, Dr Gottlieb [phone]

## 2017-12-06 NOTE — PROGRESS NOTE ADULT - PROBLEM SELECTOR PLAN 1
acceptable for now  continue to monitor

## 2017-12-06 NOTE — DISCHARGE NOTE ADULT - HOSPITAL COURSE
87 y/o female, with a PmHx of Hyperlipidemia, HTN, cataracts, delusional disorder, presents to [facility] ED with tachycardia (150s) discovered during preop testing for a left patella fracture repair. Patient reports that on Tuesday 11/23 she fell twice on her left knee after tripping on a ledge on the sidewalk (mechanical fall). She was scheduled to have surgery Wednesday 11/29 but during the pre-op testing she became tachycardic and found to have new onset Afib w/RVR. She was given Esmolol 30mg iv x 3 doses with no relief so she was sent to the ED for further evaluation. While in the ED, she was found to have a sustained HR in the 140's and was given several doses of Diltiazem with minimal relief. Currently, she is on Diltiazem drip and a Heparin gtt. Patient denies ever feeling palpitations, chest pain or shortness of breath. She also denies nausea, vomiting, fever, chills, diaphoresis, headache, dizziness, weakness. Pt is being admitted to telemetry for Afib w/RVR and a plan to have her left Patellar fx repaired when medically cleared. (30 Nov 2017 09:34)    EKG: Afib w/RVR @ 146  CE x 2 neg                   Occult neg                 UA neg                  UCX neg  11/30 CTA Chest - no evidence of pulmonary embolism followed to the segmental pulmonary arterial branches  12/1 TTE EF 71%,  Mitral annular calcification, otherwise normal mitral valve. Mild mitral regurgitation. Calcified trileaflet aortic valve with normal opening. Normal left ventricular systolic function. No segmental wall motion abnormalities. Normal right ventricular size and function. Normal tricuspid valve. Mild tricuspid regurgitation.    11/29 ortho: plan to perform L partial patellectomy for patella fracture during this admission once optimized.  Plan to proceed at earliest tomorrow after 5pm if medically optimized.  11/30 cards: Admitted with tachycardia, found to have a left atrial tach, hold hep given no afib and decreasing h/h, check tte, aad per eps    11/30 EP get echo, continue heparin for now, this arrhythmia is not amenable to FLORIN-DCCV (it will just restart),d/c atenolol, d/c cardizem,start amiodarone 400mg po tid with food (7 days) followed by 200mg daily,if it doesn't respond to AAD therapy then I will discuss the role of ablation  12/1 ortho: Pain Control, Planning for OR tomorrow. f/u EP/cards recs regarding clearance for surgery. Heparin gtt, will need to be held tonight if patient is going to surgery tomorrow  12/1 EP :get echo, continue amiodarone 400mg po tid with food (day 2/7) followed by 200mg daily  12/1 Med: work up anemia   12/1 Cards: Cont Amio Load per EP, F/u 2 D echo, Anticipate she will revert back to NSR with amio load.for patellar surgery when optimized.   12/3 Ortho: 88y old Female with L patella fracture. Pain control/ Analgesia, DVT ppx, NPO for now, will still plan for ORIF of patella today if able to be medically optimized. If not, then will plan for ORIF whenever cleared.   12/3 Med: 87 y/o female presents to [facility] ED with tachycardia (150s) discovered during preop testing for a left patella fracture repair, being optimized currently by cardiology attending, Closed fracture of left patella, plan per ortho, will need operative intervention eventually.   Afib.  Plan: HR uncontrolled still but a little better, Anemia- work-up negative, will monitor.      12/5 EP: continue amiodarone 400mg po tid with food x 7 days, followed by 200mg daily, no further inpatient EP workup needed      12/5 s/p OR  for L partial patellectomy  for Left Patellar fracture repair

## 2017-12-06 NOTE — PROGRESS NOTE ADULT - ASSESSMENT
87 y/o female, with a PmHx of Hyperlipidemia, HTN, cataracts, delusional disorder, presents to [facility] ED with tachycardia (150s) discovered during preop testing for a left patella fracture repair, being optimized currently by cardiology attending
89 y/o female, with a PmHx of Hyperlipidemia, HTN, cataracts, delusional disorder, presents to [facility] ED with tachycardia (150s) discovered during preop testing for a left patella fracture repair, being optimized currently by cardiology attending
89 y/o female, with a PmHx of Hyperlipidemia, HTN, cataracts, delusional disorder, presents to [facility] ED with tachycardia (150s) discovered during preop testing for a left patella fracture repair, being optimized currently by cardiology attending
87 y/o female, with a PmHx of Hyperlipidemia, HTN, cataracts, delusional disorder, presents to [facility] ED with tachycardia (150s) discovered during preop testing for a left patella fracture repair, being optimized currently by cardiology attending
87 y/o female, with a PmHx of Hyperlipidemia, HTN, cataracts, delusional disorder, presents to [facility] ED with tachycardia (150s) discovered during preop testing for a left patella fracture repair, being optimized currently by cardiology attending

## 2017-12-06 NOTE — PROGRESS NOTE ADULT - ATTENDING COMMENTS
EP ATTENDING    Agree with above. Continue amio load, start metoprolol 50 bid, d/c hctz and lisinopril. No indication for A/C. No contraindication for patella surgery tomorrow with ortho.  Will follow.
Patient seen and examined.  Agree with above.   -No further cardiac workup needed at this time  -f/u yisel Ayala MD  [facility]  [address] [place] [address], [address]  [place] [address]  office: [phone]  pager: [phone]
EP ATTENDING    Agree with above. AT broke to sinus with amio. Continue amio load. D/C beta blockers.
EP ATTENDING    Agree with above. Continue amiodarone for AT. No indication for A/C given no afib nor AFL. No further inpatient EP workup needed.
discussed with patient in detail, all questions answered
discussed with patient in detail, all questions answered  likely eventual discharge to subacute rehab when cleared by all services

## 2017-12-06 NOTE — PROGRESS NOTE ADULT - PROVIDER SPECIALTY LIST ADULT
Anesthesia
Cardiology
Electrophysiology
Internal Medicine
Orthopedics
Internal Medicine
Internal Medicine

## 2017-12-06 NOTE — PROGRESS NOTE ADULT - SUBJECTIVE AND OBJECTIVE BOX
Orthopedic Surgery Progress Note  S: Pain is well controlled.  No nausea, vomiting, chest pain, shortness of breath, lightheadedness, or dizziness. Wearing BJKI, patient has not tried to get OOB yet    O:  Vital Signs Last 24 Hrs  T(C): 36.8 (06 Dec 2017 05:45), Max: 37.2 (05 Dec 2017 07:38)  T(F): 98.2 (06 Dec 2017 05:45), Max: 98.9 (05 Dec 2017 07:38)  HR: 61 (06 Dec 2017 05:45) (60 - 68)  BP: 120/65 (06 Dec 2017 05:45) (120/65 - 162/66)  RR: 18 (06 Dec 2017 05:45) (12 - 21)  SpO2: 100% (06 Dec 2017 05:45) (94% - 100%)    Gen: NAD  LLE  Dressing clean, dry, intact; BJKI in place  EHL/FHL/TA/GS intact  SILT DP/SP/Velez/Sa  WWP distally                       10.1   9.49  )-----------( 206      ( 05 Dec 2017 18:45 )             31.9                         9.3    10.24 )-----------( 194      ( 05 Dec 2017 06:40 )             29.0     12-05    140  |  101  |  20  ----------------------------<  118<H>  4.2   |  26  |  0.57      A/P 88y year old Female POD#1 s/p L partial patellectomy    Pain Control  WBAT in BJKI; patient to be brought evaristo today, keep locked in extension, WBAT  PT/OOB  I/S  DVT PPx: ASA  Home meds  Acute care per primary team  Dispo Planning: f/u PT/OT recs    Devon Barragan MD Orthopedic Surgery Progress Note  S: Pain is well controlled.  No nausea, vomiting, chest pain, shortness of breath, lightheadedness, or dizziness. Wearing BJKI, patient has not tried to get OOB yet    O:  Vital Signs Last 24 Hrs  T(C): 36.8 (06 Dec 2017 05:45), Max: 37.2 (05 Dec 2017 07:38)  T(F): 98.2 (06 Dec 2017 05:45), Max: 98.9 (05 Dec 2017 07:38)  HR: 61 (06 Dec 2017 05:45) (60 - 68)  BP: 120/65 (06 Dec 2017 05:45) (120/65 - 162/66)  RR: 18 (06 Dec 2017 05:45) (12 - 21)  SpO2: 100% (06 Dec 2017 05:45) (94% - 100%)    Gen: NAD  LLE  Dressing clean, dry, intact; BJKI in place  EHL/FHL/TA/GS intact  SILT DP/SP/Velez/Sa  WWP distally                       10.1   9.49  )-----------( 206      ( 05 Dec 2017 18:45 )             31.9                         9.3    10.24 )-----------( 194      ( 05 Dec 2017 06:40 )             29.0     12-05    140  |  101  |  20  ----------------------------<  118<H>  4.2   |  26  |  0.57      A/P 88y year old Female POD#1 s/p L partial patellectomy    Pain Control  WBAT in BJKI; patient to be brought evaristo today, keep locked in extension, WBAT  PT/OOB  I/S  DVT PPx: ASA  Home meds  Acute care per primary team  Dispo Planning: f/u PT/OT recs  Ortho stable for discharge    Devon Barragan MD

## 2017-12-06 NOTE — PROGRESS NOTE ADULT - SUBJECTIVE AND OBJECTIVE BOX
Subjective: No CP or SOB, pt. examined this am       acetaminophen   Tablet. 650 milliGRAM(s) Oral every 6 hours PRN  amiodarone    Tablet 400 milliGRAM(s) Oral three times a day  aspirin  chewable 81 milliGRAM(s) Oral daily  atorvastatin 10 milliGRAM(s) Oral at bedtime  lactated ringers. 1000 milliLiter(s) IV Continuous <Continuous>  LORazepam     Tablet 0.5 milliGRAM(s) Oral at bedtime  melatonin 3 milliGRAM(s) Oral at bedtime  multivitamin 1 Tablet(s) Oral daily  oxyCODONE    IR 5 milliGRAM(s) Oral every 4 hours PRN  risperiDONE   Tablet 0.5 milliGRAM(s) Oral at bedtime                            8.8    8.96  )-----------( 183      ( 06 Dec 2017 06:50 )             27.4       12-06    138  |  100  |  21  ----------------------------<  132<H>  4.2   |  26  |  0.57    Ca    8.7      06 Dec 2017 06:50              T(C): 36.4 (12-06-17 @ 13:14), Max: 36.8 (12-05-17 @ 19:00)  HR: 66 (12-06-17 @ 13:14) (60 - 67)  BP: 136/68 (12-06-17 @ 13:14) (120/65 - 162/66)  RR: 18 (12-06-17 @ 13:14) (12 - 21)  SpO2: 96% (12-06-17 @ 13:14) (94% - 100%)  Wt(kg): --    I&O's Summary    05 Dec 2017 07:01  -  06 Dec 2017 07:00  --------------------------------------------------------  IN: 725 mL / OUT: 0 mL / NET: 725 mL        HEENT:   Normal oral mucosa, PERRL, EOMI	  Lymphatic: No obvious lymphadenopathy , no edema  Cardiovascular: Normal S1 S2, No JVD, 1/6 RUPERT murmur, Peripheral pulses palpable 2+ bilaterally  Respiratory: Lungs clear to auscultation, normal effort 	  Gastrointestinal:  Soft, Non-tender, + BS	  Skin: No rashes,  No cyanosis, warm to touch      TELEMETRY:  AT-->SR    < from: Transthoracic Echocardiogram (12.01.17 @ 15:09) >  CONCLUSIONS:  1. Mitral annular calcification, otherwise normal mitral  valve. Mild mitral regurgitation.  2. Calcified trileaflet aortic valve with normal opening.  3. Normal left ventricular systolic function. No segmental  wall motion abnormalities.  4. Normal right ventricular size and function.  5. Normal tricuspid valve. Mild tricuspid regurgitation.  ------------------------------------------------------------------------  Confirmed on  12/1/2017 - 17:05:09 by Andria Mendoza M.D. RPVI    < end of copied text >    ASSESSMENT/PLAN: 	88y Female hyperlipidemia and HTN who was found to be in Atrial Tachycardia at preop testing prior to an elective left partial patellectomy for a patella fracture.     - Cont Amio per EP, remains in SR  -  TTE with normal LV function  - s/p partial pattelectomy  - dc planning to rehab    Taryn Ayala MD  [facility]  [address] [place] [address], [address]  [place] [address]  office: [phone]  pager: [phone]

## 2017-12-06 NOTE — PROGRESS NOTE ADULT - PROBLEM SELECTOR PLAN 5
outpatient follow up with PCP

## 2017-12-06 NOTE — PROGRESS NOTE ADULT - SUBJECTIVE AND OBJECTIVE BOX
POD1  s/p left knee partial patellectomy  A&O x 3, resting in bed  Left knee  - immobilizer in place.  DNVI left foot and ankle.  Ortho stable, OOB with PT, assistance. WBAT left lower extremity with walker.  Discharge planning.  Will need extension bracing of left knee for 6 weeks.

## 2017-12-06 NOTE — DISCHARGE NOTE ADULT - CARE PROVIDER_API CALL
Taryn Ayala), Cardiovascular Disease; Internal Medicine; Interventional Cardiology  [address] [facility] [address]  [place] [address]  Phone: [phone]  Fax: [phone]    Davey Caban), Cardiac Electrophysiology; Cardiovascular Disease; Internal Medicine; Nuclear Cardiology  [address] [facility]  [address]  [place] [address]  Phone: [phone]  Fax: [phone]    Lawrence Valero), Orthopaedic Surgery  [address] [facility]  [address]  [place] [address]  Phone: [phone]  Fax: [phone]

## 2017-12-06 NOTE — DISCHARGE NOTE ADULT - PLAN OF CARE
Remain pain free You had surgery to remove part of your fractured patella. Please follow up with orthopedics, Dr. Valero information is provided below, please call for an appointment. Keep knee in extension with knee immobilizer.  Weight bearing as tolerated in BJKL. Continue medications as currently prescribed. Follow up with your PMD for further management of disease. Dash diet. Continue current medications and low fat diet. Initially thought to be atrial fibrillation but after further investigation no evidence of atrial fibrillation or flutter were seen. Continue amiodarone to prevent further Atrial tachycardia.

## 2017-12-06 NOTE — PROGRESS NOTE ADULT - PROBLEM SELECTOR PLAN 3
HR better  resume anticoagulation per cardiology attending
HR much better controlled  will defer to EP and cardiology attending
HR much better controlled  will defer to EP and cardiology attending  anticoagulation on hold secondary to OR
HR uncontrolled still but a little betetr  will defer to EP and cardiology attending
HR uncontrolled still  will defer to EP

## 2017-12-06 NOTE — PROGRESS NOTE ADULT - PROBLEM SELECTOR PLAN 2
for OR today  will defer management to ortho
plan per ortho  will need operative intervention eventually
plan per ortho  will need operative intervention eventually  OR tomorrow at 4 pm tomorrow
plan per ortho  will need operative intervention
s/p hemipatellectomy  defer to ortho service

## 2017-12-06 NOTE — DISCHARGE NOTE ADULT - CARE PROVIDERS DIRECT ADDRESSES
,DirectAddress_Unknown,DirectAddress_Unknown,[email or web address]

## 2017-12-08 ENCOUNTER — APPOINTMENT (OUTPATIENT)
Dept: ORTHOPEDIC SURGERY | Facility: CLINIC | Age: 82
End: 2017-12-08

## 2017-12-19 ENCOUNTER — APPOINTMENT (OUTPATIENT)
Dept: INTERNAL MEDICINE | Facility: CLINIC | Age: 82
End: 2017-12-19

## 2017-12-21 ENCOUNTER — APPOINTMENT (OUTPATIENT)
Dept: INTERNAL MEDICINE | Facility: CLINIC | Age: 82
End: 2017-12-21

## 2018-01-01 NOTE — ED PROVIDER NOTE - MUSCULOSKELETAL, MLM
2018 22:33
Spine appears normal, no midline spinal tenderness, full rom of neck upper extremities and right lower extremity, limited rom of left knee secondary to swelling and pain.

## 2018-02-02 ENCOUNTER — LABORATORY RESULT (OUTPATIENT)
Age: 83
End: 2018-02-02

## 2018-02-02 ENCOUNTER — NON-APPOINTMENT (OUTPATIENT)
Age: 83
End: 2018-02-02

## 2018-02-02 ENCOUNTER — APPOINTMENT (OUTPATIENT)
Dept: INTERNAL MEDICINE | Facility: CLINIC | Age: 83
End: 2018-02-02
Payer: MEDICARE

## 2018-02-02 VITALS — BODY MASS INDEX: 20.39 KG/M2 | TEMPERATURE: 97.7 F | WEIGHT: 108 LBS | HEIGHT: 61 IN

## 2018-02-02 VITALS — DIASTOLIC BLOOD PRESSURE: 80 MMHG | SYSTOLIC BLOOD PRESSURE: 160 MMHG

## 2018-02-02 PROCEDURE — 99215 OFFICE O/P EST HI 40 MIN: CPT | Mod: 25

## 2018-02-02 PROCEDURE — 93000 ELECTROCARDIOGRAM COMPLETE: CPT

## 2018-02-02 PROCEDURE — 36415 COLL VENOUS BLD VENIPUNCTURE: CPT

## 2018-02-02 RX ORDER — AMLODIPINE BESYLATE 5 MG/1
5 TABLET ORAL
Qty: 7 | Refills: 0 | Status: DISCONTINUED | COMMUNITY
Start: 2017-11-16 | End: 2018-02-02

## 2018-02-02 RX ORDER — ATENOLOL 25 MG/1
25 TABLET ORAL
Qty: 60 | Refills: 3 | Status: DISCONTINUED | COMMUNITY
Start: 2017-10-16 | End: 2018-02-02

## 2018-02-03 LAB
25(OH)D3 SERPL-MCNC: 29.6 NG/ML
ALBUMIN SERPL ELPH-MCNC: 3.9 G/DL
ALP BLD-CCNC: 68 U/L
ALT SERPL-CCNC: 7 U/L
ANION GAP SERPL CALC-SCNC: 16 MMOL/L
AST SERPL-CCNC: 16 U/L
BASOPHILS # BLD AUTO: 0.02 K/UL
BASOPHILS NFR BLD AUTO: 0.3 %
BILIRUB SERPL-MCNC: 0.4 MG/DL
BUN SERPL-MCNC: 17 MG/DL
CALCIUM SERPL-MCNC: 10.4 MG/DL
CHLORIDE SERPL-SCNC: 100 MMOL/L
CHOLEST SERPL-MCNC: 185 MG/DL
CHOLEST/HDLC SERPL: 2.1 RATIO
CO2 SERPL-SCNC: 26 MMOL/L
CREAT SERPL-MCNC: 0.63 MG/DL
EOSINOPHIL # BLD AUTO: 0.03 K/UL
EOSINOPHIL NFR BLD AUTO: 0.5 %
GLUCOSE SERPL-MCNC: 119 MG/DL
HBA1C MFR BLD HPLC: 5.3 %
HCT VFR BLD CALC: 38.4 %
HDLC SERPL-MCNC: 89 MG/DL
HGB BLD-MCNC: 11.7 G/DL
IMM GRANULOCYTES NFR BLD AUTO: 0.2 %
LDLC SERPL CALC-MCNC: 86 MG/DL
LYMPHOCYTES # BLD AUTO: 1.25 K/UL
LYMPHOCYTES NFR BLD AUTO: 20.3 %
MAN DIFF?: NORMAL
MCHC RBC-ENTMCNC: 30.5 GM/DL
MCHC RBC-ENTMCNC: 30.9 PG
MCV RBC AUTO: 101.3 FL
MONOCYTES # BLD AUTO: 0.61 K/UL
MONOCYTES NFR BLD AUTO: 9.9 %
NEUTROPHILS # BLD AUTO: 4.24 K/UL
NEUTROPHILS NFR BLD AUTO: 68.8 %
PLATELET # BLD AUTO: 223 K/UL
POTASSIUM SERPL-SCNC: 4.4 MMOL/L
PROT SERPL-MCNC: 7.4 G/DL
RBC # BLD: 3.79 M/UL
RBC # FLD: 14.9 %
SAVE SPECIMEN: NORMAL
SODIUM SERPL-SCNC: 142 MMOL/L
T3RU NFR SERPL: 0.97 INDEX
TRIGL SERPL-MCNC: 49 MG/DL
TSH SERPL-ACNC: 1.58 UIU/ML
URATE SERPL-MCNC: 2.6 MG/DL
WBC # FLD AUTO: 6.16 K/UL

## 2018-02-07 ENCOUNTER — APPOINTMENT (OUTPATIENT)
Dept: ORTHOPEDIC SURGERY | Facility: CLINIC | Age: 83
End: 2018-02-07
Payer: MEDICARE

## 2018-02-07 PROCEDURE — 99024 POSTOP FOLLOW-UP VISIT: CPT

## 2018-02-09 ENCOUNTER — APPOINTMENT (OUTPATIENT)
Dept: INTERNAL MEDICINE | Facility: CLINIC | Age: 83
End: 2018-02-09
Payer: MEDICARE

## 2018-02-09 VITALS — DIASTOLIC BLOOD PRESSURE: 70 MMHG | SYSTOLIC BLOOD PRESSURE: 140 MMHG

## 2018-02-09 VITALS — HEIGHT: 61 IN | BODY MASS INDEX: 20.39 KG/M2 | WEIGHT: 108 LBS

## 2018-02-09 PROCEDURE — 36415 COLL VENOUS BLD VENIPUNCTURE: CPT

## 2018-02-09 PROCEDURE — 99214 OFFICE O/P EST MOD 30 MIN: CPT | Mod: 25

## 2018-02-23 ENCOUNTER — MEDICATION RENEWAL (OUTPATIENT)
Age: 83
End: 2018-02-23

## 2018-02-23 ENCOUNTER — RX RENEWAL (OUTPATIENT)
Age: 83
End: 2018-02-23

## 2018-03-02 ENCOUNTER — MEDICATION RENEWAL (OUTPATIENT)
Age: 83
End: 2018-03-02

## 2018-03-14 ENCOUNTER — APPOINTMENT (OUTPATIENT)
Dept: ORTHOPEDIC SURGERY | Facility: CLINIC | Age: 83
End: 2018-03-14
Payer: MEDICARE

## 2018-03-14 PROCEDURE — 73560 X-RAY EXAM OF KNEE 1 OR 2: CPT | Mod: LT

## 2018-03-14 PROCEDURE — 99213 OFFICE O/P EST LOW 20 MIN: CPT

## 2018-04-06 ENCOUNTER — MEDICATION RENEWAL (OUTPATIENT)
Age: 83
End: 2018-04-06

## 2018-04-13 ENCOUNTER — LABORATORY RESULT (OUTPATIENT)
Age: 83
End: 2018-04-13

## 2018-04-13 ENCOUNTER — APPOINTMENT (OUTPATIENT)
Dept: INTERNAL MEDICINE | Facility: CLINIC | Age: 83
End: 2018-04-13
Payer: MEDICARE

## 2018-04-13 VITALS — WEIGHT: 109 LBS | BODY MASS INDEX: 20.58 KG/M2 | HEIGHT: 61 IN

## 2018-04-13 LAB
BILIRUB UR QL STRIP: NORMAL
CLARITY UR: CLEAR
COLLECTION METHOD: NORMAL
GLUCOSE UR-MCNC: NORMAL
HCG UR QL: 0.2 EU/DL
HGB UR QL STRIP.AUTO: NORMAL
KETONES UR-MCNC: NORMAL
LEUKOCYTE ESTERASE UR QL STRIP: NORMAL
NITRITE UR QL STRIP: NORMAL
PH UR STRIP: 6.5
PROT UR STRIP-MCNC: NORMAL
SP GR UR STRIP: 1.01

## 2018-04-13 PROCEDURE — 36415 COLL VENOUS BLD VENIPUNCTURE: CPT

## 2018-04-13 PROCEDURE — 81003 URINALYSIS AUTO W/O SCOPE: CPT | Mod: QW

## 2018-04-13 PROCEDURE — 99214 OFFICE O/P EST MOD 30 MIN: CPT | Mod: 25

## 2018-04-13 PROCEDURE — G0009: CPT

## 2018-04-13 PROCEDURE — 90732 PPSV23 VACC 2 YRS+ SUBQ/IM: CPT

## 2018-04-13 RX ORDER — LORAZEPAM 0.5 MG/1
0.5 TABLET ORAL TWICE DAILY
Qty: 60 | Refills: 0 | Status: DISCONTINUED | COMMUNITY
Start: 2018-02-23 | End: 2018-04-13

## 2018-04-14 LAB
25(OH)D3 SERPL-MCNC: 36.5 NG/ML
ALBUMIN SERPL ELPH-MCNC: 4.4 G/DL
ALP BLD-CCNC: 78 U/L
ALT SERPL-CCNC: 9 U/L
ANION GAP SERPL CALC-SCNC: 14 MMOL/L
APPEARANCE: CLEAR
AST SERPL-CCNC: 18 U/L
BACTERIA: NEGATIVE
BASOPHILS # BLD AUTO: 0.01 K/UL
BASOPHILS NFR BLD AUTO: 0.2 %
BILIRUB SERPL-MCNC: 0.5 MG/DL
BILIRUBIN URINE: NEGATIVE
BLOOD URINE: NEGATIVE
BUN SERPL-MCNC: 17 MG/DL
CALCIUM SERPL-MCNC: 10 MG/DL
CHLORIDE SERPL-SCNC: 102 MMOL/L
CHOLEST SERPL-MCNC: 170 MG/DL
CHOLEST/HDLC SERPL: 2.1 RATIO
CO2 SERPL-SCNC: 27 MMOL/L
COLOR: YELLOW
CREAT SERPL-MCNC: 0.66 MG/DL
EOSINOPHIL # BLD AUTO: 0.04 K/UL
EOSINOPHIL NFR BLD AUTO: 0.7 %
GLUCOSE QUALITATIVE U: NEGATIVE MG/DL
GLUCOSE SERPL-MCNC: 91 MG/DL
HBA1C MFR BLD HPLC: 5.5 %
HCT VFR BLD CALC: 37.7 %
HDLC SERPL-MCNC: 80 MG/DL
HGB BLD-MCNC: 12 G/DL
HYALINE CASTS: 3 /LPF
IMM GRANULOCYTES NFR BLD AUTO: 0.2 %
KETONES URINE: ABNORMAL
LDLC SERPL CALC-MCNC: 81 MG/DL
LEUKOCYTE ESTERASE URINE: NEGATIVE
LYMPHOCYTES # BLD AUTO: 1.95 K/UL
LYMPHOCYTES NFR BLD AUTO: 34.2 %
MAN DIFF?: NORMAL
MCHC RBC-ENTMCNC: 31.5 PG
MCHC RBC-ENTMCNC: 31.8 GM/DL
MCV RBC AUTO: 99 FL
MICROSCOPIC-UA: NORMAL
MONOCYTES # BLD AUTO: 0.45 K/UL
MONOCYTES NFR BLD AUTO: 7.9 %
NEUTROPHILS # BLD AUTO: 3.25 K/UL
NEUTROPHILS NFR BLD AUTO: 56.8 %
NITRITE URINE: NEGATIVE
PH URINE: 6.5
PLATELET # BLD AUTO: 177 K/UL
POTASSIUM SERPL-SCNC: 4 MMOL/L
PROT SERPL-MCNC: 8 G/DL
PROTEIN URINE: NEGATIVE MG/DL
RBC # BLD: 3.81 M/UL
RBC # FLD: 14.8 %
RED BLOOD CELLS URINE: 7 /HPF
SAVE SPECIMEN: NORMAL
SODIUM SERPL-SCNC: 143 MMOL/L
SPECIFIC GRAVITY URINE: 1.02
SQUAMOUS EPITHELIAL CELLS: 0 /HPF
T3RU NFR SERPL: 0.96 INDEX
T4 SERPL-MCNC: 8.4 UG/DL
TRIGL SERPL-MCNC: 47 MG/DL
TSH SERPL-ACNC: 0.27 UIU/ML
URATE SERPL-MCNC: 3.6 MG/DL
UROBILINOGEN URINE: NEGATIVE MG/DL
WBC # FLD AUTO: 5.71 K/UL
WHITE BLOOD CELLS URINE: 2 /HPF

## 2018-04-15 LAB — BACTERIA UR CULT: NORMAL

## 2018-04-20 ENCOUNTER — RX RENEWAL (OUTPATIENT)
Age: 83
End: 2018-04-20

## 2018-04-25 ENCOUNTER — APPOINTMENT (OUTPATIENT)
Dept: ORTHOPEDIC SURGERY | Facility: CLINIC | Age: 83
End: 2018-04-25
Payer: MEDICARE

## 2018-04-25 PROCEDURE — 99213 OFFICE O/P EST LOW 20 MIN: CPT

## 2018-05-11 ENCOUNTER — MEDICATION RENEWAL (OUTPATIENT)
Age: 83
End: 2018-05-11

## 2018-05-18 ENCOUNTER — APPOINTMENT (OUTPATIENT)
Dept: INTERNAL MEDICINE | Facility: CLINIC | Age: 83
End: 2018-05-18

## 2018-05-29 ENCOUNTER — RX RENEWAL (OUTPATIENT)
Age: 83
End: 2018-05-29

## 2018-06-18 ENCOUNTER — RX RENEWAL (OUTPATIENT)
Age: 83
End: 2018-06-18

## 2018-06-19 ENCOUNTER — APPOINTMENT (OUTPATIENT)
Dept: ORTHOPEDIC SURGERY | Facility: CLINIC | Age: 83
End: 2018-06-19
Payer: MEDICARE

## 2018-06-19 PROCEDURE — 99213 OFFICE O/P EST LOW 20 MIN: CPT

## 2018-06-21 ENCOUNTER — RX RENEWAL (OUTPATIENT)
Age: 83
End: 2018-06-21

## 2018-06-21 ENCOUNTER — MEDICATION RENEWAL (OUTPATIENT)
Age: 83
End: 2018-06-21

## 2018-07-03 ENCOUNTER — LABORATORY RESULT (OUTPATIENT)
Age: 83
End: 2018-07-03

## 2018-07-03 ENCOUNTER — APPOINTMENT (OUTPATIENT)
Dept: INTERNAL MEDICINE | Facility: CLINIC | Age: 83
End: 2018-07-03
Payer: MEDICARE

## 2018-07-03 ENCOUNTER — NON-APPOINTMENT (OUTPATIENT)
Age: 83
End: 2018-07-03

## 2018-07-03 VITALS — BODY MASS INDEX: 20.87 KG/M2 | WEIGHT: 112 LBS | HEIGHT: 61.5 IN

## 2018-07-03 VITALS — DIASTOLIC BLOOD PRESSURE: 70 MMHG | SYSTOLIC BLOOD PRESSURE: 140 MMHG

## 2018-07-03 DIAGNOSIS — M54.30 SCIATICA, UNSPECIFIED SIDE: ICD-10-CM

## 2018-07-03 PROCEDURE — 36415 COLL VENOUS BLD VENIPUNCTURE: CPT

## 2018-07-03 PROCEDURE — 93000 ELECTROCARDIOGRAM COMPLETE: CPT

## 2018-07-03 PROCEDURE — 99214 OFFICE O/P EST MOD 30 MIN: CPT | Mod: 25

## 2018-07-03 RX ORDER — ENOXAPARIN SODIUM 100 MG/ML
40 INJECTION SUBCUTANEOUS
Qty: 28 | Refills: 0 | Status: DISCONTINUED | COMMUNITY
Start: 2018-01-22 | End: 2018-07-03

## 2018-07-03 RX ORDER — LORAZEPAM 0.5 MG/1
0.5 TABLET ORAL
Refills: 0 | Status: DISCONTINUED | COMMUNITY
Start: 2017-11-28 | End: 2018-07-03

## 2018-07-03 RX ORDER — ATORVASTATIN CALCIUM 10 MG/1
10 TABLET, FILM COATED ORAL
Qty: 7 | Refills: 0 | Status: DISCONTINUED | COMMUNITY
Start: 2017-11-16 | End: 2018-07-03

## 2018-07-03 RX ORDER — SENNOSIDES 8.6 MG TABLETS 8.6 MG/1
8.6 TABLET ORAL
Qty: 60 | Refills: 0 | Status: ACTIVE | COMMUNITY
Start: 2018-01-19

## 2018-07-03 NOTE — DISCUSSION/SUMMARY
[FreeTextEntry1] : Patient c/o mild depression . Told this may be related to her beta blocker . Advised to come to the office

## 2018-07-03 NOTE — ASSESSMENT
[FreeTextEntry1] : This is a patient with a history of hypertension, prediabetes, hypercholesterolemia, sciatica Dang today for followup visit. Her physical examination is normal. Her urinalysis is negative.  [Normal Weight (BMI <25)] : normal weight - BMI <25 [Resistance training 2 days per week recommended] : Resistance training 2 days per week recommended [Mediterranean diet recommended] : Mediterranean diet recommended [Non - Smoker] : non-smoker

## 2018-07-03 NOTE — PHYSICAL EXAM
[General Appearance - Alert] : alert [General Appearance - In No Acute Distress] : in no acute distress [Sclera] : the sclera and conjunctiva were normal [PERRL With Normal Accommodation] : pupils were equal in size, round, and reactive to light [Extraocular Movements] : extraocular movements were intact [Outer Ear] : the ears and nose were normal in appearance [Oropharynx] : the oropharynx was normal [Neck Appearance] : the appearance of the neck was normal [Neck Cervical Mass (___cm)] : no neck mass was observed [Jugular Venous Distention Increased] : there was no jugular-venous distention [Thyroid Diffuse Enlargement] : the thyroid was not enlarged [Thyroid Nodule] : there were no palpable thyroid nodules [Auscultation Breath Sounds / Voice Sounds] : lungs were clear to auscultation bilaterally [Heart Rate And Rhythm] : heart rate was normal and rhythm regular [Heart Sounds] : normal S1 and S2 [Heart Sounds Gallop] : no gallops [Murmurs] : no murmurs [Heart Sounds Pericardial Friction Rub] : no pericardial rub [Full Pulse] : the pedal pulses are present [Edema] : there was no peripheral edema [Breast Appearance] : normal in appearance [Breast Palpation Mass] : no palpable masses [Bowel Sounds] : normal bowel sounds [Abdomen Soft] : soft [Abdomen Tenderness] : non-tender [] : no hepato-splenomegaly [Abdomen Mass (___ Cm)] : no abdominal mass palpated [FreeTextEntry1] : Hemoccult negative [Cervical Lymph Nodes Enlarged Posterior Bilaterally] : posterior cervical [Cervical Lymph Nodes Enlarged Anterior Bilaterally] : anterior cervical [Supraclavicular Lymph Nodes Enlarged Bilaterally] : supraclavicular [Axillary Lymph Nodes Enlarged Bilaterally] : axillary [Femoral Lymph Nodes Enlarged Bilaterally] : femoral [Inguinal Lymph Nodes Enlarged Bilaterally] : inguinal [No CVA Tenderness] : no ~M costovertebral angle tenderness [No Spinal Tenderness] : no spinal tenderness [Abnormal Walk] : normal gait [Nail Clubbing] : no clubbing  or cyanosis of the fingernails [Musculoskeletal - Swelling] : no joint swelling seen [Motor Tone] : muscle strength and tone were normal [Deep Tendon Reflexes (DTR)] : deep tendon reflexes were 2+ and symmetric [Sensation] : the sensory exam was normal to light touch and pinprick [No Focal Deficits] : no focal deficits [Oriented To Time, Place, And Person] : oriented to person, place, and time [Impaired Insight] : insight and judgment were intact [Affect] : the affect was normal [Over the Past 2 Weeks, Have You Felt Down, Depressed, or Hopeless?] : 1.) Over the past 2 weeks, have you felt down, depressed, or hopeless? No [Over the Past 2 Weeks, Have You Felt Little Interest or Pleasure Doing Things?] : 2.) Over the past 2 weeks, have you felt little interest or pleasure doing things? No

## 2018-07-04 LAB
25(OH)D3 SERPL-MCNC: 33.3 NG/ML
ALBUMIN SERPL ELPH-MCNC: 4.2 G/DL
ALP BLD-CCNC: 70 U/L
ALT SERPL-CCNC: 8 U/L
ANION GAP SERPL CALC-SCNC: 14 MMOL/L
AST SERPL-CCNC: 16 U/L
BILIRUB SERPL-MCNC: 0.6 MG/DL
BUN SERPL-MCNC: 21 MG/DL
CALCIUM SERPL-MCNC: 9.6 MG/DL
CHLORIDE SERPL-SCNC: 106 MMOL/L
CHOLEST SERPL-MCNC: 173 MG/DL
CHOLEST/HDLC SERPL: 1.8 RATIO
CO2 SERPL-SCNC: 23 MMOL/L
CREAT SERPL-MCNC: 0.67 MG/DL
GLUCOSE SERPL-MCNC: 104 MG/DL
HBA1C MFR BLD HPLC: 5.5 %
HDLC SERPL-MCNC: 94 MG/DL
LDLC SERPL CALC-MCNC: 71 MG/DL
POTASSIUM SERPL-SCNC: 4 MMOL/L
PROT SERPL-MCNC: 7.7 G/DL
SAVE SPECIMEN: NORMAL
SODIUM SERPL-SCNC: 143 MMOL/L
T3RU NFR SERPL: 1.06 INDEX
T4 SERPL-MCNC: 6.4 UG/DL
TRIGL SERPL-MCNC: 39 MG/DL
TSH SERPL-ACNC: 0.96 UIU/ML
URATE SERPL-MCNC: 3.4 MG/DL

## 2018-07-20 ENCOUNTER — RX RENEWAL (OUTPATIENT)
Age: 83
End: 2018-07-20

## 2018-08-28 ENCOUNTER — RX RENEWAL (OUTPATIENT)
Age: 83
End: 2018-08-28

## 2018-09-04 ENCOUNTER — MEDICATION RENEWAL (OUTPATIENT)
Age: 83
End: 2018-09-04

## 2018-10-16 ENCOUNTER — RX RENEWAL (OUTPATIENT)
Age: 83
End: 2018-10-16

## 2018-10-29 ENCOUNTER — NON-APPOINTMENT (OUTPATIENT)
Age: 83
End: 2018-10-29

## 2018-10-29 ENCOUNTER — APPOINTMENT (OUTPATIENT)
Dept: INTERNAL MEDICINE | Facility: CLINIC | Age: 83
End: 2018-10-29
Payer: MEDICARE

## 2018-10-29 ENCOUNTER — LABORATORY RESULT (OUTPATIENT)
Age: 83
End: 2018-10-29

## 2018-10-29 VITALS — HEIGHT: 61.5 IN | BODY MASS INDEX: 21.43 KG/M2 | WEIGHT: 115 LBS

## 2018-10-29 VITALS — SYSTOLIC BLOOD PRESSURE: 140 MMHG | DIASTOLIC BLOOD PRESSURE: 70 MMHG

## 2018-10-29 PROBLEM — F22 DELUSIONAL DISORDERS: Chronic | Status: ACTIVE | Noted: 2017-11-28

## 2018-10-29 PROBLEM — H26.9 UNSPECIFIED CATARACT: Chronic | Status: ACTIVE | Noted: 2017-11-28

## 2018-10-29 LAB
BILIRUB UR QL STRIP: NORMAL
CLARITY UR: CLEAR
COLLECTION METHOD: NORMAL
GLUCOSE UR-MCNC: NORMAL
HCG UR QL: 0.2 EU/DL
HGB UR QL STRIP.AUTO: NORMAL
KETONES UR-MCNC: NORMAL
LEUKOCYTE ESTERASE UR QL STRIP: NORMAL
NITRITE UR QL STRIP: NORMAL
PH UR STRIP: 5.5
PROT UR STRIP-MCNC: NORMAL
SP GR UR STRIP: 1

## 2018-10-29 PROCEDURE — 81003 URINALYSIS AUTO W/O SCOPE: CPT | Mod: QW

## 2018-10-29 PROCEDURE — G0008: CPT

## 2018-10-29 PROCEDURE — 99214 OFFICE O/P EST MOD 30 MIN: CPT | Mod: 25

## 2018-10-29 PROCEDURE — 90686 IIV4 VACC NO PRSV 0.5 ML IM: CPT

## 2018-10-29 PROCEDURE — 93000 ELECTROCARDIOGRAM COMPLETE: CPT

## 2018-10-29 PROCEDURE — 36415 COLL VENOUS BLD VENIPUNCTURE: CPT

## 2018-10-29 RX ORDER — GLUCOSAMINE HCL/CHONDROITIN SU 500-400 MG
3 CAPSULE ORAL
Qty: 30 | Refills: 0 | Status: ACTIVE | COMMUNITY
Start: 2018-01-19

## 2018-10-29 NOTE — HISTORY OF PRESENT ILLNESS
[de-identified] : This a patient with a history of mild dementia, hypertension, hypercholesterolemia, mood disorder who states that for a followup visit

## 2018-10-29 NOTE — ASSESSMENT
[FreeTextEntry1] : Her vital signs were stable the physical examination is normal except for a grade 1/6 systolic ejection murmur flu shot was administered

## 2018-10-29 NOTE — REVIEW OF SYSTEMS
[Fatigue] : fatigue [Vision Problems] : vision problems [Hearing Loss] : hearing loss [Cough] : cough [Nocturia] : nocturia [Frequency] : frequency [Joint Pain] : joint pain [Joint Stiffness] : joint stiffness [Muscle Weakness] : muscle weakness [Muscle Pain] : muscle pain [Insomnia] : insomnia [Anxiety] : anxiety [Depression] : depression [Negative] : Gastrointestinal

## 2018-10-29 NOTE — PHYSICAL EXAM
[Normal Outer Ear/Nose] : the outer ears and nose were normal in appearance [Normal Oropharynx] : the oropharynx was normal [No JVD] : no jugular venous distention [Supple] : supple [No Respiratory Distress] : no respiratory distress  [Clear to Auscultation] : lungs were clear to auscultation bilaterally [No Accessory Muscle Use] : no accessory muscle use [Normal Rate] : normal rate  [Regular Rhythm] : with a regular rhythm [Normal S1, S2] : normal S1 and S2 [Soft] : abdomen soft [Non Tender] : non-tender [Non-distended] : non-distended [No HSM] : no HSM [Normal Bowel Sounds] : normal bowel sounds [No Hernias] : no hernias [No Rash] : no rash [No Skin Lesions] : no skin lesions

## 2018-10-30 LAB
25(OH)D3 SERPL-MCNC: 31.6 NG/ML
ALBUMIN SERPL ELPH-MCNC: 4.5 G/DL
ALP BLD-CCNC: 73 U/L
ALT SERPL-CCNC: 14 U/L
ANION GAP SERPL CALC-SCNC: 11 MMOL/L
AST SERPL-CCNC: 24 U/L
BILIRUB SERPL-MCNC: 0.5 MG/DL
BUN SERPL-MCNC: 19 MG/DL
CALCIUM SERPL-MCNC: 10 MG/DL
CHLORIDE SERPL-SCNC: 106 MMOL/L
CHOLEST SERPL-MCNC: 187 MG/DL
CHOLEST/HDLC SERPL: 2 RATIO
CO2 SERPL-SCNC: 27 MMOL/L
CREAT SERPL-MCNC: 0.57 MG/DL
GLUCOSE SERPL-MCNC: 134 MG/DL
HBA1C MFR BLD HPLC: 5.5 %
HDLC SERPL-MCNC: 92 MG/DL
LDLC SERPL CALC-MCNC: 79 MG/DL
POTASSIUM SERPL-SCNC: 4.3 MMOL/L
PROT SERPL-MCNC: 8.1 G/DL
SAVE SPECIMEN: NORMAL
SODIUM SERPL-SCNC: 144 MMOL/L
T3RU NFR SERPL: 1.1 INDEX
T4 SERPL-MCNC: 6.8 UG/DL
TRIGL SERPL-MCNC: 78 MG/DL
TSH SERPL-ACNC: 1.34 UIU/ML
URATE SERPL-MCNC: 3.7 MG/DL

## 2018-11-02 ENCOUNTER — MEDICATION RENEWAL (OUTPATIENT)
Age: 83
End: 2018-11-02

## 2018-11-29 ENCOUNTER — RX RENEWAL (OUTPATIENT)
Age: 83
End: 2018-11-29

## 2018-11-30 ENCOUNTER — RX RENEWAL (OUTPATIENT)
Age: 83
End: 2018-11-30

## 2018-11-30 ENCOUNTER — APPOINTMENT (OUTPATIENT)
Dept: OPHTHALMOLOGY | Facility: CLINIC | Age: 83
End: 2018-11-30
Payer: MEDICARE

## 2018-11-30 PROCEDURE — 76514 ECHO EXAM OF EYE THICKNESS: CPT

## 2018-11-30 PROCEDURE — 92133 CPTRZD OPH DX IMG PST SGM ON: CPT

## 2018-11-30 PROCEDURE — 92004 COMPRE OPH EXAM NEW PT 1/>: CPT

## 2018-12-13 ENCOUNTER — RX RENEWAL (OUTPATIENT)
Age: 83
End: 2018-12-13

## 2018-12-27 ENCOUNTER — RX RENEWAL (OUTPATIENT)
Age: 83
End: 2018-12-27

## 2018-12-28 ENCOUNTER — APPOINTMENT (OUTPATIENT)
Dept: OPHTHALMOLOGY | Facility: CLINIC | Age: 83
End: 2018-12-28
Payer: MEDICARE

## 2018-12-28 PROCEDURE — 92083 EXTENDED VISUAL FIELD XM: CPT

## 2018-12-28 PROCEDURE — ZZZZZ: CPT

## 2018-12-28 PROCEDURE — 92012 INTRM OPH EXAM EST PATIENT: CPT

## 2019-01-25 ENCOUNTER — APPOINTMENT (OUTPATIENT)
Dept: INTERNAL MEDICINE | Facility: CLINIC | Age: 84
End: 2019-01-25

## 2019-01-26 ENCOUNTER — RX RENEWAL (OUTPATIENT)
Age: 84
End: 2019-01-26

## 2019-01-28 ENCOUNTER — MEDICATION RENEWAL (OUTPATIENT)
Age: 84
End: 2019-01-28

## 2019-01-31 ENCOUNTER — FORM ENCOUNTER (OUTPATIENT)
Age: 84
End: 2019-01-31

## 2019-02-01 ENCOUNTER — OUTPATIENT (OUTPATIENT)
Dept: OUTPATIENT SERVICES | Facility: HOSPITAL | Age: 84
LOS: 1 days | End: 2019-02-01
Payer: MEDICARE

## 2019-02-01 ENCOUNTER — NON-APPOINTMENT (OUTPATIENT)
Age: 84
End: 2019-02-01

## 2019-02-01 ENCOUNTER — APPOINTMENT (OUTPATIENT)
Dept: INTERNAL MEDICINE | Facility: CLINIC | Age: 84
End: 2019-02-01
Payer: MEDICARE

## 2019-02-01 ENCOUNTER — APPOINTMENT (OUTPATIENT)
Dept: ULTRASOUND IMAGING | Facility: CLINIC | Age: 84
End: 2019-02-01
Payer: MEDICARE

## 2019-02-01 VITALS — DIASTOLIC BLOOD PRESSURE: 113 MMHG | HEART RATE: 111 BPM | SYSTOLIC BLOOD PRESSURE: 162 MMHG | HEIGHT: 61.5 IN

## 2019-02-01 DIAGNOSIS — Z98.89 OTHER SPECIFIED POSTPROCEDURAL STATES: Chronic | ICD-10-CM

## 2019-02-01 DIAGNOSIS — Z98.49 CATARACT EXTRACTION STATUS, UNSPECIFIED EYE: Chronic | ICD-10-CM

## 2019-02-01 DIAGNOSIS — I10 ESSENTIAL (PRIMARY) HYPERTENSION: ICD-10-CM

## 2019-02-01 LAB
BASOPHILS # BLD AUTO: 0.01 K/UL
BASOPHILS # BLD AUTO: 0.01 K/UL
BASOPHILS NFR BLD AUTO: 0.2 %
BASOPHILS NFR BLD AUTO: 0.2 %
EOSINOPHIL # BLD AUTO: 0.03 K/UL
EOSINOPHIL # BLD AUTO: 0.04 K/UL
EOSINOPHIL NFR BLD AUTO: 0.6 %
EOSINOPHIL NFR BLD AUTO: 0.7 %
HCT VFR BLD CALC: 38.1 %
HCT VFR BLD CALC: 38.7 %
HGB BLD-MCNC: 11.9 G/DL
HGB BLD-MCNC: 12 G/DL
IMM GRANULOCYTES NFR BLD AUTO: 0.2 %
IMM GRANULOCYTES NFR BLD AUTO: 0.2 %
LYMPHOCYTES # BLD AUTO: 1.21 K/UL
LYMPHOCYTES # BLD AUTO: 1.78 K/UL
LYMPHOCYTES NFR BLD AUTO: 21.7 %
LYMPHOCYTES NFR BLD AUTO: 32.7 %
MAN DIFF?: NORMAL
MAN DIFF?: NORMAL
MCHC RBC-ENTMCNC: 31 GM/DL
MCHC RBC-ENTMCNC: 31.2 GM/DL
MCHC RBC-ENTMCNC: 31.6 PG
MCHC RBC-ENTMCNC: 31.7 PG
MCV RBC AUTO: 101.3 FL
MCV RBC AUTO: 102.4 FL
MONOCYTES # BLD AUTO: 0.31 K/UL
MONOCYTES # BLD AUTO: 0.32 K/UL
MONOCYTES NFR BLD AUTO: 5.6 %
MONOCYTES NFR BLD AUTO: 5.9 %
NEUTROPHILS # BLD AUTO: 3.29 K/UL
NEUTROPHILS # BLD AUTO: 3.99 K/UL
NEUTROPHILS NFR BLD AUTO: 60.4 %
NEUTROPHILS NFR BLD AUTO: 71.6 %
PLATELET # BLD AUTO: 155 K/UL
PLATELET # BLD AUTO: 166 K/UL
RBC # BLD: 3.76 M/UL
RBC # BLD: 3.78 M/UL
RBC # FLD: 14.1 %
RBC # FLD: 14.5 %
WBC # FLD AUTO: 5.44 K/UL
WBC # FLD AUTO: 5.57 K/UL

## 2019-02-01 PROCEDURE — 76775 US EXAM ABDO BACK WALL LIM: CPT

## 2019-02-01 PROCEDURE — 36415 COLL VENOUS BLD VENIPUNCTURE: CPT

## 2019-02-01 PROCEDURE — 93000 ELECTROCARDIOGRAM COMPLETE: CPT

## 2019-02-01 PROCEDURE — 99214 OFFICE O/P EST MOD 30 MIN: CPT | Mod: 25

## 2019-02-01 PROCEDURE — 76775 US EXAM ABDO BACK WALL LIM: CPT | Mod: 26

## 2019-02-01 RX ORDER — ASPIRIN 81 MG/1
81 TABLET, CHEWABLE ORAL
Qty: 30 | Refills: 0 | Status: DISCONTINUED | COMMUNITY
Start: 2018-01-19 | End: 2019-02-01

## 2019-02-01 RX ORDER — DOCUSATE SODIUM 100 MG/1
100 CAPSULE, LIQUID FILLED ORAL
Qty: 90 | Refills: 0 | Status: DISCONTINUED | COMMUNITY
Start: 2018-06-18 | End: 2019-02-01

## 2019-02-01 NOTE — HISTORY OF PRESENT ILLNESS
[de-identified] : 89 year old female here today for a routine follow up but she states that she has a Nervous stomach, it is happening frequently. She has no pain. it is an uncomfortable feeling. She is having normal Bowel movements.   She states it feels daily multiple times.  She states that she then needs to rest.  \par She states she takes her lorazepam once a day but never has any left over. \par She is on Riseperadol, melatonin, and Lorazpam all at night. \par She has a history of hypertension. She is on medication. \par \par \par \par

## 2019-02-01 NOTE — PHYSICAL EXAM
[No Acute Distress] : no acute distress [Well Nourished] : well nourished [Well Developed] : well developed [Well-Appearing] : well-appearing [No Respiratory Distress] : no respiratory distress  [Clear to Auscultation] : lungs were clear to auscultation bilaterally [No Accessory Muscle Use] : no accessory muscle use [Regular Rhythm] : with a regular rhythm [Normal S1, S2] : normal S1 and S2 [Soft] : abdomen soft [No HSM] : no HSM [Normal Bowel Sounds] : normal bowel sounds [Normal Insight/Judgement] : insight and judgment were intact [de-identified] : tachycardic  [de-identified] : pulsatile epigastric area and LLQ

## 2019-02-01 NOTE — ASSESSMENT
[FreeTextEntry1] : BP, Tachycardia, pulsatile Aorta--STAT US \par R/O AAA\par Increase Metoprolol to 50mg BID \par Routine labs\par \par Stomach issues: start omeprazole \par \par reconcilled meds\par \par routine labs \par

## 2019-02-05 ENCOUNTER — MEDICATION RENEWAL (OUTPATIENT)
Age: 84
End: 2019-02-05

## 2019-02-05 ENCOUNTER — OTHER (OUTPATIENT)
Age: 84
End: 2019-02-05

## 2019-02-05 LAB
ALBUMIN SERPL ELPH-MCNC: 4.1 G/DL
ALP BLD-CCNC: 69 U/L
ALT SERPL-CCNC: 20 U/L
ANION GAP SERPL CALC-SCNC: 11 MMOL/L
AST SERPL-CCNC: 23 U/L
BASOPHILS # BLD AUTO: 0.01 K/UL
BASOPHILS NFR BLD AUTO: 0.2 %
BILIRUB SERPL-MCNC: 0.5 MG/DL
BUN SERPL-MCNC: 21 MG/DL
CALCIUM SERPL-MCNC: 9.7 MG/DL
CHLORIDE SERPL-SCNC: 106 MMOL/L
CHOLEST SERPL-MCNC: 161 MG/DL
CHOLEST/HDLC SERPL: 2.3 RATIO
CO2 SERPL-SCNC: 25 MMOL/L
CREAT SERPL-MCNC: 0.65 MG/DL
EOSINOPHIL # BLD AUTO: 0.05 K/UL
EOSINOPHIL NFR BLD AUTO: 1 %
GLUCOSE SERPL-MCNC: 132 MG/DL
HCT VFR BLD CALC: 37.8 %
HDLC SERPL-MCNC: 70 MG/DL
HGB BLD-MCNC: 11.7 G/DL
IMM GRANULOCYTES NFR BLD AUTO: 0.2 %
LDLC SERPL CALC-MCNC: 76 MG/DL
LYMPHOCYTES # BLD AUTO: 1.33 K/UL
LYMPHOCYTES NFR BLD AUTO: 25.4 %
MAN DIFF?: NORMAL
MCHC RBC-ENTMCNC: 30.6 PG
MCHC RBC-ENTMCNC: 31 GM/DL
MCV RBC AUTO: 99 FL
MONOCYTES # BLD AUTO: 0.32 K/UL
MONOCYTES NFR BLD AUTO: 6.1 %
NEUTROPHILS # BLD AUTO: 3.51 K/UL
NEUTROPHILS NFR BLD AUTO: 67.1 %
PLATELET # BLD AUTO: 165 K/UL
POTASSIUM SERPL-SCNC: 4.3 MMOL/L
PROT SERPL-MCNC: 7 G/DL
RBC # BLD: 3.82 M/UL
RBC # FLD: 15 %
SAVE SPECIMEN: NORMAL
SODIUM SERPL-SCNC: 142 MMOL/L
T4 SERPL-MCNC: 6.5 UG/DL
TRIGL SERPL-MCNC: 74 MG/DL
TSH SERPL-ACNC: 1.34 UIU/ML
WBC # FLD AUTO: 5.23 K/UL

## 2019-02-07 ENCOUNTER — OTHER (OUTPATIENT)
Age: 84
End: 2019-02-07

## 2019-02-08 ENCOUNTER — APPOINTMENT (OUTPATIENT)
Dept: OPHTHALMOLOGY | Facility: CLINIC | Age: 84
End: 2019-02-08

## 2019-03-04 ENCOUNTER — APPOINTMENT (OUTPATIENT)
Dept: OPHTHALMOLOGY | Facility: CLINIC | Age: 84
End: 2019-03-04
Payer: MEDICARE

## 2019-03-04 DIAGNOSIS — H40.053 OCULAR HYPERTENSION, BILATERAL: ICD-10-CM

## 2019-03-04 PROCEDURE — 92012 INTRM OPH EXAM EST PATIENT: CPT

## 2019-04-04 ENCOUNTER — RX RENEWAL (OUTPATIENT)
Age: 84
End: 2019-04-04

## 2019-04-04 ENCOUNTER — MEDICATION RENEWAL (OUTPATIENT)
Age: 84
End: 2019-04-04

## 2019-05-06 ENCOUNTER — NON-APPOINTMENT (OUTPATIENT)
Age: 84
End: 2019-05-06

## 2019-05-06 ENCOUNTER — APPOINTMENT (OUTPATIENT)
Dept: INTERNAL MEDICINE | Facility: CLINIC | Age: 84
End: 2019-05-06
Payer: MEDICARE

## 2019-05-06 VITALS — HEIGHT: 61.5 IN | WEIGHT: 115 LBS | BODY MASS INDEX: 21.43 KG/M2

## 2019-05-06 VITALS — SYSTOLIC BLOOD PRESSURE: 140 MMHG | DIASTOLIC BLOOD PRESSURE: 70 MMHG

## 2019-05-06 PROCEDURE — 99214 OFFICE O/P EST MOD 30 MIN: CPT | Mod: 25

## 2019-05-06 PROCEDURE — 93000 ELECTROCARDIOGRAM COMPLETE: CPT

## 2019-05-06 PROCEDURE — 36415 COLL VENOUS BLD VENIPUNCTURE: CPT

## 2019-05-06 RX ORDER — METOPROLOL SUCCINATE 25 MG/1
25 TABLET, EXTENDED RELEASE ORAL
Qty: 180 | Refills: 0 | Status: DISCONTINUED | COMMUNITY
Start: 2019-04-04 | End: 2019-05-06

## 2019-05-06 NOTE — HISTORY OF PRESENT ILLNESS
[de-identified] : This is a 89-year-old patient with a history of hypertension, hypercholesterolemia, elevated A1c, and anxiety disorder who is here today for a followup visit. The patient claims to be feeling well although she finds that occasionally she is more forgetful than usual. She denies any chest pains or shortness of breath

## 2019-05-06 NOTE — REVIEW OF SYSTEMS
[Vision Problems] : vision problems [Fatigue] : fatigue [Hearing Loss] : hearing loss [Nocturia] : nocturia [Cough] : no cough [Joint Stiffness] : joint stiffness [Frequency] : frequency [Joint Pain] : joint pain [Insomnia] : insomnia [Muscle Pain] : muscle pain [Muscle Weakness] : muscle weakness [Depression] : depression [Anxiety] : anxiety [Negative] : Cardiovascular

## 2019-05-06 NOTE — ASSESSMENT
[FreeTextEntry1] : Problems\par Hypercholesterolemia\par Hypertension\par Elevated A1c\par Tachycardia\par Assessment\par This is a 89-year-old lady with a history of medical disorders we should today for a followup visit her main complaint today is episodes of vertigo which is responding very well to meclizine but in addition she has periods where she is getting little increased successful. Her blood pressure too was very well controlled with the metoprolol. She presently is on a statin with her cholesterol and sugar is controlled. She has an anxiety disorder which occasionally she responds to live for his Lyme. Her EKG does not show any acute changes.

## 2019-05-06 NOTE — PHYSICAL EXAM
[Normal Oropharynx] : the oropharynx was normal [Supple] : supple [No Accessory Muscle Use] : no accessory muscle use [Clear to Auscultation] : lungs were clear to auscultation bilaterally [Regular Rhythm] : with a regular rhythm [No Masses] : no palpable masses [Normal S1, S2] : normal S1 and S2 [No Nipple Discharge] : no nipple discharge [Soft] : abdomen soft [Non Tender] : non-tender [No HSM] : no HSM [Non-distended] : non-distended [Normal Bowel Sounds] : normal bowel sounds [No Skin Lesions] : no skin lesions [No Rash] : no rash [No Hernias] : no hernias

## 2019-05-07 LAB
25(OH)D3 SERPL-MCNC: 36.1 NG/ML
ALBUMIN SERPL ELPH-MCNC: 4 G/DL
ALP BLD-CCNC: 73 U/L
ALT SERPL-CCNC: 14 U/L
ANION GAP SERPL CALC-SCNC: 15 MMOL/L
AST SERPL-CCNC: 21 U/L
BASOPHILS # BLD AUTO: 0.02 K/UL
BASOPHILS NFR BLD AUTO: 0.4 %
BILIRUB SERPL-MCNC: 0.4 MG/DL
BUN SERPL-MCNC: 21 MG/DL
CALCIUM SERPL-MCNC: 9.4 MG/DL
CHLORIDE SERPL-SCNC: 105 MMOL/L
CHOLEST SERPL-MCNC: 177 MG/DL
CHOLEST/HDLC SERPL: 2.5 RATIO
CO2 SERPL-SCNC: 22 MMOL/L
CREAT SERPL-MCNC: 0.55 MG/DL
EOSINOPHIL # BLD AUTO: 0.06 K/UL
EOSINOPHIL NFR BLD AUTO: 1.1 %
ESTIMATED AVERAGE GLUCOSE: 123 MG/DL
GLUCOSE SERPL-MCNC: 115 MG/DL
HBA1C MFR BLD HPLC: 5.9 %
HCT VFR BLD CALC: 36.6 %
HDLC SERPL-MCNC: 72 MG/DL
HGB BLD-MCNC: 11.5 G/DL
IMM GRANULOCYTES NFR BLD AUTO: 0.2 %
LDLC SERPL CALC-MCNC: 96 MG/DL
LYMPHOCYTES # BLD AUTO: 1.35 K/UL
LYMPHOCYTES NFR BLD AUTO: 24.9 %
MAN DIFF?: NORMAL
MCHC RBC-ENTMCNC: 31.4 GM/DL
MCHC RBC-ENTMCNC: 31.5 PG
MCV RBC AUTO: 100.3 FL
MONOCYTES # BLD AUTO: 0.81 K/UL
MONOCYTES NFR BLD AUTO: 14.9 %
NEUTROPHILS # BLD AUTO: 3.17 K/UL
NEUTROPHILS NFR BLD AUTO: 58.5 %
PLATELET # BLD AUTO: 149 K/UL
POTASSIUM SERPL-SCNC: 4 MMOL/L
PROT SERPL-MCNC: 7.5 G/DL
RBC # BLD: 3.65 M/UL
RBC # FLD: 13.7 %
SAVE SPECIMEN: NORMAL
SODIUM SERPL-SCNC: 142 MMOL/L
T4 SERPL-MCNC: 6.3 UG/DL
TRIGL SERPL-MCNC: 45 MG/DL
TSH SERPL-ACNC: 1.04 UIU/ML
URATE SERPL-MCNC: 2.9 MG/DL
WBC # FLD AUTO: 5.42 K/UL

## 2019-05-13 ENCOUNTER — MEDICATION RENEWAL (OUTPATIENT)
Age: 84
End: 2019-05-13

## 2019-06-30 ENCOUNTER — RX RENEWAL (OUTPATIENT)
Age: 84
End: 2019-06-30

## 2019-07-25 ENCOUNTER — NON-APPOINTMENT (OUTPATIENT)
Age: 84
End: 2019-07-25

## 2019-07-25 ENCOUNTER — INPATIENT (INPATIENT)
Facility: HOSPITAL | Age: 84
LOS: 5 days | Discharge: HOME HEALTH | End: 2019-07-31
Attending: INTERNAL MEDICINE | Admitting: INTERNAL MEDICINE
Payer: MEDICARE

## 2019-07-25 ENCOUNTER — APPOINTMENT (OUTPATIENT)
Dept: INTERNAL MEDICINE | Facility: CLINIC | Age: 84
End: 2019-07-25
Payer: MEDICARE

## 2019-07-25 VITALS — BODY MASS INDEX: 21.62 KG/M2 | WEIGHT: 116 LBS | HEIGHT: 61.5 IN

## 2019-07-25 VITALS
HEART RATE: 135 BPM | OXYGEN SATURATION: 100 % | TEMPERATURE: 98 F | SYSTOLIC BLOOD PRESSURE: 134 MMHG | RESPIRATION RATE: 16 BRPM | DIASTOLIC BLOOD PRESSURE: 79 MMHG

## 2019-07-25 VITALS — SYSTOLIC BLOOD PRESSURE: 140 MMHG | DIASTOLIC BLOOD PRESSURE: 70 MMHG

## 2019-07-25 DIAGNOSIS — Z98.49 CATARACT EXTRACTION STATUS, UNSPECIFIED EYE: Chronic | ICD-10-CM

## 2019-07-25 DIAGNOSIS — E78.5 HYPERLIPIDEMIA, UNSPECIFIED: ICD-10-CM

## 2019-07-25 DIAGNOSIS — F41.9 ANXIETY DISORDER, UNSPECIFIED: ICD-10-CM

## 2019-07-25 DIAGNOSIS — Z98.89 OTHER SPECIFIED POSTPROCEDURAL STATES: Chronic | ICD-10-CM

## 2019-07-25 DIAGNOSIS — Z87.81 PERSONAL HISTORY OF (HEALED) TRAUMATIC FRACTURE: Chronic | ICD-10-CM

## 2019-07-25 DIAGNOSIS — I48.92 UNSPECIFIED ATRIAL FLUTTER: ICD-10-CM

## 2019-07-25 DIAGNOSIS — I10 ESSENTIAL (PRIMARY) HYPERTENSION: ICD-10-CM

## 2019-07-25 DIAGNOSIS — I48.91 UNSPECIFIED ATRIAL FIBRILLATION: ICD-10-CM

## 2019-07-25 LAB
ALBUMIN SERPL ELPH-MCNC: 3.5 G/DL — SIGNIFICANT CHANGE UP (ref 3.3–5)
ALP SERPL-CCNC: 70 U/L — SIGNIFICANT CHANGE UP (ref 40–120)
ALT FLD-CCNC: 34 U/L — HIGH (ref 4–33)
ANION GAP SERPL CALC-SCNC: 11 MMO/L — SIGNIFICANT CHANGE UP (ref 7–14)
APTT BLD: 29.5 SEC — SIGNIFICANT CHANGE UP (ref 27.5–36.3)
AST SERPL-CCNC: 21 U/L — SIGNIFICANT CHANGE UP (ref 4–32)
BASOPHILS # BLD AUTO: 0.01 K/UL — SIGNIFICANT CHANGE UP (ref 0–0.2)
BASOPHILS NFR BLD AUTO: 0.1 % — SIGNIFICANT CHANGE UP (ref 0–2)
BILIRUB SERPL-MCNC: 0.7 MG/DL — SIGNIFICANT CHANGE UP (ref 0.2–1.2)
BUN SERPL-MCNC: 28 MG/DL — HIGH (ref 7–23)
CALCIUM SERPL-MCNC: 9.7 MG/DL — SIGNIFICANT CHANGE UP (ref 8.4–10.5)
CHLORIDE SERPL-SCNC: 105 MMOL/L — SIGNIFICANT CHANGE UP (ref 98–107)
CO2 SERPL-SCNC: 24 MMOL/L — SIGNIFICANT CHANGE UP (ref 22–31)
CREAT SERPL-MCNC: 1.26 MG/DL — SIGNIFICANT CHANGE UP (ref 0.5–1.3)
EOSINOPHIL # BLD AUTO: 0.05 K/UL — SIGNIFICANT CHANGE UP (ref 0–0.5)
EOSINOPHIL NFR BLD AUTO: 0.7 % — SIGNIFICANT CHANGE UP (ref 0–6)
GLUCOSE SERPL-MCNC: 120 MG/DL — HIGH (ref 70–99)
HCT VFR BLD CALC: 36.3 % — SIGNIFICANT CHANGE UP (ref 34.5–45)
HGB BLD-MCNC: 11.5 G/DL — SIGNIFICANT CHANGE UP (ref 11.5–15.5)
IMM GRANULOCYTES NFR BLD AUTO: 1.2 % — SIGNIFICANT CHANGE UP (ref 0–1.5)
INR BLD: 1.08 — SIGNIFICANT CHANGE UP (ref 0.88–1.17)
LYMPHOCYTES # BLD AUTO: 1.25 K/UL — SIGNIFICANT CHANGE UP (ref 1–3.3)
LYMPHOCYTES # BLD AUTO: 17 % — SIGNIFICANT CHANGE UP (ref 13–44)
MCHC RBC-ENTMCNC: 31.3 PG — SIGNIFICANT CHANGE UP (ref 27–34)
MCHC RBC-ENTMCNC: 31.7 % — LOW (ref 32–36)
MCV RBC AUTO: 98.6 FL — SIGNIFICANT CHANGE UP (ref 80–100)
MONOCYTES # BLD AUTO: 0.86 K/UL — SIGNIFICANT CHANGE UP (ref 0–0.9)
MONOCYTES NFR BLD AUTO: 11.7 % — SIGNIFICANT CHANGE UP (ref 2–14)
NEUTROPHILS # BLD AUTO: 5.08 K/UL — SIGNIFICANT CHANGE UP (ref 1.8–7.4)
NEUTROPHILS NFR BLD AUTO: 69.3 % — SIGNIFICANT CHANGE UP (ref 43–77)
NRBC # FLD: 0 K/UL — SIGNIFICANT CHANGE UP (ref 0–0)
PLATELET # BLD AUTO: 214 K/UL — SIGNIFICANT CHANGE UP (ref 150–400)
PMV BLD: 10.3 FL — SIGNIFICANT CHANGE UP (ref 7–13)
POTASSIUM SERPL-MCNC: 4.8 MMOL/L — SIGNIFICANT CHANGE UP (ref 3.5–5.3)
POTASSIUM SERPL-SCNC: 4.8 MMOL/L — SIGNIFICANT CHANGE UP (ref 3.5–5.3)
PROT SERPL-MCNC: 7.3 G/DL — SIGNIFICANT CHANGE UP (ref 6–8.3)
PROTHROM AB SERPL-ACNC: 12 SEC — SIGNIFICANT CHANGE UP (ref 9.8–13.1)
RBC # BLD: 3.68 M/UL — LOW (ref 3.8–5.2)
RBC # FLD: 13 % — SIGNIFICANT CHANGE UP (ref 10.3–14.5)
SODIUM SERPL-SCNC: 140 MMOL/L — SIGNIFICANT CHANGE UP (ref 135–145)
TROPONIN T, HIGH SENSITIVITY: 16 NG/L — SIGNIFICANT CHANGE UP (ref ?–14)
WBC # BLD: 7.34 K/UL — SIGNIFICANT CHANGE UP (ref 3.8–10.5)
WBC # FLD AUTO: 7.34 K/UL — SIGNIFICANT CHANGE UP (ref 3.8–10.5)

## 2019-07-25 PROCEDURE — 99233 SBSQ HOSP IP/OBS HIGH 50: CPT

## 2019-07-25 PROCEDURE — 93000 ELECTROCARDIOGRAM COMPLETE: CPT

## 2019-07-25 PROCEDURE — 99223 1ST HOSP IP/OBS HIGH 75: CPT

## 2019-07-25 PROCEDURE — 99214 OFFICE O/P EST MOD 30 MIN: CPT | Mod: 25

## 2019-07-25 RX ORDER — SODIUM CHLORIDE 9 MG/ML
1000 INJECTION, SOLUTION INTRAVENOUS ONCE
Refills: 0 | Status: COMPLETED | OUTPATIENT
Start: 2019-07-25 | End: 2019-07-25

## 2019-07-25 RX ORDER — AMIODARONE HYDROCHLORIDE 400 MG/1
400 TABLET ORAL
Refills: 0 | Status: DISCONTINUED | OUTPATIENT
Start: 2019-07-25 | End: 2019-07-30

## 2019-07-25 RX ORDER — AMIODARONE HYDROCHLORIDE 400 MG/1
400 TABLET ORAL
Refills: 0 | Status: DISCONTINUED | OUTPATIENT
Start: 2019-07-25 | End: 2019-07-25

## 2019-07-25 RX ORDER — DILTIAZEM HCL 120 MG
30 CAPSULE, EXT RELEASE 24 HR ORAL ONCE
Refills: 0 | Status: COMPLETED | OUTPATIENT
Start: 2019-07-25 | End: 2019-07-25

## 2019-07-25 RX ORDER — DILTIAZEM HCL 120 MG
10 CAPSULE, EXT RELEASE 24 HR ORAL ONCE
Refills: 0 | Status: COMPLETED | OUTPATIENT
Start: 2019-07-25 | End: 2019-07-25

## 2019-07-25 RX ORDER — APIXABAN 2.5 MG/1
5 TABLET, FILM COATED ORAL
Refills: 0 | Status: DISCONTINUED | OUTPATIENT
Start: 2019-07-25 | End: 2019-07-25

## 2019-07-25 RX ORDER — ATENOLOL 25 MG/1
1 TABLET ORAL
Qty: 0 | Refills: 0 | DISCHARGE

## 2019-07-25 RX ORDER — MECLIZINE HYDROCHLORIDE 12.5 MG/1
12.5 TABLET ORAL
Qty: 30 | Refills: 3 | Status: DISCONTINUED | COMMUNITY
Start: 2018-07-03 | End: 2019-07-25

## 2019-07-25 RX ORDER — APIXABAN 2.5 MG/1
2.5 TABLET, FILM COATED ORAL
Refills: 0 | Status: DISCONTINUED | OUTPATIENT
Start: 2019-07-25 | End: 2019-07-31

## 2019-07-25 RX ORDER — ASPIRIN/CALCIUM CARB/MAGNESIUM 324 MG
81 TABLET ORAL DAILY
Refills: 0 | Status: DISCONTINUED | OUTPATIENT
Start: 2019-07-25 | End: 2019-07-31

## 2019-07-25 RX ORDER — RISPERIDONE 4 MG/1
0.5 TABLET ORAL AT BEDTIME
Refills: 0 | Status: DISCONTINUED | OUTPATIENT
Start: 2019-07-25 | End: 2019-07-31

## 2019-07-25 RX ORDER — METOPROLOL TARTRATE 50 MG
50 TABLET ORAL
Refills: 0 | Status: DISCONTINUED | OUTPATIENT
Start: 2019-07-25 | End: 2019-07-31

## 2019-07-25 RX ORDER — HEPARIN SODIUM 5000 [USP'U]/ML
5000 INJECTION INTRAVENOUS; SUBCUTANEOUS
Refills: 0 | Status: DISCONTINUED | OUTPATIENT
Start: 2019-07-25 | End: 2019-07-25

## 2019-07-25 RX ORDER — METOPROLOL SUCCINATE 25 MG/1
25 TABLET, EXTENDED RELEASE ORAL
Refills: 2 | Status: DISCONTINUED | COMMUNITY
Start: 2019-05-06 | End: 2019-07-25

## 2019-07-25 RX ORDER — DOCUSATE SODIUM 100 MG/1
100 CAPSULE ORAL
Qty: 90 | Refills: 3 | Status: DISCONTINUED | COMMUNITY
Start: 2018-01-19 | End: 2019-07-25

## 2019-07-25 RX ORDER — ATORVASTATIN CALCIUM 80 MG/1
10 TABLET, FILM COATED ORAL AT BEDTIME
Refills: 0 | Status: DISCONTINUED | OUTPATIENT
Start: 2019-07-25 | End: 2019-07-31

## 2019-07-25 RX ADMIN — APIXABAN 2.5 MILLIGRAM(S): 2.5 TABLET, FILM COATED ORAL at 19:09

## 2019-07-25 RX ADMIN — ATORVASTATIN CALCIUM 10 MILLIGRAM(S): 80 TABLET, FILM COATED ORAL at 22:53

## 2019-07-25 RX ADMIN — Medication 50 MILLIGRAM(S): at 19:08

## 2019-07-25 RX ADMIN — Medication 0.5 MILLIGRAM(S): at 19:08

## 2019-07-25 RX ADMIN — Medication 30 MILLIGRAM(S): at 12:04

## 2019-07-25 RX ADMIN — Medication 10 MILLIGRAM(S): at 11:47

## 2019-07-25 RX ADMIN — AMIODARONE HYDROCHLORIDE 400 MILLIGRAM(S): 400 TABLET ORAL at 19:08

## 2019-07-25 RX ADMIN — Medication 10 MILLIGRAM(S): at 17:01

## 2019-07-25 RX ADMIN — RISPERIDONE 0.5 MILLIGRAM(S): 4 TABLET ORAL at 22:53

## 2019-07-25 RX ADMIN — SODIUM CHLORIDE 1000 MILLILITER(S): 9 INJECTION, SOLUTION INTRAVENOUS at 11:48

## 2019-07-25 NOTE — ED PROVIDER NOTE - PROGRESS NOTE DETAILS
pt continues to appear well s/p dilt- current HR in the 70s. Mentating well, non-focal exam. Spoke to  pt continues to appear well s/p dilt- current HR in the 70s. Mentating well, non-focal exam. Admitted to tele for continued work up  Carmen Renteria, PGY-3 EM

## 2019-07-25 NOTE — H&P ADULT - NSICDXPASTSURGICALHX_GEN_ALL_CORE_FT
PAST SURGICAL HISTORY:  H/O cataract extraction     H/O fracture of patella     H/O fracture of patella     S/P breast biopsy

## 2019-07-25 NOTE — ED ADULT TRIAGE NOTE - CHIEF COMPLAINT QUOTE
Pt sent from PMD for BAILEE.  Pt states that she went to PMD for abd pain for the past year and was found to be in BAILEE.  Pt denies palpitations, denies CP, no SOB

## 2019-07-25 NOTE — H&P ADULT - HISTORY OF PRESENT ILLNESS
89 yo F HTN, chol, delusional disorder sent by PMD for afib RVR. PT was found to be in narrow complex tachycardia @ 135 bpm (due to afib vs SVT vs 2:1 a flutter). PT without any complaints. NO chest pain, SOB, KNAPP, PND, orthopnea, palpitations, diaphoresis, lightheadedness, dizziness, syncope, increased lower extremity edema, fever chills, malaise, myalgias, anorexia, weight changes ( loss or gain), night sweats, generalized fatigue abdominal pain, N/V/C/D BRBPR, melena, urinary symptoms, cough, and wheezing.

## 2019-07-25 NOTE — ED ADULT NURSE NOTE - OBJECTIVE STATEMENT
Pt arrives in ER after a drs office visit found her in a-fib/a-flutter with a rate of 135.Pt asymptomatic--no complaints--denies CP or SOB  Pt had #20 placed rt wrist area--labs drawn and sent, 1 Liter of LR hung   Pt given 20mg cardizem iv over 2 minutes--heart rate decreased to 68--aflutter--cardizem 30mg given IV

## 2019-07-25 NOTE — H&P ADULT - RS GEN PE MLT RESP DETAILS PC
no rales/clear to auscultation bilaterally/good air movement/airway patent/no chest wall tenderness/no rhonchi/no subcutaneous emphysema/normal/no wheezes/airway obstructed/breath sounds equal/respirations non-labored

## 2019-07-25 NOTE — H&P ADULT - NEGATIVE CARDIOVASCULAR SYMPTOMS
no palpitations/no peripheral edema/no orthopnea/no paroxysmal nocturnal dyspnea/no claudication/no dyspnea on exertion/no chest pain

## 2019-07-25 NOTE — ED PROVIDER NOTE - OBJECTIVE STATEMENT
89 yo F hx of htn, hld, unclear hx 89 yo F hx of htn, hld, unclear hx of AF/Aflutter-was seen here 2 years ago for pre-op clearance was found to have tachycardia in the 140s at the time, now presents from Dr. Peterson's office for increased HR. Pt currently denies any complaints. If she was not told her HR was high, she said she would not know. Denies cp or sob or palpitations. Denies infectious sx including cough, congestion, urinary sx. Pt currently in the 140s, appears to be AF vs aflutter. Mentating well with normal BP.

## 2019-07-25 NOTE — H&P ADULT - ASSESSMENT
89 yo F HTN, chol, delusional disorder sent by PMD for afib RVR now in 3:1 a flutter ( rate =80 bpm)

## 2019-07-25 NOTE — ED PROVIDER NOTE - NS ED ROS FT
General: No fevers / chills  HENT: No head trauma, ear pain, runny nose, or sore throat  Eyes: No visual changes  CP: No chest pain, palpitations, or lightheadedness  Resp: No shortness of breath, no cough  GI: No abdominal pain, diarrhea, constipation, nausea, or vomiting  : No urinary fz, dysuria, or hematuria  Neuro: No numbness, tingling, or weakness  Endo: No hx of diabetes

## 2019-07-25 NOTE — H&P ADULT - GASTROINTESTINAL DETAILS
no rigidity/normal/no rebound tenderness/no masses palpable/no guarding/no organomegaly/no distention/soft/nontender/bowel sounds normal/no bruit

## 2019-07-25 NOTE — H&P ADULT - NSICDXPASTMEDICALHX_GEN_ALL_CORE_FT
PAST MEDICAL HISTORY:  Atrial tachycardia     Cataracts, bilateral     Delusional disorder     H/O fracture of patella     Hyperlipidemia     Hypertension

## 2019-07-25 NOTE — H&P ADULT - NEGATIVE GASTROINTESTINAL SYMPTOMS
no nausea/no hematochezia/no hiccoughs/no diarrhea/no change in bowel habits/no flatulence/no melena/no jaundice/no vomiting/no constipation/no abdominal pain/no steatorrhea

## 2019-07-25 NOTE — ED ADULT NURSE NOTE - NSIMPLEMENTINTERV_GEN_ALL_ED
Implemented All Universal Safety Interventions:  [place] to call system. Call bell, personal items and telephone within reach. Instruct patient to call for assistance. Room bathroom lighting operational. Non-slip footwear when patient is off stretcher. Physically safe environment: no spills, clutter or unnecessary equipment. Stretcher in lowest position, wheels locked, appropriate side rails in place.

## 2019-07-25 NOTE — ED PROVIDER NOTE - CLINICAL SUMMARY MEDICAL DECISION MAKING FREE TEXT BOX
Appears to have new onset AF/atrial flutter, here 2 years ago with similar circumstances of incidental tachycardia, at the time didn't resolve with dilt. Unclear what follow up she had. Today current in stable flutter, normotensive, mentating well, non focal exam, unlikely infectious cause provoking sx. Will admit to tele for EP consult and further follow up  Carmen Renteria, PGY-3 EM

## 2019-07-25 NOTE — H&P ADULT - PROBLEM SELECTOR PLAN 1
Ep consult called -possible FLORIN DCCV in am +/- antiarrythmia- awaiting consult recs  Continue aspirin and metoprolol 50 bid  CHADS Vasc= 4  telemetry, ekg, heck TSH. and continue metoprolol and asa  case dw hospitalists  Check TTE Ep consult called -possible FLORIN DCCV in am +/- antiarrythmia- awaiting consult recs  Continue aspirin and metoprolol 50 bid,  TTE, TFT    ATTEND ADDENDUM   per ep will additionally load with amiodarone for more optimal rhythm control in addition to BB coverage for rate control  CHADS Vasc= 4; per EP will start AC given left-sided atrial t-cardia elevates stroke risk

## 2019-07-25 NOTE — CONSULT NOTE ADULT - SUBJECTIVE AND OBJECTIVE BOX
CHIEF COMPLAINT: Called to evaluate patient with atrial tachycardia/atrial flutter     HISTORY OF PRESENT ILLNESS:  89 yo F HTN, HLD, delusional disorder sent by PMD as patient had atrial tachycardia/atrial flutter while at the doctor's office for regular check up. EKG revealed atrial tachycardia/atrial flutter with  bpm.  Patient without any associated symptoms of chest pain, SOB, palpitations, dizziness,, orthopnea, diaphoresis, lightheadedness, dizziness, syncope, increased lower extremity edema, fever, chills, or malaise. Patient reports abdominal discomfort for the last few days.  Patient was found to have atrial tachycardia with RVR in 2017 and was treated with Amiodarone load and maintenance dose of Amiodarone. Patient is currently on metoprolol. Telemetry shows atrial tachycardia with VR 70s-90s at this time.    PAST MEDICAL & SURGICAL HISTORY:  H/O fracture of patella  Atrial tachycardia  Cataracts, bilateral  Delusional disorder  Hyperlipidemia  Hypertension  H/O fracture of patella  H/O fracture of patella  H/O cataract extraction  S/P breast biopsy          PREVIOUS DIAGNOSTIC TESTING:    Echocardiogram: 12/1/2017  DIMENSIONS:  Dimensions:     Normal Values:  LA:     3.6 cm    2.0 - 4.0 cm  Ao:     3.0 cm    2.0 - 3.8 cm  SEPTUM: 0.8 cm    0.6 - 1.2 cm  PWT:    0.8 cm    0.6 - 1.1 cm  LVIDd:  4.0 cm    3.0 - 5.6 cm  LVIDs:  2.4 cm    1.8 - 4.0 cm  Derived Variables:  LVMI: 48 g/m2  RWT: 0.40  Fractional short: 40 %  Ejection Fraction (Teicholtz): 71 %  ------------------------------------------------------------------------  OBSERVATIONS:  Mitral Valve: Mitral annular calcification, otherwise  normal mitral valve. Mild mitral regurgitation.  Aortic Root: Normal aortic root.  Aortic Valve: Calcified trileaflet aortic valve with normal  opening.  Left Atrium: Normal left atrium.  LA volume index = 19  cc/m2.  Left Ventricle: Normal left ventricular systolic function.  No segmental wall motion abnormalities. Normal left  ventricular internal dimensions and wall thicknesses.  Right Heart: Normal right atrium. Normal right ventricular  size and function. Normal tricuspid valve. Mild tricuspid  regurgitation. Normal pulmonic valve. Mild pulmonic  regurgitation.  Pericardium/PleuraNormal pericardium with no pericardial  effusion.  ------------------------------------------------------------------------  CONCLUSIONS:  1. Mitral annular calcification, otherwise normal mitral  valve. Mild mitral regurgitation.  2. Calcified trileaflet aortic valve with normal opening.  3. Normal left ventricular systolic function. No segmental  wall motion abnormalities.  4. Normal right ventricular size and function.  5. Normal tricuspid valve. Mild tricuspid regurgitation.  ------------------------------------------------------------------------    	    MEDICATIONS:  aspirin  chewable 81 milliGRAM(s) Oral daily  heparin  Injectable 5000 Unit(s) SubCutaneous two times a day  metoprolol tartrate 50 milliGRAM(s) Oral two times a day  LORazepam     Tablet 0.5 milliGRAM(s) Oral daily  risperiDONE   Tablet 0.5 milliGRAM(s) Oral at bedtime  atorvastatin 10 milliGRAM(s) Oral at bedtime  multivitamin 1 Tablet(s) Oral daily      FAMILY HISTORY:  Family history of acute myocardial infarction (Uncle)  Family history of hypertension (Uncle)      SOCIAL HISTORY:    Lives alone  [-] Smoker  [-] Alcohol  [-] Drugs    Allergies    Cipro (Unknown)  latex (Urticaria)  penicillin (Unknown)    Intolerances    	    REVIEW OF SYSTEMS:  CONSTITUTIONAL: No fever, weight loss, or fatigue  EYES: No eye pain, visual disturbances, or discharge  ENMT:  No difficulty hearing, tinnitus, vertigo; No sinus or throat pain  NECK: No pain or stiffness  RESPIRATORY: No cough, wheezing, chills or hemoptysis; No Shortness of Breath  CARDIOVASCULAR: No chest pain, palpitations, passing out, dizziness, or leg swelling  GASTROINTESTINAL: No abdominal or epigastric pain. No nausea, vomiting, or hematemesis; No diarrhea or constipation. No melena or hematochezia.  GENITOURINARY: No dysuria, frequency, hematuria, or incontinence  NEUROLOGICAL: No headaches, memory loss, loss of strength, numbness, or tremors  SKIN: No itching, burning, rashes, or lesions   LYMPH Nodes: No enlarged glands  ENDOCRINE: No heat or cold intolerance; No hair loss  MUSCULOSKELETAL: No joint pain or swelling; No muscle, back, or extremity pain  PSYCHIATRIC: No depression, anxiety, mood swings, or difficulty sleeping  HEME/LYMPH: No easy bruising, or bleeding gums  ALLERY AND IMMUNOLOGIC: No hives or eczema	    [x] All others negative	  [ ] Unable to obtain    PHYSICAL EXAM:  T(C): 36.7 (07-25-19 @ 12:05), Max: 36.8 (07-25-19 @ 10:43)  HR: 100 (07-25-19 @ 12:49) (86 - 135)  BP: 168/100 (07-25-19 @ 12:49) (134/79 - 168/100)  RR: 19 (07-25-19 @ 12:49) (16 - 19)  SpO2: 100% (07-25-19 @ 12:49) (100% - 100%)  Wt(kg): --  I&O's Summary      Appearance: Normal	  HEENT:   Normal oral mucosa, PERRL, EOMI	  Lymphatic: No lymphadenopathy  Cardiovascular: Irregular S1 S2, No JVD, No murmurs, No edema  Respiratory: Lungs clear to auscultation	  Psychiatry: A & O x 3, Mood & affect appropriate  Gastrointestinal:  Soft, Non-tender, + BS	  Skin: No rashes, No ecchymoses, No cyanosis	  Neurologic: Non-focal  Extremities: Normal range of motion, No clubbing, cyanosis or edema  Vascular: Peripheral pulses palpable 2+ bilaterally    TELEMETRY: Atrial tachycardia with VR 70s-90s	    ECG:  Atrial tachycardia with  bpm	  RADIOLOGY:  OTHER: 	  	  LABS:	 	    CARDIAC MARKERS:                          11.5   7.34  )-----------( 214      ( 25 Jul 2019 11:16 )             36.3     07-25    140  |  105  |  28<H>  ----------------------------<  120<H>  4.8   |  24  |  1.26    Ca    9.7      25 Jul 2019 11:16    TPro  7.3  /  Alb  3.5  /  TBili  0.7  /  DBili  x   /  AST  21  /  ALT  34<H>  /  AlkPhos  70  07-25      TSH: Pending

## 2019-07-25 NOTE — H&P ADULT - NEGATIVE GENERAL SYMPTOMS
no fever/no weight loss/no fatigue/no weight gain/no polyphagia/no polyuria/no malaise/no sweating/no anorexia/no chills

## 2019-07-25 NOTE — CONSULT NOTE ADULT - ASSESSMENT
89 yo F HTN, HLD, atrial tachycardia and delusional disorder sent by PMD for asymmptomatic atrial tachycardia. Patient was being maintained on metoprolol.  - Continue home dose of metoprolol 50mg BID  - Echocardiogram to evaluate LV function and valve function  - Check TFTs  - Will d/w Dr. Benavides 89 yo F HTN, HLD, atrial tachycardia and delusional disorder sent by PMD for asymmptomatic atrial tachycardia. Patient was being maintained on metoprolol.  - Continue home dose of metoprolol 50mg BID  - Echocardiogram to evaluate LV function and valve function  - Check TFTs and LFTs     Addendum    Reviewed patient's EKG with Dr. Benavides. She is in atrial tachyardia which is most likely left sided. Her [id number] is 4 and her risk for stroke is 4.8%/year  - Would recommend anticoagulation with apixaban  - Start Amiodarone load 400mg BID and then change to 200mg daily 89 yo F HTN, HLD, atrial tachycardia and delusional disorder sent by PMD for asymmptomatic atrial tachycardia. Patient was being maintained on metoprolol.  - Continue home dose of metoprolol 50mg BID  - Echocardiogram to evaluate LV function and valve function  - Check TFTs and LFTs     Addendum    Reviewed patient's EKG with Dr. Benavides. She is in atrial tachyardia which is most likely left sided. Her [id number] is 4 and her risk for stroke is 4.8%/year. Discussed with patient and her son regarding anticoagulation She has no history of bleed in the past and no recent falls  - Would recommend anticoagulation with apixaban.   - Start Amiodarone load 400mg BID and then change to 200mg daily 89 yo F HTN, HLD, atrial tachycardia and delusional disorder sent by PMD for asymmptomatic atrial tachycardia. Patient was being maintained on metoprolol.  - Continue home dose of metoprolol 50mg BID  - Echocardiogram to evaluate LV function and valve function  - Check TFTs and LFTs     Addendum    Reviewed patient's EKG with Dr. Benavides. She is in atrial tachyardia which is most likely left sided. Her [id number] is 4 and her risk for stroke is 4.8%/year. Discussed with patient and her son regarding anticoagulation She has no history of bleed in the past and no falls  - Would recommend anticoagulation with apixaban.   - Start Amiodarone load 400mg BID and then change to 200mg daily

## 2019-07-25 NOTE — ED PROVIDER NOTE - ATTENDING CONTRIBUTION TO CARE
Dr. Sanchez: 89 yo female with HTN, HLD, afib/aflutter discovered on pre-op clearance in the past, in the ED after being sent by PMD due to elevated HR.  Pt does not have any complaints herself.  On exam pt well appearing, in NAD, heart tachycardic, lungs CTAB, abd NTND, extremities without swelling, strength 5/5 in all extremities and skin without rash.

## 2019-07-26 DIAGNOSIS — Z29.9 ENCOUNTER FOR PROPHYLACTIC MEASURES, UNSPECIFIED: ICD-10-CM

## 2019-07-26 LAB
ANION GAP SERPL CALC-SCNC: 9 MMO/L — SIGNIFICANT CHANGE UP (ref 7–14)
BASOPHILS # BLD AUTO: 0.02 K/UL — SIGNIFICANT CHANGE UP (ref 0–0.2)
BASOPHILS NFR BLD AUTO: 0.3 % — SIGNIFICANT CHANGE UP (ref 0–2)
BUN SERPL-MCNC: 27 MG/DL — HIGH (ref 7–23)
CALCIUM SERPL-MCNC: 9.5 MG/DL — SIGNIFICANT CHANGE UP (ref 8.4–10.5)
CHLORIDE SERPL-SCNC: 105 MMOL/L — SIGNIFICANT CHANGE UP (ref 98–107)
CHOLEST SERPL-MCNC: 138 MG/DL — SIGNIFICANT CHANGE UP (ref 120–199)
CO2 SERPL-SCNC: 27 MMOL/L — SIGNIFICANT CHANGE UP (ref 22–31)
CREAT SERPL-MCNC: 1.28 MG/DL — SIGNIFICANT CHANGE UP (ref 0.5–1.3)
EOSINOPHIL # BLD AUTO: 0.07 K/UL — SIGNIFICANT CHANGE UP (ref 0–0.5)
EOSINOPHIL NFR BLD AUTO: 1.1 % — SIGNIFICANT CHANGE UP (ref 0–6)
GLUCOSE SERPL-MCNC: 120 MG/DL — HIGH (ref 70–99)
HBA1C BLD-MCNC: 5.8 % — HIGH (ref 4–5.6)
HCT VFR BLD CALC: 34.2 % — LOW (ref 34.5–45)
HDLC SERPL-MCNC: 44 MG/DL — LOW (ref 45–65)
HGB BLD-MCNC: 10.9 G/DL — LOW (ref 11.5–15.5)
IMM GRANULOCYTES NFR BLD AUTO: 1.1 % — SIGNIFICANT CHANGE UP (ref 0–1.5)
LIPID PNL WITH DIRECT LDL SERPL: 87 MG/DL — SIGNIFICANT CHANGE UP
LYMPHOCYTES # BLD AUTO: 1.22 K/UL — SIGNIFICANT CHANGE UP (ref 1–3.3)
LYMPHOCYTES # BLD AUTO: 18.7 % — SIGNIFICANT CHANGE UP (ref 13–44)
MAGNESIUM SERPL-MCNC: 2 MG/DL — SIGNIFICANT CHANGE UP (ref 1.6–2.6)
MCHC RBC-ENTMCNC: 31.2 PG — SIGNIFICANT CHANGE UP (ref 27–34)
MCHC RBC-ENTMCNC: 31.9 % — LOW (ref 32–36)
MCV RBC AUTO: 98 FL — SIGNIFICANT CHANGE UP (ref 80–100)
MONOCYTES # BLD AUTO: 0.71 K/UL — SIGNIFICANT CHANGE UP (ref 0–0.9)
MONOCYTES NFR BLD AUTO: 10.9 % — SIGNIFICANT CHANGE UP (ref 2–14)
NEUTROPHILS # BLD AUTO: 4.44 K/UL — SIGNIFICANT CHANGE UP (ref 1.8–7.4)
NEUTROPHILS NFR BLD AUTO: 67.9 % — SIGNIFICANT CHANGE UP (ref 43–77)
NRBC # FLD: 0.02 K/UL — SIGNIFICANT CHANGE UP (ref 0–0)
PHOSPHATE SERPL-MCNC: 3 MG/DL — SIGNIFICANT CHANGE UP (ref 2.5–4.5)
PLATELET # BLD AUTO: 217 K/UL — SIGNIFICANT CHANGE UP (ref 150–400)
PMV BLD: 10.1 FL — SIGNIFICANT CHANGE UP (ref 7–13)
POTASSIUM SERPL-MCNC: 4.3 MMOL/L — SIGNIFICANT CHANGE UP (ref 3.5–5.3)
POTASSIUM SERPL-SCNC: 4.3 MMOL/L — SIGNIFICANT CHANGE UP (ref 3.5–5.3)
RBC # BLD: 3.49 M/UL — LOW (ref 3.8–5.2)
RBC # FLD: 13.2 % — SIGNIFICANT CHANGE UP (ref 10.3–14.5)
SODIUM SERPL-SCNC: 141 MMOL/L — SIGNIFICANT CHANGE UP (ref 135–145)
TRIGL SERPL-MCNC: 72 MG/DL — SIGNIFICANT CHANGE UP (ref 10–149)
TSH SERPL-MCNC: 2.67 UIU/ML — SIGNIFICANT CHANGE UP (ref 0.27–4.2)
WBC # BLD: 6.53 K/UL — SIGNIFICANT CHANGE UP (ref 3.8–10.5)
WBC # FLD AUTO: 6.53 K/UL — SIGNIFICANT CHANGE UP (ref 3.8–10.5)

## 2019-07-26 PROCEDURE — 99232 SBSQ HOSP IP/OBS MODERATE 35: CPT

## 2019-07-26 PROCEDURE — 93306 TTE W/DOPPLER COMPLETE: CPT | Mod: 26

## 2019-07-26 PROCEDURE — 99233 SBSQ HOSP IP/OBS HIGH 50: CPT

## 2019-07-26 RX ORDER — METOPROLOL TARTRATE 50 MG
2.5 TABLET ORAL ONCE
Refills: 0 | Status: COMPLETED | OUTPATIENT
Start: 2019-07-26 | End: 2019-07-26

## 2019-07-26 RX ORDER — LANOLIN ALCOHOL/MO/W.PET/CERES
3 CREAM (GRAM) TOPICAL AT BEDTIME
Refills: 0 | Status: DISCONTINUED | OUTPATIENT
Start: 2019-07-26 | End: 2019-07-27

## 2019-07-26 RX ADMIN — Medication 81 MILLIGRAM(S): at 11:20

## 2019-07-26 RX ADMIN — ATORVASTATIN CALCIUM 10 MILLIGRAM(S): 80 TABLET, FILM COATED ORAL at 21:25

## 2019-07-26 RX ADMIN — AMIODARONE HYDROCHLORIDE 400 MILLIGRAM(S): 400 TABLET ORAL at 05:45

## 2019-07-26 RX ADMIN — APIXABAN 2.5 MILLIGRAM(S): 2.5 TABLET, FILM COATED ORAL at 17:40

## 2019-07-26 RX ADMIN — Medication 1 TABLET(S): at 11:20

## 2019-07-26 RX ADMIN — Medication 50 MILLIGRAM(S): at 17:40

## 2019-07-26 RX ADMIN — AMIODARONE HYDROCHLORIDE 400 MILLIGRAM(S): 400 TABLET ORAL at 17:40

## 2019-07-26 RX ADMIN — APIXABAN 2.5 MILLIGRAM(S): 2.5 TABLET, FILM COATED ORAL at 05:46

## 2019-07-26 RX ADMIN — Medication 3 MILLIGRAM(S): at 21:25

## 2019-07-26 RX ADMIN — Medication 2.5 MILLIGRAM(S): at 03:46

## 2019-07-26 RX ADMIN — RISPERIDONE 0.5 MILLIGRAM(S): 4 TABLET ORAL at 21:25

## 2019-07-26 RX ADMIN — Medication 2.5 MILLIGRAM(S): at 02:28

## 2019-07-26 RX ADMIN — Medication 50 MILLIGRAM(S): at 05:46

## 2019-07-26 RX ADMIN — Medication 0.5 MILLIGRAM(S): at 17:40

## 2019-07-26 NOTE — PROGRESS NOTE ADULT - PROBLEM SELECTOR PLAN 2
low salt, low fat, low cholesterol   metoprolol - DASH diet   - BP remains slightly elevated, however patient tachycardic so will hold off further BP lowering agents  - C/w metoprolol

## 2019-07-26 NOTE — PROGRESS NOTE ADULT - PROBLEM SELECTOR PLAN 1
Ep consult called, started on amio load and Eliquis    - C/w metoprolol 50 bid, ASA  - TTE pending   -     ATTEND ADDENDUM   per ep will additionally load with amiodarone for more optimal rhythm control in addition to BB coverage for rate control  CHADS Vasc= 4; per EP will start AC given left-sided atrial t-cardia elevates stroke risk EP recs appreciated   - Started on amio 400mg BID and then switch to 200mg BID  - Started on Eliquis for AC given CHADSVASC = 4 and elevated stroke risk   - C/w metoprolol 50 bid, ASA  - TTE pending

## 2019-07-26 NOTE — CHART NOTE - NSCHARTNOTEFT_GEN_A_CORE
RN notified that patient's HR sustaining between 120s and 140s. Patient is asymptomatic and has no complaints. Earlier in the night, patient's HR would go up to 140s, but was not sustaining. It was mostly ranging between 100s-120s and patient received amio and metoprolol earlier.   Vitals:   T(C): 36.6 (07-26-19 @ 02:06), Max: 37.1 (07-25-19 @ 17:45)  HR: 125 (07-26-19 @ 02:06) (63 - 140)  BP: 140/90 (07-26-19 @ 02:06) (127/80 - 168/100)  RR: 19 (07-26-19 @ 02:06) (16 - 19)  SpO2: 100% (07-26-19 @ 02:06) (100% - 100%)  Discussed with cardiology, will give 2.5mg of IV lopressor and will continue to monitor.

## 2019-07-27 LAB
ALBUMIN SERPL ELPH-MCNC: 3.6 G/DL — SIGNIFICANT CHANGE UP (ref 3.3–5)
ALP SERPL-CCNC: 74 U/L — SIGNIFICANT CHANGE UP (ref 40–120)
ALT FLD-CCNC: 27 U/L — SIGNIFICANT CHANGE UP (ref 4–33)
ANION GAP SERPL CALC-SCNC: 11 MMO/L — SIGNIFICANT CHANGE UP (ref 7–14)
AST SERPL-CCNC: 18 U/L — SIGNIFICANT CHANGE UP (ref 4–32)
BILIRUB SERPL-MCNC: 0.7 MG/DL — SIGNIFICANT CHANGE UP (ref 0.2–1.2)
BUN SERPL-MCNC: 22 MG/DL — SIGNIFICANT CHANGE UP (ref 7–23)
CALCIUM SERPL-MCNC: 9.8 MG/DL — SIGNIFICANT CHANGE UP (ref 8.4–10.5)
CHLORIDE SERPL-SCNC: 102 MMOL/L — SIGNIFICANT CHANGE UP (ref 98–107)
CO2 SERPL-SCNC: 25 MMOL/L — SIGNIFICANT CHANGE UP (ref 22–31)
CREAT SERPL-MCNC: 1.24 MG/DL — SIGNIFICANT CHANGE UP (ref 0.5–1.3)
GLUCOSE SERPL-MCNC: 116 MG/DL — HIGH (ref 70–99)
HCT VFR BLD CALC: 35.5 % — SIGNIFICANT CHANGE UP (ref 34.5–45)
HGB BLD-MCNC: 11.2 G/DL — LOW (ref 11.5–15.5)
MAGNESIUM SERPL-MCNC: 2 MG/DL — SIGNIFICANT CHANGE UP (ref 1.6–2.6)
MCHC RBC-ENTMCNC: 31.4 PG — SIGNIFICANT CHANGE UP (ref 27–34)
MCHC RBC-ENTMCNC: 31.5 % — LOW (ref 32–36)
MCV RBC AUTO: 99.4 FL — SIGNIFICANT CHANGE UP (ref 80–100)
NRBC # FLD: 0 K/UL — SIGNIFICANT CHANGE UP (ref 0–0)
PHOSPHATE SERPL-MCNC: 3.2 MG/DL — SIGNIFICANT CHANGE UP (ref 2.5–4.5)
PLATELET # BLD AUTO: 235 K/UL — SIGNIFICANT CHANGE UP (ref 150–400)
PMV BLD: 9.8 FL — SIGNIFICANT CHANGE UP (ref 7–13)
POTASSIUM SERPL-MCNC: 4.2 MMOL/L — SIGNIFICANT CHANGE UP (ref 3.5–5.3)
POTASSIUM SERPL-SCNC: 4.2 MMOL/L — SIGNIFICANT CHANGE UP (ref 3.5–5.3)
PROT SERPL-MCNC: 7.3 G/DL — SIGNIFICANT CHANGE UP (ref 6–8.3)
RBC # BLD: 3.57 M/UL — LOW (ref 3.8–5.2)
RBC # FLD: 13 % — SIGNIFICANT CHANGE UP (ref 10.3–14.5)
SODIUM SERPL-SCNC: 138 MMOL/L — SIGNIFICANT CHANGE UP (ref 135–145)
WBC # BLD: 8.02 K/UL — SIGNIFICANT CHANGE UP (ref 3.8–10.5)
WBC # FLD AUTO: 8.02 K/UL — SIGNIFICANT CHANGE UP (ref 3.8–10.5)

## 2019-07-27 PROCEDURE — 99233 SBSQ HOSP IP/OBS HIGH 50: CPT

## 2019-07-27 RX ORDER — HALOPERIDOL DECANOATE 100 MG/ML
0.5 INJECTION INTRAMUSCULAR ONCE
Refills: 0 | Status: DISCONTINUED | OUTPATIENT
Start: 2019-07-27 | End: 2019-07-27

## 2019-07-27 RX ORDER — LANOLIN ALCOHOL/MO/W.PET/CERES
3 CREAM (GRAM) TOPICAL AT BEDTIME
Refills: 0 | Status: DISCONTINUED | OUTPATIENT
Start: 2019-07-27 | End: 2019-07-31

## 2019-07-27 RX ORDER — ZALEPLON 10 MG
5 CAPSULE ORAL ONCE
Refills: 0 | Status: DISCONTINUED | OUTPATIENT
Start: 2019-07-27 | End: 2019-07-27

## 2019-07-27 RX ADMIN — Medication 50 MILLIGRAM(S): at 18:03

## 2019-07-27 RX ADMIN — ATORVASTATIN CALCIUM 10 MILLIGRAM(S): 80 TABLET, FILM COATED ORAL at 21:16

## 2019-07-27 RX ADMIN — Medication 81 MILLIGRAM(S): at 11:39

## 2019-07-27 RX ADMIN — RISPERIDONE 0.5 MILLIGRAM(S): 4 TABLET ORAL at 21:16

## 2019-07-27 RX ADMIN — Medication 3 MILLIGRAM(S): at 21:16

## 2019-07-27 RX ADMIN — AMIODARONE HYDROCHLORIDE 400 MILLIGRAM(S): 400 TABLET ORAL at 18:03

## 2019-07-27 RX ADMIN — Medication 50 MILLIGRAM(S): at 05:23

## 2019-07-27 RX ADMIN — APIXABAN 2.5 MILLIGRAM(S): 2.5 TABLET, FILM COATED ORAL at 18:03

## 2019-07-27 RX ADMIN — Medication 0.5 MILLIGRAM(S): at 21:16

## 2019-07-27 RX ADMIN — AMIODARONE HYDROCHLORIDE 400 MILLIGRAM(S): 400 TABLET ORAL at 05:23

## 2019-07-27 RX ADMIN — APIXABAN 2.5 MILLIGRAM(S): 2.5 TABLET, FILM COATED ORAL at 05:23

## 2019-07-27 RX ADMIN — Medication 1 TABLET(S): at 11:39

## 2019-07-27 NOTE — PROGRESS NOTE ADULT - PROBLEM SELECTOR PLAN 1
EP recs appreciated   - Started on amio 400mg BID and then switch to 200mg BID  - Started on Eliquis for AC given CHADSVASC = 4 and elevated stroke risk   - C/w metoprolol 50 bid, ASA  - TTE pending

## 2019-07-27 NOTE — PHYSICAL THERAPY INITIAL EVALUATION ADULT - PERTINENT HX OF CURRENT PROBLEM, REHAB EVAL
Patient is 89 year old female admitted with history of HTN, delusional disorder, presents for AFIB, RVR.

## 2019-07-27 NOTE — PROGRESS NOTE ADULT - PROBLEM SELECTOR PLAN 2
- DASH diet   - BP remains slightly elevated, however patient tachycardic so will hold off further BP lowering agents  - C/w metoprolol

## 2019-07-28 LAB
ANION GAP SERPL CALC-SCNC: 11 MMO/L — SIGNIFICANT CHANGE UP (ref 7–14)
BUN SERPL-MCNC: 24 MG/DL — HIGH (ref 7–23)
CALCIUM SERPL-MCNC: 9.5 MG/DL — SIGNIFICANT CHANGE UP (ref 8.4–10.5)
CHLORIDE SERPL-SCNC: 102 MMOL/L — SIGNIFICANT CHANGE UP (ref 98–107)
CO2 SERPL-SCNC: 27 MMOL/L — SIGNIFICANT CHANGE UP (ref 22–31)
CREAT SERPL-MCNC: 1.37 MG/DL — HIGH (ref 0.5–1.3)
GLUCOSE SERPL-MCNC: 97 MG/DL — SIGNIFICANT CHANGE UP (ref 70–99)
HCT VFR BLD CALC: 34.9 % — SIGNIFICANT CHANGE UP (ref 34.5–45)
HGB BLD-MCNC: 11 G/DL — LOW (ref 11.5–15.5)
MAGNESIUM SERPL-MCNC: 2 MG/DL — SIGNIFICANT CHANGE UP (ref 1.6–2.6)
MCHC RBC-ENTMCNC: 31.5 % — LOW (ref 32–36)
MCHC RBC-ENTMCNC: 32 PG — SIGNIFICANT CHANGE UP (ref 27–34)
MCV RBC AUTO: 101.5 FL — HIGH (ref 80–100)
NRBC # FLD: 0 K/UL — SIGNIFICANT CHANGE UP (ref 0–0)
PLATELET # BLD AUTO: 256 K/UL — SIGNIFICANT CHANGE UP (ref 150–400)
PMV BLD: 9.9 FL — SIGNIFICANT CHANGE UP (ref 7–13)
POTASSIUM SERPL-MCNC: 4.2 MMOL/L — SIGNIFICANT CHANGE UP (ref 3.5–5.3)
POTASSIUM SERPL-SCNC: 4.2 MMOL/L — SIGNIFICANT CHANGE UP (ref 3.5–5.3)
RBC # BLD: 3.44 M/UL — LOW (ref 3.8–5.2)
RBC # FLD: 13.2 % — SIGNIFICANT CHANGE UP (ref 10.3–14.5)
SODIUM SERPL-SCNC: 140 MMOL/L — SIGNIFICANT CHANGE UP (ref 135–145)
WBC # BLD: 7.28 K/UL — SIGNIFICANT CHANGE UP (ref 3.8–10.5)
WBC # FLD AUTO: 7.28 K/UL — SIGNIFICANT CHANGE UP (ref 3.8–10.5)

## 2019-07-28 PROCEDURE — 99233 SBSQ HOSP IP/OBS HIGH 50: CPT

## 2019-07-28 RX ORDER — HYDRALAZINE HCL 50 MG
5 TABLET ORAL ONCE
Refills: 0 | Status: COMPLETED | OUTPATIENT
Start: 2019-07-28 | End: 2019-07-28

## 2019-07-28 RX ORDER — APIXABAN 2.5 MG/1
1 TABLET, FILM COATED ORAL
Qty: 60 | Refills: 0
Start: 2019-07-28 | End: 2019-08-26

## 2019-07-28 RX ORDER — HYDROCHLOROTHIAZIDE 25 MG
12.5 TABLET ORAL DAILY
Refills: 0 | Status: DISCONTINUED | OUTPATIENT
Start: 2019-07-28 | End: 2019-07-29

## 2019-07-28 RX ADMIN — Medication 50 MILLIGRAM(S): at 05:48

## 2019-07-28 RX ADMIN — AMIODARONE HYDROCHLORIDE 400 MILLIGRAM(S): 400 TABLET ORAL at 17:08

## 2019-07-28 RX ADMIN — Medication 0.5 MILLIGRAM(S): at 21:05

## 2019-07-28 RX ADMIN — Medication 81 MILLIGRAM(S): at 11:18

## 2019-07-28 RX ADMIN — RISPERIDONE 0.5 MILLIGRAM(S): 4 TABLET ORAL at 21:05

## 2019-07-28 RX ADMIN — Medication 3 MILLIGRAM(S): at 21:05

## 2019-07-28 RX ADMIN — AMIODARONE HYDROCHLORIDE 400 MILLIGRAM(S): 400 TABLET ORAL at 05:48

## 2019-07-28 RX ADMIN — APIXABAN 2.5 MILLIGRAM(S): 2.5 TABLET, FILM COATED ORAL at 17:08

## 2019-07-28 RX ADMIN — ATORVASTATIN CALCIUM 10 MILLIGRAM(S): 80 TABLET, FILM COATED ORAL at 21:05

## 2019-07-28 RX ADMIN — Medication 50 MILLIGRAM(S): at 17:08

## 2019-07-28 RX ADMIN — Medication 5 MILLIGRAM(S): at 22:27

## 2019-07-28 RX ADMIN — Medication 12.5 MILLIGRAM(S): at 08:32

## 2019-07-28 RX ADMIN — APIXABAN 2.5 MILLIGRAM(S): 2.5 TABLET, FILM COATED ORAL at 05:48

## 2019-07-28 RX ADMIN — Medication 1 TABLET(S): at 11:18

## 2019-07-28 NOTE — PROGRESS NOTE ADULT - PROBLEM SELECTOR PLAN 1
EP recs appreciated   - Started on amio 400mg BID and then switch to 200mg BID  - Started on Eliquis for AC given CHADSVASC = 4 and elevated stroke risk   - C/w metoprolol 50 bid, ASA  - TTE notable for severely dilated LA and moderate MR

## 2019-07-28 NOTE — PROGRESS NOTE ADULT - PROBLEM SELECTOR PLAN 2
- DASH diet   - BP, especially diastolic remains elevated - started on HCTZ 12.5mg today   - Will continue to monitor   - C/w metoprolol

## 2019-07-29 LAB
ANION GAP SERPL CALC-SCNC: 13 MMO/L — SIGNIFICANT CHANGE UP (ref 7–14)
BUN SERPL-MCNC: 28 MG/DL — HIGH (ref 7–23)
CALCIUM SERPL-MCNC: 9.7 MG/DL — SIGNIFICANT CHANGE UP (ref 8.4–10.5)
CHLORIDE SERPL-SCNC: 101 MMOL/L — SIGNIFICANT CHANGE UP (ref 98–107)
CO2 SERPL-SCNC: 26 MMOL/L — SIGNIFICANT CHANGE UP (ref 22–31)
CREAT SERPL-MCNC: 1.4 MG/DL — HIGH (ref 0.5–1.3)
GLUCOSE SERPL-MCNC: 112 MG/DL — HIGH (ref 70–99)
HCT VFR BLD CALC: 37 % — SIGNIFICANT CHANGE UP (ref 34.5–45)
HGB BLD-MCNC: 11.8 G/DL — SIGNIFICANT CHANGE UP (ref 11.5–15.5)
MAGNESIUM SERPL-MCNC: 2 MG/DL — SIGNIFICANT CHANGE UP (ref 1.6–2.6)
MCHC RBC-ENTMCNC: 31.9 % — LOW (ref 32–36)
MCHC RBC-ENTMCNC: 32.1 PG — SIGNIFICANT CHANGE UP (ref 27–34)
MCV RBC AUTO: 100.5 FL — HIGH (ref 80–100)
NRBC # FLD: 0 K/UL — SIGNIFICANT CHANGE UP (ref 0–0)
PLATELET # BLD AUTO: 265 K/UL — SIGNIFICANT CHANGE UP (ref 150–400)
PMV BLD: 9.7 FL — SIGNIFICANT CHANGE UP (ref 7–13)
POTASSIUM SERPL-MCNC: 4 MMOL/L — SIGNIFICANT CHANGE UP (ref 3.5–5.3)
POTASSIUM SERPL-SCNC: 4 MMOL/L — SIGNIFICANT CHANGE UP (ref 3.5–5.3)
RBC # BLD: 3.68 M/UL — LOW (ref 3.8–5.2)
RBC # FLD: 13.2 % — SIGNIFICANT CHANGE UP (ref 10.3–14.5)
SODIUM SERPL-SCNC: 140 MMOL/L — SIGNIFICANT CHANGE UP (ref 135–145)
WBC # BLD: 7.41 K/UL — SIGNIFICANT CHANGE UP (ref 3.8–10.5)
WBC # FLD AUTO: 7.41 K/UL — SIGNIFICANT CHANGE UP (ref 3.8–10.5)

## 2019-07-29 PROCEDURE — 99233 SBSQ HOSP IP/OBS HIGH 50: CPT

## 2019-07-29 PROCEDURE — 99232 SBSQ HOSP IP/OBS MODERATE 35: CPT

## 2019-07-29 RX ORDER — SENNA PLUS 8.6 MG/1
2 TABLET ORAL AT BEDTIME
Refills: 0 | Status: DISCONTINUED | OUTPATIENT
Start: 2019-07-29 | End: 2019-07-31

## 2019-07-29 RX ORDER — DOCUSATE SODIUM 100 MG
100 CAPSULE ORAL
Refills: 0 | Status: DISCONTINUED | OUTPATIENT
Start: 2019-07-29 | End: 2019-07-31

## 2019-07-29 RX ORDER — SODIUM CHLORIDE 9 MG/ML
1000 INJECTION INTRAMUSCULAR; INTRAVENOUS; SUBCUTANEOUS
Refills: 0 | Status: DISCONTINUED | OUTPATIENT
Start: 2019-07-29 | End: 2019-07-30

## 2019-07-29 RX ORDER — AMLODIPINE BESYLATE 2.5 MG/1
2.5 TABLET ORAL DAILY
Refills: 0 | Status: DISCONTINUED | OUTPATIENT
Start: 2019-07-29 | End: 2019-07-31

## 2019-07-29 RX ADMIN — SODIUM CHLORIDE 50 MILLILITER(S): 9 INJECTION INTRAMUSCULAR; INTRAVENOUS; SUBCUTANEOUS at 10:42

## 2019-07-29 RX ADMIN — Medication 100 MILLIGRAM(S): at 17:34

## 2019-07-29 RX ADMIN — AMIODARONE HYDROCHLORIDE 400 MILLIGRAM(S): 400 TABLET ORAL at 17:34

## 2019-07-29 RX ADMIN — Medication 12.5 MILLIGRAM(S): at 05:41

## 2019-07-29 RX ADMIN — Medication 81 MILLIGRAM(S): at 17:34

## 2019-07-29 RX ADMIN — Medication 50 MILLIGRAM(S): at 05:41

## 2019-07-29 RX ADMIN — APIXABAN 2.5 MILLIGRAM(S): 2.5 TABLET, FILM COATED ORAL at 17:34

## 2019-07-29 RX ADMIN — APIXABAN 2.5 MILLIGRAM(S): 2.5 TABLET, FILM COATED ORAL at 05:41

## 2019-07-29 RX ADMIN — Medication 3 MILLIGRAM(S): at 21:22

## 2019-07-29 RX ADMIN — Medication 50 MILLIGRAM(S): at 17:34

## 2019-07-29 RX ADMIN — Medication 0.5 MILLIGRAM(S): at 21:22

## 2019-07-29 RX ADMIN — ATORVASTATIN CALCIUM 10 MILLIGRAM(S): 80 TABLET, FILM COATED ORAL at 21:22

## 2019-07-29 RX ADMIN — SENNA PLUS 2 TABLET(S): 8.6 TABLET ORAL at 21:22

## 2019-07-29 RX ADMIN — Medication 1 TABLET(S): at 17:34

## 2019-07-29 RX ADMIN — AMIODARONE HYDROCHLORIDE 400 MILLIGRAM(S): 400 TABLET ORAL at 05:41

## 2019-07-29 RX ADMIN — RISPERIDONE 0.5 MILLIGRAM(S): 4 TABLET ORAL at 21:22

## 2019-07-29 NOTE — PROGRESS NOTE ADULT - PROBLEM SELECTOR PLAN 2
Creat 1.4, baseline 1.2, eGFR 33. etiology is not clear, ? due to Amiodarone, HCTZ or dehydration.  -Gentle hydration with NS at 50cc/h X 24h  -D/C HCTZ  -F/U EPS rec re: adjustment of Amiodarone  -monitor renal function in AM  -possible d/c in Am if renal function improves.

## 2019-07-29 NOTE — PROGRESS NOTE ADULT - PROBLEM SELECTOR PLAN 3
- DASH diet   - BP, especially diastolic remains elevated on Metoprolol  -D/C HCTZ  -Add amlodipine 2.5 mg daily    - Will continue to monitor   - C/w metoprolol

## 2019-07-30 PROBLEM — Z87.81 PERSONAL HISTORY OF (HEALED) TRAUMATIC FRACTURE: Chronic | Status: ACTIVE | Noted: 2019-07-25

## 2019-07-30 PROBLEM — I47.1 SUPRAVENTRICULAR TACHYCARDIA: Chronic | Status: ACTIVE | Noted: 2019-07-25

## 2019-07-30 LAB
ANION GAP SERPL CALC-SCNC: 8 MMO/L — SIGNIFICANT CHANGE UP (ref 7–14)
BASOPHILS # BLD AUTO: 0.01 K/UL — SIGNIFICANT CHANGE UP (ref 0–0.2)
BASOPHILS NFR BLD AUTO: 0.1 % — SIGNIFICANT CHANGE UP (ref 0–2)
BUN SERPL-MCNC: 25 MG/DL — HIGH (ref 7–23)
CALCIUM SERPL-MCNC: 8.9 MG/DL — SIGNIFICANT CHANGE UP (ref 8.4–10.5)
CHLORIDE SERPL-SCNC: 105 MMOL/L — SIGNIFICANT CHANGE UP (ref 98–107)
CO2 SERPL-SCNC: 26 MMOL/L — SIGNIFICANT CHANGE UP (ref 22–31)
CREAT SERPL-MCNC: 1.47 MG/DL — HIGH (ref 0.5–1.3)
EOSINOPHIL # BLD AUTO: 0.05 K/UL — SIGNIFICANT CHANGE UP (ref 0–0.5)
EOSINOPHIL NFR BLD AUTO: 0.7 % — SIGNIFICANT CHANGE UP (ref 0–6)
GLUCOSE SERPL-MCNC: 98 MG/DL — SIGNIFICANT CHANGE UP (ref 70–99)
HCT VFR BLD CALC: 31.4 % — LOW (ref 34.5–45)
HGB BLD-MCNC: 9.8 G/DL — LOW (ref 11.5–15.5)
IMM GRANULOCYTES NFR BLD AUTO: 0.5 % — SIGNIFICANT CHANGE UP (ref 0–1.5)
LYMPHOCYTES # BLD AUTO: 1.07 K/UL — SIGNIFICANT CHANGE UP (ref 1–3.3)
LYMPHOCYTES # BLD AUTO: 14.4 % — SIGNIFICANT CHANGE UP (ref 13–44)
MAGNESIUM SERPL-MCNC: 1.9 MG/DL — SIGNIFICANT CHANGE UP (ref 1.6–2.6)
MCHC RBC-ENTMCNC: 31.2 % — LOW (ref 32–36)
MCHC RBC-ENTMCNC: 31.7 PG — SIGNIFICANT CHANGE UP (ref 27–34)
MCV RBC AUTO: 101.6 FL — HIGH (ref 80–100)
MONOCYTES # BLD AUTO: 0.51 K/UL — SIGNIFICANT CHANGE UP (ref 0–0.9)
MONOCYTES NFR BLD AUTO: 6.8 % — SIGNIFICANT CHANGE UP (ref 2–14)
NEUTROPHILS # BLD AUTO: 5.77 K/UL — SIGNIFICANT CHANGE UP (ref 1.8–7.4)
NEUTROPHILS NFR BLD AUTO: 77.5 % — HIGH (ref 43–77)
NRBC # FLD: 0 K/UL — SIGNIFICANT CHANGE UP (ref 0–0)
PLATELET # BLD AUTO: 252 K/UL — SIGNIFICANT CHANGE UP (ref 150–400)
PMV BLD: 9.8 FL — SIGNIFICANT CHANGE UP (ref 7–13)
POTASSIUM SERPL-MCNC: 3.9 MMOL/L — SIGNIFICANT CHANGE UP (ref 3.5–5.3)
POTASSIUM SERPL-SCNC: 3.9 MMOL/L — SIGNIFICANT CHANGE UP (ref 3.5–5.3)
RBC # BLD: 3.09 M/UL — LOW (ref 3.8–5.2)
RBC # FLD: 13.4 % — SIGNIFICANT CHANGE UP (ref 10.3–14.5)
SODIUM SERPL-SCNC: 139 MMOL/L — SIGNIFICANT CHANGE UP (ref 135–145)
WBC # BLD: 7.45 K/UL — SIGNIFICANT CHANGE UP (ref 3.8–10.5)
WBC # FLD AUTO: 7.45 K/UL — SIGNIFICANT CHANGE UP (ref 3.8–10.5)

## 2019-07-30 PROCEDURE — 99233 SBSQ HOSP IP/OBS HIGH 50: CPT

## 2019-07-30 PROCEDURE — 99232 SBSQ HOSP IP/OBS MODERATE 35: CPT

## 2019-07-30 RX ORDER — AMIODARONE HYDROCHLORIDE 400 MG/1
200 TABLET ORAL DAILY
Refills: 0 | Status: DISCONTINUED | OUTPATIENT
Start: 2019-07-31 | End: 2019-07-31

## 2019-07-30 RX ADMIN — Medication 81 MILLIGRAM(S): at 17:58

## 2019-07-30 RX ADMIN — ATORVASTATIN CALCIUM 10 MILLIGRAM(S): 80 TABLET, FILM COATED ORAL at 21:17

## 2019-07-30 RX ADMIN — APIXABAN 2.5 MILLIGRAM(S): 2.5 TABLET, FILM COATED ORAL at 17:57

## 2019-07-30 RX ADMIN — SODIUM CHLORIDE 50 MILLILITER(S): 9 INJECTION INTRAMUSCULAR; INTRAVENOUS; SUBCUTANEOUS at 07:35

## 2019-07-30 RX ADMIN — Medication 1 TABLET(S): at 17:57

## 2019-07-30 RX ADMIN — Medication 50 MILLIGRAM(S): at 07:34

## 2019-07-30 RX ADMIN — Medication 3 MILLIGRAM(S): at 21:16

## 2019-07-30 RX ADMIN — RISPERIDONE 0.5 MILLIGRAM(S): 4 TABLET ORAL at 21:16

## 2019-07-30 RX ADMIN — AMLODIPINE BESYLATE 2.5 MILLIGRAM(S): 2.5 TABLET ORAL at 07:34

## 2019-07-30 RX ADMIN — Medication 0.5 MILLIGRAM(S): at 21:16

## 2019-07-30 RX ADMIN — Medication 100 MILLIGRAM(S): at 06:26

## 2019-07-30 RX ADMIN — SENNA PLUS 2 TABLET(S): 8.6 TABLET ORAL at 21:16

## 2019-07-30 RX ADMIN — AMIODARONE HYDROCHLORIDE 400 MILLIGRAM(S): 400 TABLET ORAL at 06:26

## 2019-07-30 RX ADMIN — Medication 50 MILLIGRAM(S): at 17:57

## 2019-07-30 RX ADMIN — APIXABAN 2.5 MILLIGRAM(S): 2.5 TABLET, FILM COATED ORAL at 06:26

## 2019-07-30 NOTE — PROGRESS NOTE ADULT - PROBLEM SELECTOR PLAN 2
Creat 1.47 today, baseline 1.2, eGFR 31. etiology is not clear, not responding to IVF. ? due to Amiodarone,   -F/U EPS rec re: adjustment of Amiodarone  -monitor renal function in AM  -possible d/c in Am if renal function improves.

## 2019-07-30 NOTE — PROGRESS NOTE ADULT - PROBLEM SELECTOR PLAN 3
- DASH diet   - BP  remains elevated on Metoprolol and Amlodipine   - Will continue to monitor and titrate up amlodipine   - C/w metoprolol

## 2019-07-31 ENCOUNTER — TRANSCRIPTION ENCOUNTER (OUTPATIENT)
Age: 84
End: 2019-07-31

## 2019-07-31 VITALS
OXYGEN SATURATION: 100 % | DIASTOLIC BLOOD PRESSURE: 83 MMHG | HEART RATE: 63 BPM | SYSTOLIC BLOOD PRESSURE: 150 MMHG | TEMPERATURE: 98 F | RESPIRATION RATE: 17 BRPM

## 2019-07-31 LAB
ANION GAP SERPL CALC-SCNC: 12 MMO/L — SIGNIFICANT CHANGE UP (ref 7–14)
BASOPHILS # BLD AUTO: 0.02 K/UL — SIGNIFICANT CHANGE UP (ref 0–0.2)
BASOPHILS NFR BLD AUTO: 0.3 % — SIGNIFICANT CHANGE UP (ref 0–2)
BUN SERPL-MCNC: 23 MG/DL — SIGNIFICANT CHANGE UP (ref 7–23)
CALCIUM SERPL-MCNC: 9.5 MG/DL — SIGNIFICANT CHANGE UP (ref 8.4–10.5)
CHLORIDE SERPL-SCNC: 104 MMOL/L — SIGNIFICANT CHANGE UP (ref 98–107)
CO2 SERPL-SCNC: 26 MMOL/L — SIGNIFICANT CHANGE UP (ref 22–31)
CREAT SERPL-MCNC: 1.32 MG/DL — HIGH (ref 0.5–1.3)
EOSINOPHIL # BLD AUTO: 0.06 K/UL — SIGNIFICANT CHANGE UP (ref 0–0.5)
EOSINOPHIL NFR BLD AUTO: 0.8 % — SIGNIFICANT CHANGE UP (ref 0–6)
GLUCOSE SERPL-MCNC: 89 MG/DL — SIGNIFICANT CHANGE UP (ref 70–99)
HCT VFR BLD CALC: 35.1 % — SIGNIFICANT CHANGE UP (ref 34.5–45)
HGB BLD-MCNC: 11 G/DL — LOW (ref 11.5–15.5)
IMM GRANULOCYTES NFR BLD AUTO: 0.5 % — SIGNIFICANT CHANGE UP (ref 0–1.5)
LYMPHOCYTES # BLD AUTO: 1.11 K/UL — SIGNIFICANT CHANGE UP (ref 1–3.3)
LYMPHOCYTES # BLD AUTO: 14.2 % — SIGNIFICANT CHANGE UP (ref 13–44)
MAGNESIUM SERPL-MCNC: 2 MG/DL — SIGNIFICANT CHANGE UP (ref 1.6–2.6)
MCHC RBC-ENTMCNC: 31.2 PG — SIGNIFICANT CHANGE UP (ref 27–34)
MCHC RBC-ENTMCNC: 31.3 % — LOW (ref 32–36)
MCV RBC AUTO: 99.4 FL — SIGNIFICANT CHANGE UP (ref 80–100)
MONOCYTES # BLD AUTO: 0.64 K/UL — SIGNIFICANT CHANGE UP (ref 0–0.9)
MONOCYTES NFR BLD AUTO: 8.2 % — SIGNIFICANT CHANGE UP (ref 2–14)
NEUTROPHILS # BLD AUTO: 5.95 K/UL — SIGNIFICANT CHANGE UP (ref 1.8–7.4)
NEUTROPHILS NFR BLD AUTO: 76 % — SIGNIFICANT CHANGE UP (ref 43–77)
NRBC # FLD: 0 K/UL — SIGNIFICANT CHANGE UP (ref 0–0)
PLATELET # BLD AUTO: 255 K/UL — SIGNIFICANT CHANGE UP (ref 150–400)
PMV BLD: 9.6 FL — SIGNIFICANT CHANGE UP (ref 7–13)
POTASSIUM SERPL-MCNC: 3.8 MMOL/L — SIGNIFICANT CHANGE UP (ref 3.5–5.3)
POTASSIUM SERPL-SCNC: 3.8 MMOL/L — SIGNIFICANT CHANGE UP (ref 3.5–5.3)
RBC # BLD: 3.53 M/UL — LOW (ref 3.8–5.2)
RBC # FLD: 13.3 % — SIGNIFICANT CHANGE UP (ref 10.3–14.5)
SODIUM SERPL-SCNC: 142 MMOL/L — SIGNIFICANT CHANGE UP (ref 135–145)
WBC # BLD: 7.82 K/UL — SIGNIFICANT CHANGE UP (ref 3.8–10.5)
WBC # FLD AUTO: 7.82 K/UL — SIGNIFICANT CHANGE UP (ref 3.8–10.5)

## 2019-07-31 PROCEDURE — 99239 HOSP IP/OBS DSCHRG MGMT >30: CPT

## 2019-07-31 PROCEDURE — 99232 SBSQ HOSP IP/OBS MODERATE 35: CPT

## 2019-07-31 RX ORDER — AMLODIPINE BESYLATE 2.5 MG/1
1 TABLET ORAL
Qty: 30 | Refills: 0
Start: 2019-07-31 | End: 2019-08-29

## 2019-07-31 RX ORDER — APIXABAN 2.5 MG/1
1 TABLET, FILM COATED ORAL
Qty: 60 | Refills: 0
Start: 2019-07-31 | End: 2019-08-29

## 2019-07-31 RX ORDER — DOCUSATE SODIUM 100 MG
1 CAPSULE ORAL
Qty: 60 | Refills: 0
Start: 2019-07-31 | End: 2019-08-29

## 2019-07-31 RX ORDER — ATORVASTATIN CALCIUM 80 MG/1
1 TABLET, FILM COATED ORAL
Qty: 0 | Refills: 0 | DISCHARGE
Start: 2019-07-31

## 2019-07-31 RX ORDER — SENNA PLUS 8.6 MG/1
2 TABLET ORAL
Qty: 60 | Refills: 0
Start: 2019-07-31 | End: 2019-08-29

## 2019-07-31 RX ORDER — LANOLIN ALCOHOL/MO/W.PET/CERES
1 CREAM (GRAM) TOPICAL
Qty: 30 | Refills: 0
Start: 2019-07-31 | End: 2019-08-29

## 2019-07-31 RX ORDER — AMLODIPINE BESYLATE 2.5 MG/1
1 TABLET ORAL
Qty: 0 | Refills: 0 | DISCHARGE

## 2019-07-31 RX ORDER — ATORVASTATIN CALCIUM 80 MG/1
1 TABLET, FILM COATED ORAL
Qty: 30 | Refills: 0
Start: 2019-07-31 | End: 2019-08-29

## 2019-07-31 RX ORDER — AMIODARONE HYDROCHLORIDE 400 MG/1
1 TABLET ORAL
Qty: 0 | Refills: 0 | DISCHARGE
Start: 2019-07-31

## 2019-07-31 RX ORDER — LANOLIN ALCOHOL/MO/W.PET/CERES
1 CREAM (GRAM) TOPICAL
Qty: 0 | Refills: 0 | DISCHARGE
Start: 2019-07-31

## 2019-07-31 RX ORDER — ATORVASTATIN CALCIUM 80 MG/1
1 TABLET, FILM COATED ORAL
Qty: 0 | Refills: 0 | DISCHARGE

## 2019-07-31 RX ORDER — RISPERIDONE 4 MG/1
1 TABLET ORAL
Qty: 0 | Refills: 0 | DISCHARGE

## 2019-07-31 RX ORDER — METOPROLOL TARTRATE 50 MG
1 TABLET ORAL
Qty: 0 | Refills: 0 | DISCHARGE

## 2019-07-31 RX ORDER — RISPERIDONE 4 MG/1
1 TABLET ORAL
Qty: 0 | Refills: 0 | DISCHARGE
Start: 2019-07-31

## 2019-07-31 RX ORDER — SENNA PLUS 8.6 MG/1
2 TABLET ORAL
Qty: 0 | Refills: 0 | DISCHARGE
Start: 2019-07-31

## 2019-07-31 RX ORDER — METOPROLOL TARTRATE 50 MG
1 TABLET ORAL
Qty: 0 | Refills: 0 | DISCHARGE
Start: 2019-07-31

## 2019-07-31 RX ORDER — ASPIRIN/CALCIUM CARB/MAGNESIUM 324 MG
1 TABLET ORAL
Qty: 30 | Refills: 0
Start: 2019-07-31 | End: 2019-08-29

## 2019-07-31 RX ORDER — METOPROLOL TARTRATE 50 MG
1 TABLET ORAL
Qty: 60 | Refills: 0
Start: 2019-07-31 | End: 2019-08-29

## 2019-07-31 RX ORDER — AMIODARONE HYDROCHLORIDE 400 MG/1
1 TABLET ORAL
Qty: 30 | Refills: 0
Start: 2019-07-31 | End: 2019-08-29

## 2019-07-31 RX ORDER — DOCUSATE SODIUM 100 MG
1 CAPSULE ORAL
Qty: 0 | Refills: 0 | DISCHARGE
Start: 2019-07-31

## 2019-07-31 RX ORDER — APIXABAN 2.5 MG/1
1 TABLET, FILM COATED ORAL
Qty: 0 | Refills: 0 | DISCHARGE
Start: 2019-07-31

## 2019-07-31 RX ADMIN — AMIODARONE HYDROCHLORIDE 200 MILLIGRAM(S): 400 TABLET ORAL at 05:37

## 2019-07-31 RX ADMIN — AMLODIPINE BESYLATE 2.5 MILLIGRAM(S): 2.5 TABLET ORAL at 05:38

## 2019-07-31 RX ADMIN — Medication 100 MILLIGRAM(S): at 05:38

## 2019-07-31 RX ADMIN — Medication 50 MILLIGRAM(S): at 05:38

## 2019-07-31 RX ADMIN — APIXABAN 2.5 MILLIGRAM(S): 2.5 TABLET, FILM COATED ORAL at 05:38

## 2019-07-31 NOTE — PROGRESS NOTE ADULT - ASSESSMENT
89 yo F HTN, HLD, atrial tachycardia (most likely left side ) and delusional disorder sent by PMD for asymmptomatic atrial tachycardia. Patient was being maintained on metoprolol. Now on Amiodarone maintenance dose. Creatinine trending down.  - Continue  metoprolol 50mg BID   - Continue Amiodarone to 200mg daily  - Continue anticoagulation therapy Eliquis 2.5 mg BID for  [id number]  4  - Check TFTs and LFTs Q3-6 months, PFT yearly while on Amiodarone therapy  - Discussed with Dr. toledo  - F/U with Dr. Toledo on 9/5/19 @1:30pm
89F HTN, HLD, atrial tachycardia and delusional disorder sent by PMD for asymptomatic atrial tachycardia.
89F HTN, HLD, atrial tachycardia and delusional disorder sent by PMD for asymptomatic atrial tachycardia.
89 yo F HTN, HLD, atrial tachycardia (most likely left side ) and delusional disorder sent by PMD for asymmptomatic atrial tachycardia. Patient was being maintained on metoprolol.  - Continue home dose of metoprolol 50mg BID   -Continue Amiodarone load 400mg BID and then change to 200mg daily  -Continue anticoagulation therapy Eliquis 2.5 mg BID for  [id number]  4  - Check TFTs and LFTs Q3-6 months, PFT yearly while on Amiodarone therapy
89 yo F HTN, HLD, atrial tachycardia (most likely left side ) and delusional disorder sent by PMD for asymmptomatic atrial tachycardia. Patient was being maintained on metoprolol. Now on Amiodarone load. Creatinine up to 1.47 today  - Continue  metoprolol 50mg BID   - Change Amiodarone to 200mg daily  - Continue anticoagulation therapy Eliquis 2.5 mg BID for  [id number]  4  - Check TFTs and LFTs Q3-6 months, PFT yearly while on Amiodarone therapy  - Discussed with Dr. toledo
89F HTN, HLD, atrial tachycardia and delusional disorder sent by PMD for asymptomatic atrial tachycardia.
89 yo F HTN, HLD, atrial tachycardia (most likely left side ) and delusional disorder sent by PMD for asymmptomatic atrial tachycardia. Patient was being maintained on metoprolol.  - Continue  metoprolol 50mg BID   -Continue Amiodarone load 400mg BID and then change to 200mg daily  - Continue anticoagulation therapy Eliquis 2.5 mg BID for  [id number]  4  - Check TFTs and LFTs Q3-6 months, PFT yearly while on Amiodarone therapy
89F HTN, HLD, atrial tachycardia and delusional disorder sent by PMD for asymptomatic atrial tachycardia.

## 2019-07-31 NOTE — DISCHARGE NOTE NURSING/CASE MANAGEMENT/SOCIAL WORK - NSDCDPATPORTLINK_GEN_ALL_CORE
You can access the [facility], offered by [facility], by registering with the following website: [email or web address]

## 2019-07-31 NOTE — PROGRESS NOTE ADULT - PROVIDER SPECIALTY LIST ADULT
Electrophysiology
Hospitalist
Electrophysiology
Hospitalist
Hospitalist

## 2019-07-31 NOTE — DISCHARGE NOTE PROVIDER - CARE PROVIDERS DIRECT ADDRESSES
[email or web address]

## 2019-07-31 NOTE — PROGRESS NOTE ADULT - ATTENDING COMMENTS
Spoke to Son Rupert on the phone at length about f/u issues. He understood and agreed with the plan.  Total time: 35min
Dispo planning for tomorrow pending EP recs. PT recommends home with home PT

## 2019-07-31 NOTE — PROGRESS NOTE ADULT - PROBLEM SELECTOR PROBLEM 4
Hyperlipidemia
Need for prophylactic measure

## 2019-07-31 NOTE — DISCHARGE NOTE PROVIDER - NSDCFUSCHEDAPPT_GEN_ALL_CORE_FT
CONNER CELESTE ; 08/09/2019 ; NPP Med GenInt [address] [facility]  CONNER CELESTE ; 09/04/2019 ; NPP Ophthal [address]  CONNER ECLESTE ; 09/05/2019 ; NPP Cardio Electro [address] CONNER CELESTE ; 08/09/2019 ; NPP Med GenInt [address] [facility]  CONNER CELESTE ; 09/04/2019 ; NPP Ophthal [address] [address] [facility] [address]  CONNER CELESTE ; 09/05/2019 ; NPP Cardio Electro [address]

## 2019-07-31 NOTE — DISCHARGE NOTE PROVIDER - HOSPITAL COURSE
89 y/o Female, with a PmHx of HTN, chol, delusional disorder, sent by PMD for afib w/RVR. PT was found to be in narrow complex tachycardia @ 135 bpm (due to afib vs SVT vs 2:1 a flutter). PT without any complaints. Denies chest pain, SOB, KNAPP, PND, orthopnea, palpitations, diaphoresis, lightheadedness, dizziness, syncope, increased lower extremity edema, fever chills, malaise, myalgias, anorexia, weight changes ( loss or gain), night sweats, generalized fatigue abdominal pain, N/V/C/D BRBPR, melena, urinary symptoms, cough, and wheezing. Admitted to telemetry for medical management.        On admission:        1. Left atrial tachycardia/AFlutter -    hstrop: 16    CHADSVASC: 4    Continue metoprolol, asa    House EP following started amiodarone load for atrial tachycardia/atrial flutter    Started eliquis    7/26 TTE - mitral annular calcification, otherwise normal mitral valve. Moderate mitral regurgitation. Calcified trileaflet aortic valve with normal opening. Severely dilated left atrium.  LA volume index = 53 cc/m2. Normal left ventricular systolic function. No segmental wall motion abnormalities.. Mild right atrial enlargement.. Normal right ventricular size and function.. Normal tricuspid valve. Mild tricuspid regurgitation.        2. Hyperlipidemia    - Lipitor        Pt appears comfortable at this time and is now medically cleared for discharge home as per Dr. Cronin. Outpatient follow up.    Reviewed discharge medications with patient; All new medications requiring new prescription sent to pharmacy of patients choice. Reviewed need for prescription from previous home medicaitons and new prescriptions sent if requested. Patient in agreement and understands. 89 y/o Female, with a PmHx of HTN, chol, delusional disorder, sent by PMD for afib w/RVR. PT was found to be in narrow complex tachycardia @ 135 bpm (due to afib vs SVT vs 2:1 a flutter). PT without any complaints. Denies chest pain, SOB, KNAPP, PND, orthopnea, palpitations, diaphoresis, lightheadedness, dizziness, syncope, increased lower extremity edema, fever chills, malaise, myalgias, anorexia, weight changes ( loss or gain), night sweats, generalized fatigue abdominal pain, N/V/C/D BRBPR, melena, urinary symptoms, cough, and wheezing. Admitted to telemetry for medical management.        On admission:        1. Left atrial tachycardia/AFlutter -    hstrop: 16    CHADSVASC: 4    Continue metoprolol, asa    House EP following started amiodarone load for atrial tachycardia/atrial flutter    Started eliquis    7/26 TTE - mitral annular calcification, otherwise normal mitral valve. Moderate mitral regurgitation. Calcified trileaflet aortic valve with normal opening. Severely dilated left atrium.  LA volume index = 53 cc/m2. Normal left ventricular systolic function. No segmental wall motion abnormalities.. Mild right atrial enlargement.. Normal right ventricular size and function.. Normal tricuspid valve. Mild tricuspid regurgitation.        2. Hyperlipidemia    - Lipitor        Pt appears comfortable at this time and is now medically cleared for discharge home as per Dr. Cronin. Outpatient follow up.    Reviewed discharge medications with patient; All new medications requiring new prescription sent to pharmacy of patients choice. Reviewed need for prescription from previous home medications and new prescriptions sent if requested. Patient in agreement and understands.

## 2019-07-31 NOTE — DISCHARGE NOTE PROVIDER - NSDCCPCAREPLAN_GEN_ALL_CORE_FT
PRINCIPAL DISCHARGE DIAGNOSIS  Diagnosis: Atrial tachycardia  Assessment and Plan of Treatment: To restore or maintain a normal heart rate and rhythm, to prevent blood clots, and decrease the risks of stroke CVA/TIA.   Please take your medications as prescribed.  Continue to take your blood thinner as prescribed and follow with your physician to monitor your levels.  Low fat diet, reduce caffeine intake, and exercise at least 30 minutes daily.        SECONDARY DISCHARGE DIAGNOSES  Diagnosis: Delusional disorder  Assessment and Plan of Treatment: Continue Resperidone as prescribed. Follow up with your primary care doctor.    Diagnosis: Hypertension  Assessment and Plan of Treatment: To maintain a normal blood pressure to prevent heart attack, stroke and renal failure.   Low sodium and fat diet, continue anti-hypertensive medications, and follow up with primary care physician.    Diagnosis: Hyperlipidemia  Assessment and Plan of Treatment: To maintain normal cholesterol levels to prevent stroke, coronary artery disease, peripheral vascular disease and heart attacks.   Low fat diet, exercise daily and continue current medications. Follow up with primary care physician and cardiologist for management.

## 2019-07-31 NOTE — PROGRESS NOTE ADULT - PROBLEM SELECTOR PLAN 5
- DVT ppx: on Eliquis   - Dispo: [facility] ford

## 2019-07-31 NOTE — PROGRESS NOTE ADULT - SUBJECTIVE AND OBJECTIVE BOX
Patient is a 89y old  Female who presents with a chief complaint of Atrial tachycardia (29 Jul 2019 12:01)      SUBJECTIVE / OVERNIGHT EVENTS: No complaints.     MEDICATIONS  (STANDING):  amiodarone    Tablet 400 milliGRAM(s) Oral two times a day  amLODIPine   Tablet 2.5 milliGRAM(s) Oral daily  apixaban 2.5 milliGRAM(s) Oral two times a day  aspirin  chewable 81 milliGRAM(s) Oral daily  atorvastatin 10 milliGRAM(s) Oral at bedtime  docusate sodium 100 milliGRAM(s) Oral two times a day  LORazepam     Tablet 0.5 milliGRAM(s) Oral daily  melatonin 3 milliGRAM(s) Oral at bedtime  metoprolol tartrate 50 milliGRAM(s) Oral two times a day  multivitamin 1 Tablet(s) Oral daily  risperiDONE   Tablet 0.5 milliGRAM(s) Oral at bedtime  senna 2 Tablet(s) Oral at bedtime  sodium chloride 0.9%. 1000 milliLiter(s) (50 mL/Hr) IV Continuous <Continuous>    MEDICATIONS  (PRN):      Vital Signs Last 24 Hrs  T(C): 36.9 (30 Jul 2019 06:07), Max: 36.9 (30 Jul 2019 06:07)  T(F): 98.4 (30 Jul 2019 06:07), Max: 98.4 (30 Jul 2019 06:07)  HR: 79 (30 Jul 2019 07:30) (62 - 116)  BP: 158/80 (30 Jul 2019 07:30) (141/80 - 176/87)  BP(mean): --  RR: 18 (30 Jul 2019 06:07) (17 - 18)  SpO2: 100% (30 Jul 2019 06:07) (97% - 100%)  CAPILLARY BLOOD GLUCOSE        I&O's Summary      PHYSICAL EXAM:  GENERAL: NAD, well-developed  HEAD:  Atraumatic, Normocephalic  EYES: EOMI, PERRLA, conjunctiva and sclera clear  NECK: Supple, No JVD  CHEST/LUNG: Clear to auscultation bilaterally; No wheeze  HEART: Regular rate and rhythm; No murmurs, rubs, or gallops  ABDOMEN: Soft, Nontender, Nondistended; Bowel sounds present  EXTREMITIES:  2+ Peripheral Pulses, No clubbing, cyanosis, or edema  PSYCH: AAOx3  NEUROLOGY: non-focal  SKIN: No rashes or lesions    LABS:                        9.8    7.45  )-----------( 252      ( 30 Jul 2019 05:40 )             31.4     07-30    139  |  105  |  25<H>  ----------------------------<  98  3.9   |  26  |  1.47<H>    Ca    8.9      30 Jul 2019 05:40  Mg     1.9     07-30                RADIOLOGY & ADDITIONAL TESTS:    Imaging Personally Reviewed:    Consultant(s) Notes Reviewed:      Care Discussed with Consultants/Other Providers:
Patient is a 89y old  Female who presents with a chief complaint of atrial tachycardia (28 Jul 2019 13:39)      SUBJECTIVE / OVERNIGHT EVENTS: pt seen and examined, chart reviewed. No complaints. Denies any chest pain or SOB. OOB in Chair    MEDICATIONS  (STANDING):  amiodarone    Tablet 400 milliGRAM(s) Oral two times a day  apixaban 2.5 milliGRAM(s) Oral two times a day  aspirin  chewable 81 milliGRAM(s) Oral daily  atorvastatin 10 milliGRAM(s) Oral at bedtime  hydrochlorothiazide 12.5 milliGRAM(s) Oral daily  LORazepam     Tablet 0.5 milliGRAM(s) Oral daily  melatonin 3 milliGRAM(s) Oral at bedtime  metoprolol tartrate 50 milliGRAM(s) Oral two times a day  multivitamin 1 Tablet(s) Oral daily  risperiDONE   Tablet 0.5 milliGRAM(s) Oral at bedtime    MEDICATIONS  (PRN):      Vital Signs Last 24 Hrs  T(C): 36.4 (29 Jul 2019 05:39), Max: 36.8 (28 Jul 2019 11:18)  T(F): 97.5 (29 Jul 2019 05:39), Max: 98.3 (28 Jul 2019 17:07)  HR: 90 (29 Jul 2019 05:39) (75 - 99)  BP: 153/104 (29 Jul 2019 05:39) (123/75 - 160/113)  BP(mean): --  RR: 17 (29 Jul 2019 05:39) (17 - 17)  SpO2: 98% (29 Jul 2019 05:39) (98% - 100%)  CAPILLARY BLOOD GLUCOSE        I&O's Summary      PHYSICAL EXAM:  GENERAL: NAD, well-developed  HEAD:  Atraumatic, Normocephalic  EYES: EOMI, PERRLA, conjunctiva and sclera clear  NECK: Supple, No JVD  CHEST/LUNG: Clear to auscultation bilaterally; No wheeze  HEART: Regular rate and rhythm at 80's; No murmurs, rubs, or gallops  ABDOMEN: Soft, Nontender, Nondistended; Bowel sounds present  EXTREMITIES:  2+ Peripheral Pulses, No clubbing, cyanosis, or edema  PSYCH: AAOx3  NEUROLOGY: non-focal  SKIN: No rashes or lesions    LABS:                        11.8   7.41  )-----------( 265      ( 29 Jul 2019 05:55 )             37.0     07-29    140  |  101  |  28<H>  ----------------------------<  112<H>  4.0   |  26  |  1.40<H>    Ca    9.7      29 Jul 2019 05:55  Mg     2.0     07-29                RADIOLOGY & ADDITIONAL TESTS:    Imaging Personally Reviewed:    Consultant(s) Notes Reviewed:      Care Discussed with Consultants/Other Providers: EPS consult.
Patient is seen and examined. Denies any chest pain, SOB, palpitations or dizziness. Feels well    PAST MEDICAL & SURGICAL HISTORY:  H/O fracture of patella  Atrial tachycardia  Cataracts, bilateral  Delusional disorder  Hyperlipidemia  Hypertension  H/O fracture of patella  H/O fracture of patella  H/O cataract extraction  S/P breast biopsy      MEDICATIONS  (STANDING):  amiodarone    Tablet 200 milliGRAM(s) Oral daily  amLODIPine   Tablet 2.5 milliGRAM(s) Oral daily  apixaban 2.5 milliGRAM(s) Oral two times a day  aspirin  chewable 81 milliGRAM(s) Oral daily  atorvastatin 10 milliGRAM(s) Oral at bedtime  docusate sodium 100 milliGRAM(s) Oral two times a day  LORazepam     Tablet 0.5 milliGRAM(s) Oral daily  melatonin 3 milliGRAM(s) Oral at bedtime  metoprolol tartrate 50 milliGRAM(s) Oral two times a day  multivitamin 1 Tablet(s) Oral daily  risperiDONE   Tablet 0.5 milliGRAM(s) Oral at bedtime  senna 2 Tablet(s) Oral at bedtime    MEDICATIONS  (PRN):    Vital Signs Last 24 Hrs  T(C): 36.6 (31 Jul 2019 05:32), Max: 36.8 (30 Jul 2019 20:58)  T(F): 97.9 (31 Jul 2019 05:32), Max: 98.3 (30 Jul 2019 20:58)  HR: 60 (31 Jul 2019 05:32) (60 - 68)  BP: 156/85 (31 Jul 2019 05:32) (137/64 - 156/85)  BP(mean): --  RR: 18 (31 Jul 2019 05:32) (17 - 18)  SpO2: 100% (31 Jul 2019 05:32) (100% - 100%)    INTERPRETATION OF TELEMETRY: Sinus rhythm with HR 50s-60s    LABS:                        11.0   7.82  )-----------( 255      ( 31 Jul 2019 06:55 )             35.1     07-31    142  |  104  |  23  ----------------------------<  89  3.8   |  26  |  1.32<H>    Ca    9.5      31 Jul 2019 06:55  Mg     2.0     07-31      PHYSICAL EXAM:    GENERAL: In no apparent distress, well nourished, and hydrated.  HEAD:  Atraumatic, Normocephalic  HEART: Regular rate and rhythm; No murmurs, rubs, or gallops.  PULMONARY: Clear to auscultation and percussion.  No rales, wheezing, or rhonchi bilaterally.  ABDOMEN: Soft, Nontender, Nondistended; Bowel sounds present  EXTREMITIES:  2+ Peripheral Pulses, No clubbing, cyanosis, or edema
Patient is a 89y old  Female who presents with a chief complaint of Atrial tachycardia/atrial flutter (25 Jul 2019 15:40)      Subjective: Patient feels well this AM, she denies chest pain, SOB or palpitations. Denies nausea, vomiting or abdominal pain.     MEDICATIONS  (STANDING):  amiodarone    Tablet 400 milliGRAM(s) Oral two times a day  apixaban 2.5 milliGRAM(s) Oral two times a day  aspirin  chewable 81 milliGRAM(s) Oral daily  atorvastatin 10 milliGRAM(s) Oral at bedtime  LORazepam     Tablet 0.5 milliGRAM(s) Oral daily  metoprolol tartrate 50 milliGRAM(s) Oral two times a day  multivitamin 1 Tablet(s) Oral daily  risperiDONE   Tablet 0.5 milliGRAM(s) Oral at bedtime    MEDICATIONS  (PRN):        Objective:    Vitals: Vital Signs Last 24 Hrs  T(C): 36.7 (07-26-19 @ 06:51), Max: 37.1 (07-25-19 @ 17:45)  T(F): 98 (07-26-19 @ 06:51), Max: 98.8 (07-25-19 @ 17:45)  HR: 112 (07-26-19 @ 06:51) (63 - 140)  BP: 140/100 (07-26-19 @ 06:51) (127/80 - 155/100)  BP(mean): --  RR: 18 (07-26-19 @ 06:51) (16 - 19)  SpO2: 100% (07-26-19 @ 06:51) (100% - 100%)            I&O's Summary      PHYSICAL EXAM:  GENERAL: NAD, well-groomed, well-developed  HEAD:  Atraumatic, Normocephalic  EYES: EOMI, PERRLA, conjunctiva and sclera clear  ENMT: No tonsillar erythema, exudates, or enlargement; Moist mucous membranes  CHEST/LUNG: Clear to percussion bilaterally; No rales, rhonchi, wheezing, or rubs  HEART: tachycardic   ABDOMEN: Soft, Nontender, Nondistended; Bowel sounds present  EXTREMITIES:  2+ Peripheral Pulses, No clubbing, cyanosis, or edema  LYMPH: No lymphadenopathy noted  SKIN: No rashes or lesions  NERVOUS SYSTEM:  Alert & Oriented X3, Good concentration    LABS:  07-26    141  |  105  |  27<H>  ----------------------------<  120<H>  4.3   |  27  |  1.28  07-25    140  |  105  |  28<H>  ----------------------------<  120<H>  4.8   |  24  |  1.26    Ca    9.5      26 Jul 2019 05:10  Ca    9.7      25 Jul 2019 11:16  Phos  3.0     07-26  Mg     2.0     07-26    TPro  7.3  /  Alb  3.5  /  TBili  0.7  /  DBili  x   /  AST  21  /  ALT  34<H>  /  AlkPhos  70  07-25      PT/INR - ( 25 Jul 2019 11:16 )   PT: 12.0 SEC;   INR: 1.08          PTT - ( 25 Jul 2019 11:16 )  PTT:29.5 SEC                                        10.9   6.53  )-----------( 217      ( 26 Jul 2019 05:10 )             34.2                         11.5   7.34  )-----------( 214      ( 25 Jul 2019 11:16 )             36.3     CAPILLARY BLOOD GLUCOSE          RADIOLOGY & ADDITIONAL TESTS:    Imaging Personally Reviewed:  [X ] YES  [ ] NO      Consultants involved in case:   Consultant(s) Notes Reviewed:  [X ] YES  [ ] NO:   Care Discussed with Consultants/Other Providers [ ] YES  [ ] NO
Patient is a 89y old  Female who presents with a chief complaint of atrial tachycardia (26 Jul 2019 13:41)      Subjective:    MEDICATIONS  (STANDING):  amiodarone    Tablet 400 milliGRAM(s) Oral two times a day  apixaban 2.5 milliGRAM(s) Oral two times a day  aspirin  chewable 81 milliGRAM(s) Oral daily  atorvastatin 10 milliGRAM(s) Oral at bedtime  LORazepam     Tablet 0.5 milliGRAM(s) Oral daily  melatonin 3 milliGRAM(s) Oral at bedtime  metoprolol tartrate 50 milliGRAM(s) Oral two times a day  multivitamin 1 Tablet(s) Oral daily  risperiDONE   Tablet 0.5 milliGRAM(s) Oral at bedtime    MEDICATIONS  (PRN):        Objective:    Vitals: Vital Signs Last 24 Hrs  T(C): 36.6 (07-27-19 @ 06:51), Max: 37.1 (07-26-19 @ 13:50)  T(F): 97.8 (07-27-19 @ 06:51), Max: 98.8 (07-26-19 @ 13:50)  HR: 102 (07-27-19 @ 07:50) (100 - 124)  BP: 148/112 (07-27-19 @ 07:50) (148/91 - 190/125)  BP(mean): --  RR: 18 (07-27-19 @ 06:51) (18 - 18)  SpO2: 100% (07-27-19 @ 06:51) (100% - 100%)            I&O's Summary      PHYSICAL EXAM:  GENERAL: NAD, well-groomed, well-developed  HEAD:  Atraumatic, Normocephalic  EYES: EOMI, PERRLA, conjunctiva and sclera clear  ENMT: No tonsillar erythema, exudates, or enlargement; Moist mucous membranes  CHEST/LUNG: Clear to percussion bilaterally; No rales, rhonchi, wheezing, or rubs  HEART: tachycardic   ABDOMEN: Soft, Nontender, Nondistended; Bowel sounds present  EXTREMITIES:  2+ Peripheral Pulses, No clubbing, cyanosis, or edema  LYMPH: No lymphadenopathy noted  SKIN: No rashes or lesions  NERVOUS SYSTEM:  Alert & Oriented X3, Good concentration                                            LABS:  07-27    138  |  102  |  22  ----------------------------<  116<H>  4.2   |  25  |  1.24  07-26    141  |  105  |  27<H>  ----------------------------<  120<H>  4.3   |  27  |  1.28  07-25    140  |  105  |  28<H>  ----------------------------<  120<H>  4.8   |  24  |  1.26    Ca    9.8      27 Jul 2019 05:12  Ca    9.5      26 Jul 2019 05:10  Ca    9.7      25 Jul 2019 11:16  Phos  3.2     07-27  Mg     2.0     07-27    TPro  7.3  /  Alb  3.6  /  TBili  0.7  /  DBili  x   /  AST  18  /  ALT  27  /  AlkPhos  74  07-27  TPro  7.3  /  Alb  3.5  /  TBili  0.7  /  DBili  x   /  AST  21  /  ALT  34<H>  /  AlkPhos  70  07-25      PT/INR - ( 25 Jul 2019 11:16 )   PT: 12.0 SEC;   INR: 1.08          PTT - ( 25 Jul 2019 11:16 )  PTT:29.5 SEC                                        11.2   8.02  )-----------( 235      ( 27 Jul 2019 05:12 )             35.5                         10.9   6.53  )-----------( 217      ( 26 Jul 2019 05:10 )             34.2                         11.5   7.34  )-----------( 214      ( 25 Jul 2019 11:16 )             36.3     CAPILLARY BLOOD GLUCOSE          RADIOLOGY & ADDITIONAL TESTS:  TTE:   CONCLUSIONS:  1. Mitral annular calcification, otherwise normal mitral  valve. Moderate mitral regurgitation.  2. Calcified trileaflet aortic valve with normal opening.  3. Severely dilated left atrium.  LA volume index = 53  cc/m2.  4. Normal left ventricular systolic function. No segmental  wall motion abnormalities.  5. Mild right atrial enlargement.  6. Normal right ventricular size and function.  7. Normal tricuspid valve. Mild tricuspid regurgitation.  Imaging Personally Reviewed:  [ ] YES  [ ] NO      Consultants involved in case:   Consultant(s) Notes Reviewed:  [ ] YES  [ ] NO:   Care Discussed with Consultants/Other Providers [ ] YES  [ ] NO
Patient is a 89y old  Female who presents with a chief complaint of atrial tachycardia (26 Jul 2019 13:41)    Patient denies CP, SOB or palpitations.    PAST MEDICAL & SURGICAL HISTORY:  H/O fracture of patella  Atrial tachycardia  Cataracts, bilateral  Delusional disorder  Hyperlipidemia  Hypertension  H/O fracture of patella  H/O fracture of patella  H/O cataract extraction  S/P breast biopsy      MEDICATIONS  (STANDING):  amiodarone    Tablet 400 milliGRAM(s) Oral two times a day  apixaban 2.5 milliGRAM(s) Oral two times a day  aspirin  chewable 81 milliGRAM(s) Oral daily  atorvastatin 10 milliGRAM(s) Oral at bedtime  LORazepam     Tablet 0.5 milliGRAM(s) Oral daily  metoprolol tartrate 50 milliGRAM(s) Oral two times a day  multivitamin 1 Tablet(s) Oral daily  risperiDONE   Tablet 0.5 milliGRAM(s) Oral at bedtime    MEDICATIONS  (PRN):            Vital Signs Last 24 Hrs  T(C): 37.1 (26 Jul 2019 13:50), Max: 37.1 (25 Jul 2019 17:45)  T(F): 98.8 (26 Jul 2019 13:50), Max: 98.8 (25 Jul 2019 17:45)  HR: 119 (26 Jul 2019 13:50) (63 - 140)  BP: 148/91 (26 Jul 2019 13:50) (127/80 - 155/100)  BP(mean): --  RR: 18 (26 Jul 2019 13:50) (16 - 19)  SpO2: 100% (26 Jul 2019 13:50) (100% - 100%)            INTERPRETATION OF TELEMETRY: AT with variable AVB at  bpm    ECG:        LABS:                        10.9   6.53  )-----------( 217      ( 26 Jul 2019 05:10 )             34.2     07-26    141  |  105  |  27<H>  ----------------------------<  120<H>  4.3   |  27  |  1.28    Ca    9.5      26 Jul 2019 05:10  Phos  3.0     07-26  Mg     2.0     07-26    TPro  7.3  /  Alb  3.5  /  TBili  0.7  /  DBili  x   /  AST  21  /  ALT  34<H>  /  AlkPhos  70  07-25        PT/INR - ( 25 Jul 2019 11:16 )   PT: 12.0 SEC;   INR: 1.08          PTT - ( 25 Jul 2019 11:16 )  PTT:29.5 SEC      BNP  RADIOLOGY & ADDITIONAL STUDIES:    Echo 12/2017: normal LVEF  PHYSICAL EXAM:    GENERAL: In no apparent distress, well nourished, and hydrated elderly  NECK: Supple and normal thyroid.  No JVD or carotid bruit.  Carotid pulse is 2+ bilaterally.  HEART: S1S2 irregularly irregular tachy; No murmurs, rubs, or gallops.  PULMONARY: Clear to auscultation and perfusion.  No rales, wheezing, or rhonchi bilaterally.  ABDOMEN: Soft, Nontender, Nondistended; Bowel sounds present  EXTREMITIES:  2+ Peripheral Pulses, No clubbing, cyanosis, or edema  NEUROLOGICAL: Grossly nonfocal
Patient is a 89y old  Female who presents with a chief complaint of atrial tachycardia (27 Jul 2019 11:06)      Subjective: patient feels well today. Denies CP, SOB, palpitations. HR 80s-100s    MEDICATIONS  (STANDING):  amiodarone    Tablet 400 milliGRAM(s) Oral two times a day  apixaban 2.5 milliGRAM(s) Oral two times a day  aspirin  chewable 81 milliGRAM(s) Oral daily  atorvastatin 10 milliGRAM(s) Oral at bedtime  hydrochlorothiazide 12.5 milliGRAM(s) Oral daily  LORazepam     Tablet 0.5 milliGRAM(s) Oral daily  melatonin 3 milliGRAM(s) Oral at bedtime  metoprolol tartrate 50 milliGRAM(s) Oral two times a day  multivitamin 1 Tablet(s) Oral daily  risperiDONE   Tablet 0.5 milliGRAM(s) Oral at bedtime    MEDICATIONS  (PRN):        Objective:    Vitals: Vital Signs Last 24 Hrs  T(C): 36.8 (07-28-19 @ 11:18), Max: 36.9 (07-27-19 @ 21:20)  T(F): 98.2 (07-28-19 @ 11:18), Max: 98.4 (07-27-19 @ 21:20)  HR: 76 (07-28-19 @ 11:18) (76 - 117)  BP: 123/75 (07-28-19 @ 11:18) (123/75 - 160/102)  BP(mean): --  RR: 17 (07-28-19 @ 11:18) (17 - 17)  SpO2: 98% (07-28-19 @ 11:18) (98% - 99%)            I&O's Summary      PHYSICAL EXAM:  GENERAL: NAD, well-groomed, well-developed  HEAD:  Atraumatic, Normocephalic  EYES: EOMI, PERRLA, conjunctiva and sclera clear  ENMT: No tonsillar erythema, exudates, or enlargement; Moist mucous membranes  CHEST/LUNG: Clear to percussion bilaterally; No rales, rhonchi, wheezing, or rubs  HEART: RRR  ABDOMEN: Soft, Nontender, Nondistended; Bowel sounds present  EXTREMITIES:  2+ Peripheral Pulses, No clubbing, cyanosis, or edema  LYMPH: No lymphadenopathy noted  SKIN: No rashes or lesions  NERVOUS SYSTEM:  Alert & Oriented X3, Good concentration                                                 LABS:  07-28    140  |  102  |  24<H>  ----------------------------<  97  4.2   |  27  |  1.37<H>  07-27    138  |  102  |  22  ----------------------------<  116<H>  4.2   |  25  |  1.24  07-26    141  |  105  |  27<H>  ----------------------------<  120<H>  4.3   |  27  |  1.28    Ca    9.5      28 Jul 2019 05:54  Ca    9.8      27 Jul 2019 05:12  Ca    9.5      26 Jul 2019 05:10  Phos  3.2     07-27  Mg     2.0     07-28    TPro  7.3  /  Alb  3.6  /  TBili  0.7  /  DBili  x   /  AST  18  /  ALT  27  /  AlkPhos  74  07-27                                              11.0   7.28  )-----------( 256      ( 28 Jul 2019 05:54 )             34.9                         11.2   8.02  )-----------( 235      ( 27 Jul 2019 05:12 )             35.5                         10.9   6.53  )-----------( 217      ( 26 Jul 2019 05:10 )             34.2     CAPILLARY BLOOD GLUCOSE          RADIOLOGY & ADDITIONAL TESTS:    Imaging Personally Reviewed:  [X ] YES  [ ] NO      Consultants involved in case:   Consultant(s) Notes Reviewed:  [X ] YES  [ ] NO:   Care Discussed with Consultants/Other Providers [ ] YES  [ ] NO
Patient is seen and examined Denies any chest pain, SOB, palpitations or dizziness. OOB in chair    PAST MEDICAL & SURGICAL HISTORY:  H/O fracture of patella  Atrial tachycardia  Cataracts, bilateral  Delusional disorder  Hyperlipidemia  Hypertension  H/O fracture of patella  H/O fracture of patella  H/O cataract extraction  S/P breast biopsy      MEDICATIONS  (STANDING):  amiodarone    Tablet 400 milliGRAM(s) Oral two times a day  amLODIPine   Tablet 2.5 milliGRAM(s) Oral daily  apixaban 2.5 milliGRAM(s) Oral two times a day  aspirin  chewable 81 milliGRAM(s) Oral daily  atorvastatin 10 milliGRAM(s) Oral at bedtime  LORazepam     Tablet 0.5 milliGRAM(s) Oral daily  melatonin 3 milliGRAM(s) Oral at bedtime  metoprolol tartrate 50 milliGRAM(s) Oral two times a day  multivitamin 1 Tablet(s) Oral daily  risperiDONE   Tablet 0.5 milliGRAM(s) Oral at bedtime  sodium chloride 0.9%. 1000 milliLiter(s) (50 mL/Hr) IV Continuous <Continuous>    MEDICATIONS  (PRN):      Vital Signs Last 24 Hrs  T(C): 36.4 (29 Jul 2019 05:39), Max: 36.8 (28 Jul 2019 17:07)  T(F): 97.5 (29 Jul 2019 05:39), Max: 98.3 (28 Jul 2019 17:07)  HR: 90 (29 Jul 2019 05:39) (75 - 99)  BP: 153/104 (29 Jul 2019 05:39) (139/95 - 160/113)  BP(mean): --  RR: 17 (29 Jul 2019 05:39) (17 - 17)  SpO2: 98% (29 Jul 2019 05:39) (98% - 100%)      INTERPRETATION OF TELEMETRY: Atrial tachycardia with VR 70s-90s    LABS:                        11.8   7.41  )-----------( 265      ( 29 Jul 2019 05:55 )             37.0     07-29    140  |  101  |  28<H>  ----------------------------<  112<H>  4.0   |  26  |  1.40<H>    Ca    9.7      29 Jul 2019 05:55  Mg     2.0     07-29        PHYSICAL EXAM:    GENERAL: In no apparent distress, well nourished, and hydrated.  HEAD:  Atraumatic, Normocephalic  HEART: Irregular rate and rhythm; No murmurs, rubs, or gallops.  PULMONARY: Clear to auscultation and percussion.  No rales, wheezing, or rhonchi bilaterally.  ABDOMEN: Soft, Nontender, Nondistended; Bowel sounds present  EXTREMITIES:  2+ Peripheral Pulses, No clubbing, cyanosis, or edema
Patient is seen and examined. Denies any chest pain, SOB, palpitations or dizziness.     PAST MEDICAL & SURGICAL HISTORY:  H/O fracture of patella  Atrial tachycardia  Cataracts, bilateral  Delusional disorder  Hyperlipidemia  Hypertension  H/O fracture of patella  H/O fracture of patella  H/O cataract extraction  S/P breast biopsy      MEDICATIONS  (STANDING):  amLODIPine   Tablet 2.5 milliGRAM(s) Oral daily  apixaban 2.5 milliGRAM(s) Oral two times a day  aspirin  chewable 81 milliGRAM(s) Oral daily  atorvastatin 10 milliGRAM(s) Oral at bedtime  docusate sodium 100 milliGRAM(s) Oral two times a day  LORazepam     Tablet 0.5 milliGRAM(s) Oral daily  melatonin 3 milliGRAM(s) Oral at bedtime  metoprolol tartrate 50 milliGRAM(s) Oral two times a day  multivitamin 1 Tablet(s) Oral daily  risperiDONE   Tablet 0.5 milliGRAM(s) Oral at bedtime  senna 2 Tablet(s) Oral at bedtime  sodium chloride 0.9%. 1000 milliLiter(s) (50 mL/Hr) IV Continuous <Continuous>    MEDICATIONS  (PRN):    Vital Signs Last 24 Hrs  T(C): 36.9 (30 Jul 2019 06:07), Max: 36.9 (30 Jul 2019 06:07)  T(F): 98.4 (30 Jul 2019 06:07), Max: 98.4 (30 Jul 2019 06:07)  HR: 79 (30 Jul 2019 07:30) (62 - 116)  BP: 158/80 (30 Jul 2019 07:30) (141/80 - 176/87)  BP(mean): --  RR: 18 (30 Jul 2019 06:07) (17 - 18)  SpO2: 100% (30 Jul 2019 06:07) (97% - 100%)      INTERPRETATION OF TELEMETRY: Atrial tachycardia with VR 60s-70s,         LABS:                        9.8    7.45  )-----------( 252      ( 30 Jul 2019 05:40 )             31.4     07-30    139  |  105  |  25<H>  ----------------------------<  98  3.9   |  26  |  1.47<H>    Ca    8.9      30 Jul 2019 05:40  Mg     1.9     07-30          PHYSICAL EXAM:    GENERAL: In no apparent distress, well nourished, and hydrated.  HEAD:  Atraumatic, Normocephalic  HEART: Irregular rate and rhythm; No murmurs, rubs, or gallops.  PULMONARY: Clear to auscultation and percussion.  No rales, wheezing, or rhonchi bilaterally.  ABDOMEN: Soft, Nontender, Nondistended; Bowel sounds present  EXTREMITIES:  2+ Peripheral Pulses, No clubbing, cyanosis, or edema
Patient is a 89y old  Female who presents with a chief complaint of Atrial tachycardia (30 Jul 2019 12:15)      SUBJECTIVE / OVERNIGHT EVENTS: No complaint. Wants to go home.    MEDICATIONS  (STANDING):  amiodarone    Tablet 200 milliGRAM(s) Oral daily  amLODIPine   Tablet 2.5 milliGRAM(s) Oral daily  apixaban 2.5 milliGRAM(s) Oral two times a day  aspirin  chewable 81 milliGRAM(s) Oral daily  atorvastatin 10 milliGRAM(s) Oral at bedtime  docusate sodium 100 milliGRAM(s) Oral two times a day  LORazepam     Tablet 0.5 milliGRAM(s) Oral daily  melatonin 3 milliGRAM(s) Oral at bedtime  metoprolol tartrate 50 milliGRAM(s) Oral two times a day  multivitamin 1 Tablet(s) Oral daily  risperiDONE   Tablet 0.5 milliGRAM(s) Oral at bedtime  senna 2 Tablet(s) Oral at bedtime    MEDICATIONS  (PRN):      Vital Signs Last 24 Hrs  T(C): 36.6 (31 Jul 2019 05:32), Max: 36.8 (30 Jul 2019 20:58)  T(F): 97.9 (31 Jul 2019 05:32), Max: 98.3 (30 Jul 2019 20:58)  HR: 60 (31 Jul 2019 05:32) (60 - 68)  BP: 156/85 (31 Jul 2019 05:32) (137/64 - 156/85)  BP(mean): --  RR: 18 (31 Jul 2019 05:32) (17 - 18)  SpO2: 100% (31 Jul 2019 05:32) (100% - 100%)  CAPILLARY BLOOD GLUCOSE        I&O's Summary      PHYSICAL EXAM:  GENERAL: NAD, well-developed  HEAD:  Atraumatic, Normocephalic  EYES: EOMI, PERRLA, conjunctiva and sclera clear  NECK: Supple, No JVD  CHEST/LUNG: Clear to auscultation bilaterally; No wheeze  HEART: Regular rate and rhythm; No murmurs, rubs, or gallops  ABDOMEN: Soft, Nontender, Nondistended; Bowel sounds present  EXTREMITIES:  2+ Peripheral Pulses, No clubbing, cyanosis, or edema  PSYCH: AAOx3  NEUROLOGY: non-focal  SKIN: No rashes or lesions    LABS:                        11.0   7.82  )-----------( 255      ( 31 Jul 2019 06:55 )             35.1     07-31    142  |  104  |  23  ----------------------------<  89  3.8   |  26  |  1.32<H>    Ca    9.5      31 Jul 2019 06:55  Mg     2.0     07-31                RADIOLOGY & ADDITIONAL TESTS:    Imaging Personally Reviewed:    Consultant(s) Notes Reviewed:      Care Discussed with Consultants/Other Providers:

## 2019-07-31 NOTE — PROGRESS NOTE ADULT - PROBLEM SELECTOR PLAN 3
- DASH diet   - BP  remains elevated on Metoprolol and Amlodipine   - Will continue to monitor  - titrate up amlodipine to 5mg daily  - C/w metoprolol  -f/u with PCP in 1 week for BP check

## 2019-07-31 NOTE — PROGRESS NOTE ADULT - REASON FOR ADMISSION
Atrial tachycardia

## 2019-07-31 NOTE — PROGRESS NOTE ADULT - PROBLEM SELECTOR PROBLEM 2
R/O Renal function test abnormal
R/O Renal function test abnormal
Hypertension
R/O Renal function test abnormal

## 2019-07-31 NOTE — DISCHARGE NOTE NURSING/CASE MANAGEMENT/SOCIAL WORK - NSSCNAMETXT_GEN_ALL_CORE
[facility]  / [facility]  / [facility] came in c/o collision with another kid while playing soccer. Patient is alert,oriented .Patient is not actively bleeding . Denied any LOC/vomitting

## 2019-07-31 NOTE — PROGRESS NOTE ADULT - PROBLEM SELECTOR PLAN 4
- C/w lipitor
- DVT ppx: on Eliquis   - Dispo: [facility] ford

## 2019-07-31 NOTE — DISCHARGE NOTE PROVIDER - CARE PROVIDER_API CALL
Laura Peterson)  Internal Medicine; Nephrology  [address], [address]  [place] [address]  Phone: [phone]  Fax: [phone]  Follow Up Time:     Vaibhav Benavides)  Cardiac Electrophysiology; Cardiology; Internal Medicine  [address] [place] [address]  Phone: [phone]  Fax: [phone]  Follow Up Time:

## 2019-08-09 ENCOUNTER — APPOINTMENT (OUTPATIENT)
Dept: INTERNAL MEDICINE | Facility: CLINIC | Age: 84
End: 2019-08-09

## 2019-08-30 ENCOUNTER — LABORATORY RESULT (OUTPATIENT)
Age: 84
End: 2019-08-30

## 2019-08-30 ENCOUNTER — APPOINTMENT (OUTPATIENT)
Dept: INTERNAL MEDICINE | Facility: CLINIC | Age: 84
End: 2019-08-30
Payer: MEDICARE

## 2019-08-30 ENCOUNTER — NON-APPOINTMENT (OUTPATIENT)
Age: 84
End: 2019-08-30

## 2019-08-30 VITALS — WEIGHT: 114 LBS | BODY MASS INDEX: 21.8 KG/M2 | HEIGHT: 60.5 IN

## 2019-08-30 VITALS — SYSTOLIC BLOOD PRESSURE: 130 MMHG | DIASTOLIC BLOOD PRESSURE: 70 MMHG

## 2019-08-30 DIAGNOSIS — Z86.39 PERSONAL HISTORY OF OTHER ENDOCRINE, NUTRITIONAL AND METABOLIC DISEASE: ICD-10-CM

## 2019-08-30 DIAGNOSIS — R07.89 OTHER CHEST PAIN: ICD-10-CM

## 2019-08-30 DIAGNOSIS — Z91.81 HISTORY OF FALLING: ICD-10-CM

## 2019-08-30 DIAGNOSIS — M79.604 PAIN IN RIGHT LEG: ICD-10-CM

## 2019-08-30 LAB
BILIRUB UR QL STRIP: NORMAL
CLARITY UR: CLEAR
COLLECTION METHOD: NORMAL
GLUCOSE UR-MCNC: NORMAL
HCG UR QL: 0.2 EU/DL
HGB UR QL STRIP.AUTO: NORMAL
KETONES UR-MCNC: NORMAL
LEUKOCYTE ESTERASE UR QL STRIP: NORMAL
NITRITE UR QL STRIP: NORMAL
PH UR STRIP: 5.5
PROT UR STRIP-MCNC: NORMAL
SP GR UR STRIP: <1.005

## 2019-08-30 PROCEDURE — 99214 OFFICE O/P EST MOD 30 MIN: CPT | Mod: 25

## 2019-08-30 PROCEDURE — 93000 ELECTROCARDIOGRAM COMPLETE: CPT

## 2019-08-30 PROCEDURE — 81003 URINALYSIS AUTO W/O SCOPE: CPT | Mod: QW

## 2019-08-30 PROCEDURE — 36415 COLL VENOUS BLD VENIPUNCTURE: CPT

## 2019-08-30 RX ORDER — BLOOD PRESSURE TEST KIT-MEDIUM
KIT MISCELLANEOUS
Qty: 1 | Refills: 0 | Status: ACTIVE | COMMUNITY
Start: 2019-08-30 | End: 1900-01-01

## 2019-08-30 NOTE — ASSESSMENT
[FreeTextEntry1] : Problems\par Hypercholesterolemia\par Hypertension\par Osteoporosis\par Elevated A1c\par Anxiety\par Assessment\par The patient feels well other than complaining of occasional constipation. She has been more proactive in taking the medications and watching her diet. In addition her son has been instrumental in getting her out of the house and helping her walk. Her blood pressure was well-controlled today 130/80. Her physical examination reveals a grade 2/6 systolic ejection murmur. There was no evidence of congestive heart failure and that the neck veins were flat there was no evidence of hepatojugular reflux and there was no evidence of peripheral edema. Her distal pulses were normal. And bloods were obtained

## 2019-08-30 NOTE — HISTORY OF PRESENT ILLNESS
[de-identified] : This is a patient with a history of hypertension, osteoporosis, elevated A1c, mild heart murmur who is here today for a followup visit.

## 2019-08-30 NOTE — REVIEW OF SYSTEMS
[Fatigue] : fatigue [Vision Problems] : vision problems [Hearing Loss] : hearing loss [Cough] : no cough [Nocturia] : nocturia [Frequency] : frequency [Joint Pain] : joint pain [Joint Stiffness] : joint stiffness [Muscle Weakness] : muscle weakness [Muscle Pain] : muscle pain [Insomnia] : insomnia [Anxiety] : anxiety [Depression] : depression [Negative] : Gastrointestinal

## 2019-08-31 LAB
25(OH)D3 SERPL-MCNC: 32.8 NG/ML
ALBUMIN SERPL ELPH-MCNC: 4.3 G/DL
ALP BLD-CCNC: 75 U/L
ALT SERPL-CCNC: 16 U/L
ANION GAP SERPL CALC-SCNC: 13 MMOL/L
AST SERPL-CCNC: 19 U/L
BASOPHILS # BLD AUTO: 0.01 K/UL
BASOPHILS NFR BLD AUTO: 0.2 %
BILIRUB SERPL-MCNC: 0.4 MG/DL
BUN SERPL-MCNC: 21 MG/DL
CALCIUM SERPL-MCNC: 9.7 MG/DL
CHLORIDE SERPL-SCNC: 103 MMOL/L
CHOLEST SERPL-MCNC: 166 MG/DL
CHOLEST/HDLC SERPL: 2.2 RATIO
CO2 SERPL-SCNC: 25 MMOL/L
CREAT SERPL-MCNC: 1.13 MG/DL
EOSINOPHIL # BLD AUTO: 0.06 K/UL
EOSINOPHIL NFR BLD AUTO: 0.9 %
ESTIMATED AVERAGE GLUCOSE: 123 MG/DL
FERRITIN SERPL-MCNC: 295 NG/ML
GLUCOSE SERPL-MCNC: 98 MG/DL
HBA1C MFR BLD HPLC: 5.9 %
HCT VFR BLD CALC: 34.6 %
HDLC SERPL-MCNC: 74 MG/DL
HGB BLD-MCNC: 10.9 G/DL
IMM GRANULOCYTES NFR BLD AUTO: 0.3 %
LDLC SERPL CALC-MCNC: 81 MG/DL
LYMPHOCYTES # BLD AUTO: 1.38 K/UL
LYMPHOCYTES NFR BLD AUTO: 21.7 %
MAN DIFF?: NORMAL
MCHC RBC-ENTMCNC: 31.5 GM/DL
MCHC RBC-ENTMCNC: 31.7 PG
MCV RBC AUTO: 100.6 FL
MONOCYTES # BLD AUTO: 0.83 K/UL
MONOCYTES NFR BLD AUTO: 13.1 %
NEUTROPHILS # BLD AUTO: 4.06 K/UL
NEUTROPHILS NFR BLD AUTO: 63.8 %
PLATELET # BLD AUTO: 168 K/UL
POTASSIUM SERPL-SCNC: 4.1 MMOL/L
PROT SERPL-MCNC: 7.5 G/DL
RBC # BLD: 3.44 M/UL
RBC # FLD: 13.8 %
SAVE SPECIMEN: NORMAL
SODIUM SERPL-SCNC: 141 MMOL/L
T3RU NFR SERPL: 0.9 TBI
T4 SERPL-MCNC: 9.5 UG/DL
TRIGL SERPL-MCNC: 57 MG/DL
TSH SERPL-ACNC: 2.11 UIU/ML
URATE SERPL-MCNC: 2.9 MG/DL
VIT B12 SERPL-MCNC: 1070 PG/ML
WBC # FLD AUTO: 6.36 K/UL

## 2019-09-05 ENCOUNTER — APPOINTMENT (OUTPATIENT)
Dept: ELECTROPHYSIOLOGY | Facility: CLINIC | Age: 84
End: 2019-09-05
Payer: MEDICARE

## 2019-09-05 ENCOUNTER — NON-APPOINTMENT (OUTPATIENT)
Age: 84
End: 2019-09-05

## 2019-09-05 VITALS
DIASTOLIC BLOOD PRESSURE: 78 MMHG | OXYGEN SATURATION: 96 % | HEART RATE: 62 BPM | WEIGHT: 114 LBS | SYSTOLIC BLOOD PRESSURE: 163 MMHG | BODY MASS INDEX: 18.99 KG/M2 | HEIGHT: 65 IN

## 2019-09-05 PROCEDURE — 93000 ELECTROCARDIOGRAM COMPLETE: CPT

## 2019-09-05 PROCEDURE — 99213 OFFICE O/P EST LOW 20 MIN: CPT

## 2019-09-05 NOTE — HISTORY OF PRESENT ILLNESS
[FreeTextEntry1] : Laura Peterson MD\par \par Armida Kenney is a 89y/o woman with Hx of HTN, HLD, both of which are stable, delusional disorder, and recent hospitalization for asymptomatic atrial tachycardia (likely left sided), now on Amiodarone, who presents today for routine f/u. Admits doing well with some fatigue. Denies chest pain, palpitations, SOB, syncope or near syncope.\par \par \par

## 2019-09-05 NOTE — DISCUSSION/SUMMARY
[FreeTextEntry1] : Armida Kenney is a 89y/o woman with Hx of HTN, HLD, both of which are stable, delusional disorder, and recent hospitalization for asymptomatic atrial tachycardia (likely left sided), now on Amiodarone, who presents today for routine f/u. \par \par Impression:\par \par 1. AT: s/p initiation of Amiodarone. EKG today NSR. Resume Amiodarone 200mg daily. Rediscussed adverse effect profile of Amiodarone including need for frequent monitoring of TFTs, LFTs, Opthalmology examination and PFTs. Currently on Eliquis given AT likely left sided. Resume AC at this time. If remains in NSR with Amiodarone, consider possible discontinuation of AC in future. \par \par 2. HTN: resume oral antihypertensives as prescribed. Encouraged heart healthy diet, sodium restriction, and weight loss. Continue regular f/u with Cardiologist for further HTN management.\par \par 3. HLD: resume statin therapy as prescribed and regular f/u with Cardiologist for routine lipid monitoring and management.\par \par Will continue f/u with Cardiologist and may RTO as needed or if any new or worsening symptoms or findings occur.

## 2019-09-05 NOTE — PHYSICAL EXAM
[General Appearance - Well Developed] : well developed [Normal Appearance] : normal appearance [General Appearance - Well Nourished] : well nourished [Well Groomed] : well groomed [No Deformities] : no deformities [General Appearance - In No Acute Distress] : no acute distress [Normal Conjunctiva] : the conjunctiva exhibited no abnormalities [Eyelids - No Xanthelasma] : the eyelids demonstrated no xanthelasmas [Normal Oral Mucosa] : normal oral mucosa [No Oral Pallor] : no oral pallor [No Oral Cyanosis] : no oral cyanosis [Normal Jugular Venous A Waves Present] : normal jugular venous A waves present [Normal Jugular Venous V Waves Present] : normal jugular venous V waves present [No Jugular Venous Lew A Waves] : no jugular venous lew A waves [Heart Rate And Rhythm] : heart rate and rhythm were normal [Murmurs] : no murmurs present [Heart Sounds] : normal S1 and S2 [Respiration, Rhythm And Depth] : normal respiratory rhythm and effort [Exaggerated Use Of Accessory Muscles For Inspiration] : no accessory muscle use [Abdomen Soft] : soft [Auscultation Breath Sounds / Voice Sounds] : lungs were clear to auscultation bilaterally [Abdomen Tenderness] : non-tender [Abdomen Mass (___ Cm)] : no abdominal mass palpated [Gait - Sufficient For Exercise Testing] : the gait was sufficient for exercise testing [Abnormal Walk] : normal gait [Nail Clubbing] : no clubbing of the fingernails [Cyanosis, Localized] : no localized cyanosis [Petechial Hemorrhages (___cm)] : no petechial hemorrhages [Skin Color & Pigmentation] : normal skin color and pigmentation [] : no rash [No Venous Stasis] : no venous stasis [Skin Lesions] : no skin lesions [No Skin Ulcers] : no skin ulcer [No Xanthoma] : no  xanthoma was observed [Oriented To Time, Place, And Person] : oriented to person, place, and time [Affect] : the affect was normal [Mood] : the mood was normal [No Anxiety] : not feeling anxious

## 2019-09-05 NOTE — REASON FOR VISIT
[Follow-Up - From Hospitalization] : follow-up of a recent hospitalization for [Discharge Date: ___] : Discharge Date: [unfilled] [FreeTextEntry1] : AT

## 2019-09-11 ENCOUNTER — MEDICATION RENEWAL (OUTPATIENT)
Age: 84
End: 2019-09-11

## 2019-09-20 ENCOUNTER — MEDICATION RENEWAL (OUTPATIENT)
Age: 84
End: 2019-09-20

## 2019-09-27 ENCOUNTER — APPOINTMENT (OUTPATIENT)
Dept: OPHTHALMOLOGY | Facility: CLINIC | Age: 84
End: 2019-09-27

## 2019-09-27 ENCOUNTER — RX RENEWAL (OUTPATIENT)
Age: 84
End: 2019-09-27

## 2019-10-18 ENCOUNTER — NON-APPOINTMENT (OUTPATIENT)
Age: 84
End: 2019-10-18

## 2019-10-18 ENCOUNTER — APPOINTMENT (OUTPATIENT)
Dept: OPHTHALMOLOGY | Facility: CLINIC | Age: 84
End: 2019-10-18
Payer: MEDICARE

## 2019-10-18 PROCEDURE — 92014 COMPRE OPH EXAM EST PT 1/>: CPT

## 2019-10-18 PROCEDURE — 92133 CPTRZD OPH DX IMG PST SGM ON: CPT

## 2019-11-15 ENCOUNTER — LABORATORY RESULT (OUTPATIENT)
Age: 84
End: 2019-11-15

## 2019-11-15 ENCOUNTER — APPOINTMENT (OUTPATIENT)
Dept: INTERNAL MEDICINE | Facility: CLINIC | Age: 84
End: 2019-11-15
Payer: MEDICARE

## 2019-11-15 ENCOUNTER — MEDICATION RENEWAL (OUTPATIENT)
Age: 84
End: 2019-11-15

## 2019-11-15 VITALS — HEIGHT: 65 IN | WEIGHT: 117 LBS | BODY MASS INDEX: 19.49 KG/M2

## 2019-11-15 VITALS — SYSTOLIC BLOOD PRESSURE: 120 MMHG | DIASTOLIC BLOOD PRESSURE: 80 MMHG

## 2019-11-15 PROCEDURE — 90686 IIV4 VACC NO PRSV 0.5 ML IM: CPT

## 2019-11-15 PROCEDURE — 36415 COLL VENOUS BLD VENIPUNCTURE: CPT

## 2019-11-15 PROCEDURE — G0008: CPT

## 2019-11-15 PROCEDURE — 99214 OFFICE O/P EST MOD 30 MIN: CPT | Mod: 25

## 2019-11-15 NOTE — REVIEW OF SYSTEMS
[Fatigue] : fatigue [Hearing Loss] : hearing loss [Vision Problems] : vision problems [Cough] : no cough [Nocturia] : nocturia [Frequency] : frequency [Joint Pain] : joint pain [Muscle Weakness] : muscle weakness [Joint Stiffness] : joint stiffness [Muscle Pain] : muscle pain [Insomnia] : insomnia [Anxiety] : anxiety [Depression] : depression [Negative] : Gastrointestinal

## 2019-11-15 NOTE — HISTORY OF PRESENT ILLNESS
[de-identified] : This is a 89-year-old patient with history of hypertension, hypercholesterolemia, SVT, positional vertigo, and urinary frequency who's here today for a followup visit. Her main complaint is urinary frequency and multi-ataxic gait.

## 2019-11-15 NOTE — ASSESSMENT
[FreeTextEntry1] : Problem\par SVT\par Hypertension\par Hypercholesterolemia\par Urinary frequency\par Mild ataxia\par Assessment\par The patient is doing quite well from a cardiological aspect in that she presently is being maintained in a regular sinus rhythm. She also denies any chest pain shortness of breath orthopnea or pedal edema. Her main concern at this point is her inability to walk without using a walker which is secondary to her spinal disease and possible cerebellar disease. And marked urinary frequency drawn a daytime but not at nighttime which is unusual. [facility]

## 2019-11-16 LAB
25(OH)D3 SERPL-MCNC: 46.8 NG/ML
ALBUMIN SERPL ELPH-MCNC: 4.1 G/DL
ALP BLD-CCNC: 70 U/L
ALT SERPL-CCNC: 19 U/L
ANION GAP SERPL CALC-SCNC: 12 MMOL/L
APPEARANCE: CLEAR
AST SERPL-CCNC: 22 U/L
BACTERIA: NEGATIVE
BASOPHILS # BLD AUTO: 0.01 K/UL
BASOPHILS NFR BLD AUTO: 0.2 %
BILIRUB SERPL-MCNC: 0.4 MG/DL
BILIRUBIN URINE: NEGATIVE
BLOOD URINE: NEGATIVE
BUN SERPL-MCNC: 25 MG/DL
CALCIUM SERPL-MCNC: 9.3 MG/DL
CHLORIDE SERPL-SCNC: 105 MMOL/L
CHOLEST SERPL-MCNC: 151 MG/DL
CHOLEST/HDLC SERPL: 2.1 RATIO
CO2 SERPL-SCNC: 26 MMOL/L
COLOR: YELLOW
CREAT SERPL-MCNC: 1.14 MG/DL
EOSINOPHIL # BLD AUTO: 0.03 K/UL
EOSINOPHIL NFR BLD AUTO: 0.5 %
ESTIMATED AVERAGE GLUCOSE: 123 MG/DL
FERRITIN SERPL-MCNC: 173 NG/ML
FOLATE SERPL-MCNC: >20 NG/ML
GLUCOSE QUALITATIVE U: NEGATIVE
GLUCOSE SERPL-MCNC: 100 MG/DL
HBA1C MFR BLD HPLC: 5.9 %
HCT VFR BLD CALC: 35.5 %
HDLC SERPL-MCNC: 73 MG/DL
HGB BLD-MCNC: 11.4 G/DL
HYALINE CASTS: 1 /LPF
IMM GRANULOCYTES NFR BLD AUTO: 0.5 %
KETONES URINE: NEGATIVE
LDLC SERPL CALC-MCNC: 69 MG/DL
LEUKOCYTE ESTERASE URINE: NEGATIVE
LYMPHOCYTES # BLD AUTO: 1 K/UL
LYMPHOCYTES NFR BLD AUTO: 16.1 %
MAN DIFF?: NORMAL
MCHC RBC-ENTMCNC: 32.1 GM/DL
MCHC RBC-ENTMCNC: 32.8 PG
MCV RBC AUTO: 102 FL
MICROSCOPIC-UA: NORMAL
MONOCYTES # BLD AUTO: 0.79 K/UL
MONOCYTES NFR BLD AUTO: 12.7 %
NEUTROPHILS # BLD AUTO: 4.37 K/UL
NEUTROPHILS NFR BLD AUTO: 70 %
NITRITE URINE: NEGATIVE
PH URINE: 6
PLATELET # BLD AUTO: 179 K/UL
POTASSIUM SERPL-SCNC: 4.4 MMOL/L
PROT SERPL-MCNC: 7.1 G/DL
PROTEIN URINE: NORMAL
RBC # BLD: 3.48 M/UL
RBC # FLD: 13.3 %
RED BLOOD CELLS URINE: 5 /HPF
SAVE SPECIMEN: NORMAL
SODIUM SERPL-SCNC: 143 MMOL/L
SPECIFIC GRAVITY URINE: 1.02
SQUAMOUS EPITHELIAL CELLS: 1 /HPF
T3RU NFR SERPL: 0.9 TBI
T4 SERPL-MCNC: 11.3 UG/DL
TRIGL SERPL-MCNC: 46 MG/DL
TSH SERPL-ACNC: 1.73 UIU/ML
URATE SERPL-MCNC: 2.9 MG/DL
UROBILINOGEN URINE: NORMAL
VIT B12 SERPL-MCNC: 1175 PG/ML
WBC # FLD AUTO: 6.23 K/UL
WHITE BLOOD CELLS URINE: 1 /HPF

## 2019-11-22 ENCOUNTER — MEDICATION RENEWAL (OUTPATIENT)
Age: 84
End: 2019-11-22

## 2019-12-23 ENCOUNTER — MEDICATION RENEWAL (OUTPATIENT)
Age: 84
End: 2019-12-23

## 2020-02-02 ENCOUNTER — RX RENEWAL (OUTPATIENT)
Age: 85
End: 2020-02-02

## 2020-02-21 ENCOUNTER — LABORATORY RESULT (OUTPATIENT)
Age: 85
End: 2020-02-21

## 2020-02-21 ENCOUNTER — NON-APPOINTMENT (OUTPATIENT)
Age: 85
End: 2020-02-21

## 2020-02-21 ENCOUNTER — APPOINTMENT (OUTPATIENT)
Dept: INTERNAL MEDICINE | Facility: CLINIC | Age: 85
End: 2020-02-21
Payer: MEDICARE

## 2020-02-21 VITALS — BODY MASS INDEX: 18.99 KG/M2 | WEIGHT: 114 LBS | HEIGHT: 65 IN

## 2020-02-21 VITALS — SYSTOLIC BLOOD PRESSURE: 120 MMHG | DIASTOLIC BLOOD PRESSURE: 70 MMHG

## 2020-02-21 DIAGNOSIS — H81.10 BENIGN PAROXYSMAL VERTIGO, UNSPECIFIED EAR: ICD-10-CM

## 2020-02-21 PROCEDURE — 36415 COLL VENOUS BLD VENIPUNCTURE: CPT

## 2020-02-21 PROCEDURE — 93000 ELECTROCARDIOGRAM COMPLETE: CPT

## 2020-02-21 PROCEDURE — 99214 OFFICE O/P EST MOD 30 MIN: CPT | Mod: 25

## 2020-02-21 NOTE — ASSESSMENT
[FreeTextEntry1] : Problem\par SVT\par Hypertension\par Hypercholesterolemia\par Urinary frequency\par Mild ataxia\par Assessment\par The patient is doing quite well from a cardiological aspect in that she presently is being maintained in a regular sinus rhythm. She also denies any chest pain shortness of breath orthopnea or pedal edema. Her main concern at this point is her inability to walk without using a walker which is secondary to her spinal disease and possible cerebellar disease. And marked urinary frequency drawn a daytime but not at nighttime which is unusual. I referred her to a [facility] urologist for further evaluation\par 2/21/2020\par The patient initiated today for a followup visit. Main complaint is of feeling lightheaded especially when she stands up. She is also complaining of arthritic pains in both hands. Her physical exam reveals that she is in a regular sinus rhythm with a combination of amiodarone and a thrombin inhibitor and aspirin. Of the both ears were impacted bilaterally. She is in a regular sinus rhythm. She also is complaining of times with incontinence. I referred her to [facility] urology and also ENT. Her EKG reveals she is in sinus rhythm

## 2020-02-21 NOTE — HISTORY OF PRESENT ILLNESS
[de-identified] : This is a 89-year-old patient with a history of atrial supraventricular tachycardia who presently is on an anti-thrombin inhibitor and amiodarone. She also has a history of hypertension, hypercholesterolemia and benign positional vertigo

## 2020-02-22 LAB
25(OH)D3 SERPL-MCNC: 37.4 NG/ML
ALBUMIN SERPL ELPH-MCNC: 4.2 G/DL
ALP BLD-CCNC: 75 U/L
ALT SERPL-CCNC: 14 U/L
ANION GAP SERPL CALC-SCNC: 13 MMOL/L
APPEARANCE: CLEAR
AST SERPL-CCNC: 18 U/L
BACTERIA: NEGATIVE
BASOPHILS # BLD AUTO: 0.02 K/UL
BASOPHILS NFR BLD AUTO: 0.3 %
BILIRUB SERPL-MCNC: 0.3 MG/DL
BILIRUBIN URINE: NEGATIVE
BLOOD URINE: NEGATIVE
BUN SERPL-MCNC: 22 MG/DL
CALCIUM SERPL-MCNC: 9.7 MG/DL
CHLORIDE SERPL-SCNC: 103 MMOL/L
CHOLEST SERPL-MCNC: 151 MG/DL
CHOLEST/HDLC SERPL: 2 RATIO
CO2 SERPL-SCNC: 27 MMOL/L
COLOR: YELLOW
CREAT SERPL-MCNC: 1.19 MG/DL
EOSINOPHIL # BLD AUTO: 0.04 K/UL
EOSINOPHIL NFR BLD AUTO: 0.6 %
ESTIMATED AVERAGE GLUCOSE: 126 MG/DL
FERRITIN SERPL-MCNC: 113 NG/ML
FOLATE SERPL-MCNC: >20 NG/ML
GLUCOSE QUALITATIVE U: NEGATIVE
GLUCOSE SERPL-MCNC: 99 MG/DL
HBA1C MFR BLD HPLC: 6 %
HCT VFR BLD CALC: 39.2 %
HDLC SERPL-MCNC: 77 MG/DL
HGB BLD-MCNC: 12.3 G/DL
HYALINE CASTS: 0 /LPF
IMM GRANULOCYTES NFR BLD AUTO: 0.4 %
KETONES URINE: NEGATIVE
LDLC SERPL CALC-MCNC: 62 MG/DL
LEUKOCYTE ESTERASE URINE: NEGATIVE
LYMPHOCYTES # BLD AUTO: 1.3 K/UL
LYMPHOCYTES NFR BLD AUTO: 19.1 %
MAN DIFF?: NORMAL
MCHC RBC-ENTMCNC: 31.4 GM/DL
MCHC RBC-ENTMCNC: 32.4 PG
MCV RBC AUTO: 103.2 FL
MICROSCOPIC-UA: NORMAL
MONOCYTES # BLD AUTO: 0.83 K/UL
MONOCYTES NFR BLD AUTO: 12.2 %
NEUTROPHILS # BLD AUTO: 4.6 K/UL
NEUTROPHILS NFR BLD AUTO: 67.4 %
NITRITE URINE: NEGATIVE
PH URINE: 6.5
PLATELET # BLD AUTO: 192 K/UL
POTASSIUM SERPL-SCNC: 4.5 MMOL/L
PROT SERPL-MCNC: 7.1 G/DL
PROTEIN URINE: NORMAL
RBC # BLD: 3.8 M/UL
RBC # FLD: 13.5 %
RED BLOOD CELLS URINE: 3 /HPF
SODIUM SERPL-SCNC: 142 MMOL/L
SPECIFIC GRAVITY URINE: 1.02
SQUAMOUS EPITHELIAL CELLS: 2 /HPF
T3RU NFR SERPL: 0.9 TBI
T4 SERPL-MCNC: 10.5 UG/DL
TRIGL SERPL-MCNC: 61 MG/DL
TSH SERPL-ACNC: 1.53 UIU/ML
URATE SERPL-MCNC: 3.2 MG/DL
UROBILINOGEN URINE: NORMAL
VIT B12 SERPL-MCNC: 1211 PG/ML
WBC # FLD AUTO: 6.82 K/UL
WHITE BLOOD CELLS URINE: 1 /HPF

## 2020-02-23 LAB — SAVE SPECIMEN: NORMAL

## 2020-05-07 ENCOUNTER — RX RENEWAL (OUTPATIENT)
Age: 85
End: 2020-05-07

## 2020-05-22 ENCOUNTER — APPOINTMENT (OUTPATIENT)
Dept: INTERNAL MEDICINE | Facility: CLINIC | Age: 85
End: 2020-05-22
Payer: MEDICARE

## 2020-05-22 VITALS — HEIGHT: 65 IN | BODY MASS INDEX: 18.66 KG/M2 | WEIGHT: 112 LBS

## 2020-05-22 PROCEDURE — 99448 NTRPROF PH1/NTRNET/EHR 21-30: CPT

## 2020-05-22 NOTE — HISTORY OF PRESENT ILLNESS
[Home] : at home, [unfilled] , at the time of the visit. [Medical Office: ([facility])___] : at the medical office located in  [Verbal consent obtained from patient] : the patient, [unfilled] [Family Member] : family member [de-identified] : This is a 89-year-old patient with history of cardiac arrhythmias for which she's been treated with amiodarone for her cardiologist. In addition she has a history of hypertension, elevated A1c, osteoporosis and an SVT

## 2020-05-22 NOTE — REVIEW OF SYSTEMS
[Fatigue] : fatigue [Vision Problems] : vision problems [Hearing Loss] : hearing loss [Nocturia] : nocturia [Cough] : no cough [Joint Pain] : joint pain [Frequency] : frequency [Muscle Weakness] : muscle weakness [Joint Stiffness] : joint stiffness [Muscle Pain] : muscle pain [Anxiety] : anxiety [Insomnia] : insomnia [Depression] : depression [Negative] : Cardiovascular

## 2020-05-22 NOTE — ASSESSMENT
[FreeTextEntry1] : Problems\par Hypertension\par Osteoporosis\par Elevated A1c\par Tachycardia\par Amiodarone therapy\par Assessment\par The patient feels well and denies any repeat episodes of tachycardia or arrhythmias. She is taking her amiodarone with metoprolol and amlodipine. The patient is complaining of being a bit thirsty so we will arrange to have her sugar checked. I also advised that she increase her fluid intake.

## 2020-07-01 ENCOUNTER — RX RENEWAL (OUTPATIENT)
Age: 85
End: 2020-07-01

## 2020-08-21 ENCOUNTER — LABORATORY RESULT (OUTPATIENT)
Age: 85
End: 2020-08-21

## 2020-08-21 ENCOUNTER — APPOINTMENT (OUTPATIENT)
Dept: INTERNAL MEDICINE | Facility: CLINIC | Age: 85
End: 2020-08-21
Payer: MEDICARE

## 2020-08-21 ENCOUNTER — NON-APPOINTMENT (OUTPATIENT)
Age: 85
End: 2020-08-21

## 2020-08-21 ENCOUNTER — APPOINTMENT (OUTPATIENT)
Dept: INTERNAL MEDICINE | Facility: CLINIC | Age: 85
End: 2020-08-21

## 2020-08-21 VITALS — TEMPERATURE: 97.8 F | BODY MASS INDEX: 18.83 KG/M2 | HEIGHT: 65 IN | WEIGHT: 113 LBS

## 2020-08-21 DIAGNOSIS — R51 HEADACHE: ICD-10-CM

## 2020-08-21 DIAGNOSIS — R01.1 CARDIAC MURMUR, UNSPECIFIED: ICD-10-CM

## 2020-08-21 DIAGNOSIS — Z87.898 PERSONAL HISTORY OF OTHER SPECIFIED CONDITIONS: ICD-10-CM

## 2020-08-21 DIAGNOSIS — L03.039 CELLULITIS OF UNSPECIFIED TOE: ICD-10-CM

## 2020-08-21 DIAGNOSIS — Z79.899 OTHER LONG TERM (CURRENT) DRUG THERAPY: ICD-10-CM

## 2020-08-21 DIAGNOSIS — S82.032D DISPLACED TRANSVERSE FRACTURE OF LEFT PATELLA, SUBSEQUENT ENCOUNTER FOR CLOSED FRACTURE WITH ROUTINE HEALING: ICD-10-CM

## 2020-08-21 PROCEDURE — G0439: CPT

## 2020-08-21 PROCEDURE — 36415 COLL VENOUS BLD VENIPUNCTURE: CPT

## 2020-08-21 PROCEDURE — 93000 ELECTROCARDIOGRAM COMPLETE: CPT

## 2020-08-21 PROCEDURE — 99213 OFFICE O/P EST LOW 20 MIN: CPT | Mod: 25

## 2020-08-21 NOTE — HISTORY OF PRESENT ILLNESS
[de-identified] : This is a 89-year-old patient with a history of atrial tachycardic who is being treated with anti-thrombin inhibitor and doing well. In addition she has a history of tension, heart murmur, SVT

## 2020-08-21 NOTE — REVIEW OF SYSTEMS
[Fatigue] : fatigue [Vision Problems] : vision problems [Cough] : no cough [Hearing Loss] : hearing loss [Nocturia] : nocturia [Frequency] : frequency [Joint Pain] : joint pain [Joint Stiffness] : joint stiffness [Muscle Weakness] : muscle weakness [Muscle Pain] : muscle pain [Anxiety] : anxiety [Insomnia] : insomnia [Depression] : depression [Negative] : Gastrointestinal

## 2020-08-21 NOTE — ASSESSMENT
[FreeTextEntry1] : Problems\par Supraventricular tachycardia\par Hypertension\par Vertigo\par Elevated A1c\par Assessment\par Patient is doing remarkably well in that she's oriented x3. She feels well and denies any chest pains or shortness of breath. Her blood pressure was well-controlled at 120/80. Her physical examination reveals a grade 1/6 systolic ejection murmur and a regular sinus rhythm.\par

## 2020-08-21 NOTE — HEALTH RISK ASSESSMENT
[] : No [No falls in past year] : Patient reported no falls in the past year [0] : 1) Little interest or pleasure doing things: Not at all (0) [No] : No [[id number]] : 0 [de-identified] : Patient needs help in all regards

## 2020-08-22 LAB
25(OH)D3 SERPL-MCNC: 44.8 NG/ML
ALBUMIN SERPL ELPH-MCNC: 4.5 G/DL
ALP BLD-CCNC: 71 U/L
ALT SERPL-CCNC: 17 U/L
ANION GAP SERPL CALC-SCNC: 12 MMOL/L
APPEARANCE: CLEAR
AST SERPL-CCNC: 18 U/L
BACTERIA: NEGATIVE
BILIRUB SERPL-MCNC: 0.6 MG/DL
BILIRUBIN URINE: NEGATIVE
BLOOD URINE: NEGATIVE
BUN SERPL-MCNC: 20 MG/DL
CALCIUM SERPL-MCNC: 9.7 MG/DL
CHLORIDE SERPL-SCNC: 105 MMOL/L
CHOLEST SERPL-MCNC: 155 MG/DL
CHOLEST/HDLC SERPL: 2 RATIO
CO2 SERPL-SCNC: 26 MMOL/L
COLOR: YELLOW
CREAT SERPL-MCNC: 1.1 MG/DL
ESTIMATED AVERAGE GLUCOSE: 123 MG/DL
FERRITIN SERPL-MCNC: 108 NG/ML
FOLATE SERPL-MCNC: >20 NG/ML
GLUCOSE QUALITATIVE U: NEGATIVE
GLUCOSE SERPL-MCNC: 105 MG/DL
HBA1C MFR BLD HPLC: 5.9 %
HDLC SERPL-MCNC: 77 MG/DL
HYALINE CASTS: 0 /LPF
KETONES URINE: NEGATIVE
LDLC SERPL CALC-MCNC: 69 MG/DL
LEUKOCYTE ESTERASE URINE: NEGATIVE
MICROSCOPIC-UA: NORMAL
NITRITE URINE: NEGATIVE
PH URINE: 7
POTASSIUM SERPL-SCNC: 4 MMOL/L
PROT SERPL-MCNC: 7 G/DL
PROTEIN URINE: NEGATIVE
RED BLOOD CELLS URINE: 2 /HPF
SAVE SPECIMEN: NORMAL
SODIUM SERPL-SCNC: 143 MMOL/L
SPECIFIC GRAVITY URINE: 1.01
SQUAMOUS EPITHELIAL CELLS: 1 /HPF
T3RU NFR SERPL: 0.8 TBI
T4 SERPL-MCNC: 11.4 UG/DL
TRIGL SERPL-MCNC: 47 MG/DL
TSH SERPL-ACNC: 1.15 UIU/ML
URATE SERPL-MCNC: 2.9 MG/DL
UROBILINOGEN URINE: NORMAL
VIT B12 SERPL-MCNC: 950 PG/ML
WHITE BLOOD CELLS URINE: 0 /HPF

## 2020-08-23 LAB
BASOPHILS # BLD AUTO: 0 K/UL
BASOPHILS NFR BLD AUTO: 0 %
EOSINOPHIL # BLD AUTO: 0.06 K/UL
EOSINOPHIL NFR BLD AUTO: 0.9 %
HCT VFR BLD CALC: 38.3 %
HGB BLD-MCNC: 11.7 G/DL
LYMPHOCYTES # BLD AUTO: 1.24 K/UL
LYMPHOCYTES NFR BLD AUTO: 19.8 %
MAN DIFF?: NORMAL
MCHC RBC-ENTMCNC: 30.5 GM/DL
MCHC RBC-ENTMCNC: 32.1 PG
MCV RBC AUTO: 105.2 FL
MONOCYTES # BLD AUTO: 0.27 K/UL
MONOCYTES NFR BLD AUTO: 4.3 %
NEUTROPHILS # BLD AUTO: 4.7 K/UL
NEUTROPHILS NFR BLD AUTO: 75 %
PLATELET # BLD AUTO: 183 K/UL
RBC # BLD: 3.64 M/UL
RBC # FLD: 13.7 %
WBC # FLD AUTO: 6.27 K/UL

## 2020-10-09 ENCOUNTER — APPOINTMENT (OUTPATIENT)
Dept: INTERNAL MEDICINE | Facility: CLINIC | Age: 85
End: 2020-10-09
Payer: MEDICARE

## 2020-10-09 VITALS — DIASTOLIC BLOOD PRESSURE: 90 MMHG | SYSTOLIC BLOOD PRESSURE: 150 MMHG

## 2020-10-09 DIAGNOSIS — I10 ESSENTIAL (PRIMARY) HYPERTENSION: ICD-10-CM

## 2020-10-09 PROCEDURE — 99213 OFFICE O/P EST LOW 20 MIN: CPT

## 2020-10-09 NOTE — ASSESSMENT
[FreeTextEntry1] : The patient's blood pressure was 150/90. Her physical examination was unchanged hiatus hydrochlorothiazide 12-1/2 mg daily put her on a low-salt diet and advised to return in 2 weeks

## 2020-10-09 NOTE — HISTORY OF PRESENT ILLNESS
[de-identified] : This is a 89-year-old patient associates have a blood pressure checked. She feels well and denies any complaints

## 2020-11-20 ENCOUNTER — APPOINTMENT (OUTPATIENT)
Dept: INTERNAL MEDICINE | Facility: CLINIC | Age: 85
End: 2020-11-20
Payer: MEDICARE

## 2020-11-20 VITALS — TEMPERATURE: 97.1 F | BODY MASS INDEX: 18.99 KG/M2 | HEIGHT: 65 IN | WEIGHT: 114 LBS

## 2020-11-20 VITALS — SYSTOLIC BLOOD PRESSURE: 130 MMHG | DIASTOLIC BLOOD PRESSURE: 60 MMHG

## 2020-11-20 DIAGNOSIS — Z23 ENCOUNTER FOR IMMUNIZATION: ICD-10-CM

## 2020-11-20 DIAGNOSIS — R00.0 TACHYCARDIA, UNSPECIFIED: ICD-10-CM

## 2020-11-20 PROCEDURE — 90686 IIV4 VACC NO PRSV 0.5 ML IM: CPT

## 2020-11-20 PROCEDURE — 99214 OFFICE O/P EST MOD 30 MIN: CPT | Mod: 25

## 2020-11-20 PROCEDURE — G0008: CPT

## 2020-12-30 ENCOUNTER — RX RENEWAL (OUTPATIENT)
Age: 85
End: 2020-12-30

## 2021-01-29 NOTE — ED ADULT NURSE NOTE - GENITOURINARY WDL
Lipid panel results per Dr Kaiser     Nothing of concern  No orders received   patient's wife notified    Bladder non-tender and non-distended. Urine clear yellow.

## 2021-02-19 ENCOUNTER — LABORATORY RESULT (OUTPATIENT)
Age: 86
End: 2021-02-19

## 2021-02-19 ENCOUNTER — NON-APPOINTMENT (OUTPATIENT)
Age: 86
End: 2021-02-19

## 2021-02-19 ENCOUNTER — APPOINTMENT (OUTPATIENT)
Dept: INTERNAL MEDICINE | Facility: CLINIC | Age: 86
End: 2021-02-19
Payer: MEDICARE

## 2021-02-19 VITALS — BODY MASS INDEX: 22.78 KG/M2 | WEIGHT: 116 LBS | TEMPERATURE: 97.5 F | HEIGHT: 60 IN

## 2021-02-19 DIAGNOSIS — R73.09 OTHER ABNORMAL GLUCOSE: ICD-10-CM

## 2021-02-19 DIAGNOSIS — H35.62 RETINAL HEMORRHAGE, LEFT EYE: ICD-10-CM

## 2021-02-19 PROCEDURE — 36415 COLL VENOUS BLD VENIPUNCTURE: CPT

## 2021-02-19 PROCEDURE — 99072 ADDL SUPL MATRL&STAF TM PHE: CPT

## 2021-02-19 PROCEDURE — 93000 ELECTROCARDIOGRAM COMPLETE: CPT

## 2021-02-19 PROCEDURE — 99214 OFFICE O/P EST MOD 30 MIN: CPT | Mod: 25

## 2021-02-19 NOTE — HISTORY OF PRESENT ILLNESS
[de-identified] : This is a 89-year-old patient with a history of atrial tachycardia, hypertension, hypercholesterolemia, SVT, elevated A1c who is here today for follow-up visit.

## 2021-02-19 NOTE — ASSESSMENT
[FreeTextEntry1] : Problems\par Atrial tachycardia\par Hypertension\par Hypercholesterolemia\par Elevated A1c\par SVT\par Assessment\par Her blood pressure was stable her physical examination reveals that she is in a regular sinus rhythm.\par My present plan is to continue the amiodarone to control her tachycardia and in order to prevent pulmonary emboli or DVTs she is to continue her Antithrombin inhibitor.  Her blood pressure is well controlled on the amlodipine 5 mg daily.\par

## 2021-02-26 LAB
25(OH)D3 SERPL-MCNC: 48.5 NG/ML
ALBUMIN SERPL ELPH-MCNC: 4.5 G/DL
ALP BLD-CCNC: 147 U/L
ALT SERPL-CCNC: 19 U/L
ANION GAP SERPL CALC-SCNC: 15 MMOL/L
APPEARANCE: CLEAR
AST SERPL-CCNC: 24 U/L
BACTERIA: NEGATIVE
BASOPHILS # BLD AUTO: 0.02 K/UL
BASOPHILS NFR BLD AUTO: 0.3 %
BILIRUB SERPL-MCNC: 0.7 MG/DL
BILIRUBIN URINE: NEGATIVE
BLOOD URINE: NEGATIVE
BUN SERPL-MCNC: 25 MG/DL
CALCIUM SERPL-MCNC: 9.8 MG/DL
CHLORIDE SERPL-SCNC: 102 MMOL/L
CHOLEST SERPL-MCNC: 161 MG/DL
CO2 SERPL-SCNC: 24 MMOL/L
COLOR: NORMAL
CREAT SERPL-MCNC: 1.2 MG/DL
EOSINOPHIL # BLD AUTO: 0.1 K/UL
EOSINOPHIL NFR BLD AUTO: 1.3 %
ESTIMATED AVERAGE GLUCOSE: 123 MG/DL
GLUCOSE QUALITATIVE U: NEGATIVE
GLUCOSE SERPL-MCNC: 92 MG/DL
HBA1C MFR BLD HPLC: 5.9 %
HCT VFR BLD CALC: 37.2 %
HDLC SERPL-MCNC: 77 MG/DL
HGB BLD-MCNC: 12 G/DL
HYALINE CASTS: 0 /LPF
IMM GRANULOCYTES NFR BLD AUTO: 0.4 %
KETONES URINE: NEGATIVE
LDLC SERPL CALC-MCNC: 76 MG/DL
LEUKOCYTE ESTERASE URINE: NEGATIVE
LYMPHOCYTES # BLD AUTO: 1.45 K/UL
LYMPHOCYTES NFR BLD AUTO: 19.3 %
MAN DIFF?: NORMAL
MCHC RBC-ENTMCNC: 32.3 GM/DL
MCHC RBC-ENTMCNC: 32.5 PG
MCV RBC AUTO: 100.8 FL
MICROSCOPIC-UA: NORMAL
MONOCYTES # BLD AUTO: 0.77 K/UL
MONOCYTES NFR BLD AUTO: 10.2 %
NEUTROPHILS # BLD AUTO: 5.16 K/UL
NEUTROPHILS NFR BLD AUTO: 68.5 %
NITRITE URINE: NEGATIVE
NONHDLC SERPL-MCNC: 84 MG/DL
PH URINE: 7
PLATELET # BLD AUTO: 196 K/UL
POTASSIUM SERPL-SCNC: 4 MMOL/L
PROT SERPL-MCNC: 7.7 G/DL
PROTEIN URINE: NEGATIVE
RBC # BLD: 3.69 M/UL
RBC # FLD: 13.5 %
RED BLOOD CELLS URINE: 2 /HPF
SODIUM SERPL-SCNC: 140 MMOL/L
SPECIFIC GRAVITY URINE: 1.01
SQUAMOUS EPITHELIAL CELLS: 0 /HPF
T3RU NFR SERPL: 0.8 TBI
T4 SERPL-MCNC: 10.8 UG/DL
TRIGL SERPL-MCNC: 42 MG/DL
TSH SERPL-ACNC: 1.72 UIU/ML
URATE SERPL-MCNC: 2.9 MG/DL
UROBILINOGEN URINE: NORMAL
WBC # FLD AUTO: 7.53 K/UL
WHITE BLOOD CELLS URINE: 1 /HPF

## 2021-04-30 ENCOUNTER — RX RENEWAL (OUTPATIENT)
Age: 86
End: 2021-04-30

## 2021-05-28 ENCOUNTER — NON-APPOINTMENT (OUTPATIENT)
Age: 86
End: 2021-05-28

## 2021-05-28 ENCOUNTER — LABORATORY RESULT (OUTPATIENT)
Age: 86
End: 2021-05-28

## 2021-05-28 ENCOUNTER — APPOINTMENT (OUTPATIENT)
Dept: INTERNAL MEDICINE | Facility: CLINIC | Age: 86
End: 2021-05-28
Payer: COMMERCIAL

## 2021-05-28 VITALS — BODY MASS INDEX: 22.97 KG/M2 | TEMPERATURE: 97.6 F | HEIGHT: 60 IN | WEIGHT: 117 LBS

## 2021-05-28 PROCEDURE — 36415 COLL VENOUS BLD VENIPUNCTURE: CPT

## 2021-05-28 PROCEDURE — 99213 OFFICE O/P EST LOW 20 MIN: CPT | Mod: 25

## 2021-05-28 PROCEDURE — 93000 ELECTROCARDIOGRAM COMPLETE: CPT

## 2021-05-28 NOTE — HISTORY OF PRESENT ILLNESS
[de-identified] : This is a 89-year-old patient of history of supraventricular tachycardia, atrial tachycardia for which is being treated with an anti-thrombin inhibitor. In addition she has a history of hypercholesterolemia and hypertension. Unless his blood pressure was very hard and hydrochlorothiazide was to her regimen

## 2021-05-28 NOTE — ASSESSMENT
[FreeTextEntry1] : Problems\par Atrial tachycardia\par Hypertension\par Hypercholesterolemia\par Svt\par Assessment\par The patient doing very well as far as her cardiac rhythm was turned. Advised to continue her and perform inhibitor and her amiodarone in addition her blood pressure was was excellent at 130/62 I informed the patient to continue\par Her low sodium cardiac and also continue her hydrochlorothiazide 12-1/2 mg

## 2021-06-01 LAB
25(OH)D3 SERPL-MCNC: 42.4 NG/ML
ALBUMIN SERPL ELPH-MCNC: 4 G/DL
ALP BLD-CCNC: 94 U/L
ALT SERPL-CCNC: 19 U/L
ANION GAP SERPL CALC-SCNC: 13 MMOL/L
APPEARANCE: CLEAR
AST SERPL-CCNC: 20 U/L
BACTERIA: NEGATIVE
BASOPHILS # BLD AUTO: 0.02 K/UL
BASOPHILS NFR BLD AUTO: 0.3 %
BILIRUB SERPL-MCNC: 0.4 MG/DL
BILIRUBIN URINE: NEGATIVE
BLOOD URINE: NEGATIVE
BUN SERPL-MCNC: 22 MG/DL
CALCIUM SERPL-MCNC: 9.6 MG/DL
CHLORIDE SERPL-SCNC: 105 MMOL/L
CHOLEST SERPL-MCNC: 138 MG/DL
CO2 SERPL-SCNC: 24 MMOL/L
COLOR: YELLOW
CREAT SERPL-MCNC: 1.25 MG/DL
EOSINOPHIL # BLD AUTO: 0.05 K/UL
EOSINOPHIL NFR BLD AUTO: 0.8 %
ESTIMATED AVERAGE GLUCOSE: 123 MG/DL
FERRITIN SERPL-MCNC: 74 NG/ML
FOLATE SERPL-MCNC: >20 NG/ML
GLUCOSE QUALITATIVE U: NEGATIVE
GLUCOSE SERPL-MCNC: 117 MG/DL
HBA1C MFR BLD HPLC: 5.9 %
HCT VFR BLD CALC: 35.5 %
HDLC SERPL-MCNC: 64 MG/DL
HGB BLD-MCNC: 11.3 G/DL
HYALINE CASTS: 1 /LPF
IMM GRANULOCYTES NFR BLD AUTO: 0.3 %
KETONES URINE: NEGATIVE
LDLC SERPL CALC-MCNC: 63 MG/DL
LEUKOCYTE ESTERASE URINE: NEGATIVE
LYMPHOCYTES # BLD AUTO: 1.12 K/UL
LYMPHOCYTES NFR BLD AUTO: 18.7 %
MAN DIFF?: NORMAL
MCHC RBC-ENTMCNC: 31.8 GM/DL
MCHC RBC-ENTMCNC: 32.1 PG
MCV RBC AUTO: 100.9 FL
MICROSCOPIC-UA: NORMAL
MONOCYTES # BLD AUTO: 0.59 K/UL
MONOCYTES NFR BLD AUTO: 9.8 %
NEUTROPHILS # BLD AUTO: 4.2 K/UL
NEUTROPHILS NFR BLD AUTO: 70.1 %
NITRITE URINE: NEGATIVE
NONHDLC SERPL-MCNC: 74 MG/DL
PH URINE: 6
PLATELET # BLD AUTO: 197 K/UL
POTASSIUM SERPL-SCNC: 3.5 MMOL/L
PROT SERPL-MCNC: 7 G/DL
PROTEIN URINE: NORMAL
RBC # BLD: 3.52 M/UL
RBC # FLD: 13.5 %
RED BLOOD CELLS URINE: 3 /HPF
SAVE SPECIMEN: NORMAL
SODIUM SERPL-SCNC: 142 MMOL/L
SPECIFIC GRAVITY URINE: 1.02
SQUAMOUS EPITHELIAL CELLS: 1 /HPF
T3RU NFR SERPL: 0.9 TBI
T4 SERPL-MCNC: 10 UG/DL
TRIGL SERPL-MCNC: 58 MG/DL
TSH SERPL-ACNC: 1.4 UIU/ML
URATE SERPL-MCNC: 4.2 MG/DL
UROBILINOGEN URINE: NORMAL
VIT B12 SERPL-MCNC: 902 PG/ML
WBC # FLD AUTO: 6 K/UL
WHITE BLOOD CELLS URINE: 1 /HPF

## 2021-07-20 NOTE — ED ADULT TRIAGE NOTE - MODE OF ARRIVAL
2021    From the office of:   URGENT CARE [facility] [address]  [address]  [facility] [address]  Phone: [phone]  Fax: [phone]    In regards to Octavio Ceja, :  2013    Subjective   Patient ID: Octavio is a 8 year old male.  History obtained from patient and parent  Chief Complaint   Patient presents with   • URI     x 1 day. Denies SOB, fever, N/V/D       HPI: Octavio is a 8 year old male  who presents with URI sx.  Started yesterday morning with pharyngitis.  Father tested for strep and negative.  This morning woke up with congestion and rhinorrhea.    Denies fever, cough, abdominal pain, V/D, rash, conjunctivitis, trouble breathing, neuro changes, swelling or the hands or feet.  Eating and drinking well at home with normal UOP >4 per day.    No medication taken for illness.  NO hx of asthma or allergies    Brother just returned from [facility] with URI sx.  COVID PCR (-)  No other sick contacts in the house.  [facility]      I personally reviewed and analyzed notes from PMD    History reviewed. No pertinent past medical history.    MEDICATIONS:  No current outpatient medications on file.     No current facility-administered medications for this visit.       ALLERGIES:  ALLERGIES:   Allergen Reactions   • Penicillins Other (See Comments)     Per mother, sibling has penicillin allergy so they refrain from using       PAST SURGICAL HISTORY:  No past surgical history on file.    FAMILY HISTORY:  No family history on file.    SOCIAL HISTORY:  Social History     Tobacco Use   • Smoking status: Not on file   Substance Use Topics   • Alcohol use: Not on file   • Drug use: Not on file     VACCINES: UTD    Review of Systems A 12 point review of systems was performed. Pertinent positives and negatives are noted in the HPI.       Visit Vitals  Pulse 89   Temp 98.6 °F (37 °C) (Temporal)   Resp 21   Ht 4' 0.03\" (1.22 m)   Wt 23 kg (50 lb  11.3 oz)   SpO2 98%   BMI 15.45 kg/m²       Physical Exam  General: awake, alert, well nourished, well appearing chatty and sweet  HEENT: normocephalic, sclerae white, pupils equal, round, and reactive to light, extraocular movements intact, normal external ears, tympanic membranes normal, nares congested with clear rhinorrhea, mucous membranes moist, oropharynx clear  Neck: supple, no adenopathy or masses  Heart/CV: regular rate and rhythm, no murmur  Lungs/Chest: clear to auscultation bilaterally, no wheeze, no rales, no rhonchi, good expansion and aeration bilaterally  Extr/Muscle: full range of motion of all extremities  Skin: no rash  Neuro: alert, interactive, normal tone, moves all extremities well  I spent a total of 17 minutes on the day of the visit.  This includes pre-charting, chart review and documenting.   ASSESSMENT/ PLAN: Octavio is a 8 year old male presents with congestion, rhinorrhea, and pharyngitis likely due to viral URI.  Pt is well appearing and well hydrated on exam without respiratory distress.  VIRAL URI:  -Home with supportive care  -Reassurance provided regarding viral nature of URIs, need for supportive care, encourage fluids   -Anticipatory guidance and return precautions discussed with family, handout given with AVS  -reviewed dosing of antipyretics and/or benadryl if appropriate  -COVID PCR testing sent and reviewed strict quarantine instructions.  Will call with results  -PMD follow-up as needed if symptoms persist or worsen (including but not limited to worsening respiratory distress, lethargy, signs of LRTI, AOM, or other secondary infection, dehydration or fever persisting more than 4-5 days)  -Family expressed agreement with plan, all questions answered.      FOLLOW-UP:  Return if symptoms worsen or fail to improve.     Plan of care and anticipatory guidance discussed with patient/parents at bedside.  Parents displayed good understanding of plan of care.    Barbara Moise,  MD            EMS

## 2021-07-23 ENCOUNTER — NON-APPOINTMENT (OUTPATIENT)
Age: 86
End: 2021-07-23

## 2021-07-23 ENCOUNTER — APPOINTMENT (OUTPATIENT)
Dept: OPHTHALMOLOGY | Facility: CLINIC | Age: 86
End: 2021-07-23
Payer: MEDICARE

## 2021-07-23 PROCEDURE — 92133 CPTRZD OPH DX IMG PST SGM ON: CPT

## 2021-07-23 PROCEDURE — 92014 COMPRE OPH EXAM EST PT 1/>: CPT

## 2021-08-05 NOTE — ASU PREOP CHECKLIST - TEMPERATURE IN CELSIUS (DEGREES C)
From: Mariela Benson  To: Laurel Gleason  Sent: 8/5/2021 11:22 AM CDT  Subject: Medication Question    Due to a tooth bacterial infection, my dentist prescribed the following medication:  Amoxicillin (amoxil trimox) 500 mg capsule which I took 3 times /day for the full 7 days. Now 2 days after completing the prescription I discover that I have a mild pink rash on my body, my thigh and my upper arm. The instructions for the medication was to contact my physician if this occurs. Your prompt response would be greatly appreciated.    Mariela Benson  [phone]  [email or web address]  
Spoke to Patient in another encounter  
36.8

## 2021-09-03 RX ORDER — OMEPRAZOLE 20 MG/1
20 CAPSULE, DELAYED RELEASE ORAL
Qty: 90 | Refills: 3 | Status: ACTIVE | COMMUNITY
Start: 2019-02-01 | End: 1900-01-01

## 2021-09-10 ENCOUNTER — LABORATORY RESULT (OUTPATIENT)
Age: 86
End: 2021-09-10

## 2021-09-10 ENCOUNTER — NON-APPOINTMENT (OUTPATIENT)
Age: 86
End: 2021-09-10

## 2021-09-10 ENCOUNTER — APPOINTMENT (OUTPATIENT)
Dept: INTERNAL MEDICINE | Facility: CLINIC | Age: 86
End: 2021-09-10
Payer: MEDICARE

## 2021-09-10 VITALS — DIASTOLIC BLOOD PRESSURE: 70 MMHG | SYSTOLIC BLOOD PRESSURE: 120 MMHG

## 2021-09-10 VITALS — TEMPERATURE: 97.3 F | HEIGHT: 60 IN | HEART RATE: 55 BPM | OXYGEN SATURATION: 93 %

## 2021-09-10 DIAGNOSIS — R01.1 CARDIAC MURMUR, UNSPECIFIED: ICD-10-CM

## 2021-09-10 PROCEDURE — 99214 OFFICE O/P EST MOD 30 MIN: CPT | Mod: 25

## 2021-09-10 PROCEDURE — 36415 COLL VENOUS BLD VENIPUNCTURE: CPT

## 2021-09-10 PROCEDURE — 93000 ELECTROCARDIOGRAM COMPLETE: CPT

## 2021-09-10 NOTE — HISTORY OF PRESENT ILLNESS
[de-identified] : This is a 89-year-old patient with a history of atrial tachycardia, hypertension, hypercholesterolemia, SVT, elevated A1c who is here today for follow-up visit.

## 2021-09-11 LAB
25(OH)D3 SERPL-MCNC: 51.2 NG/ML
ALBUMIN SERPL ELPH-MCNC: 4.5 G/DL
ALP BLD-CCNC: 85 U/L
ALT SERPL-CCNC: 21 U/L
ANION GAP SERPL CALC-SCNC: 12 MMOL/L
APPEARANCE: CLEAR
AST SERPL-CCNC: 20 U/L
BACTERIA: NEGATIVE
BASOPHILS # BLD AUTO: 0.01 K/UL
BASOPHILS NFR BLD AUTO: 0.2 %
BILIRUB SERPL-MCNC: 0.5 MG/DL
BILIRUBIN URINE: NEGATIVE
BLOOD URINE: NEGATIVE
BUN SERPL-MCNC: 21 MG/DL
CALCIUM SERPL-MCNC: 9.6 MG/DL
CHLORIDE SERPL-SCNC: 102 MMOL/L
CHOLEST SERPL-MCNC: 140 MG/DL
CO2 SERPL-SCNC: 25 MMOL/L
COLOR: YELLOW
CREAT SERPL-MCNC: 1.07 MG/DL
EOSINOPHIL # BLD AUTO: 0.04 K/UL
EOSINOPHIL NFR BLD AUTO: 0.6 %
ESTIMATED AVERAGE GLUCOSE: 117 MG/DL
FOLATE SERPL-MCNC: >20 NG/ML
GLUCOSE QUALITATIVE U: NEGATIVE
GLUCOSE SERPL-MCNC: 139 MG/DL
HBA1C MFR BLD HPLC: 5.7 %
HCT VFR BLD CALC: 37.6 %
HDLC SERPL-MCNC: 68 MG/DL
HGB BLD-MCNC: 11.7 G/DL
HYALINE CASTS: 0 /LPF
IMM GRANULOCYTES NFR BLD AUTO: 0.3 %
KETONES URINE: NEGATIVE
LDLC SERPL CALC-MCNC: 57 MG/DL
LEUKOCYTE ESTERASE URINE: ABNORMAL
LYMPHOCYTES # BLD AUTO: 1.17 K/UL
LYMPHOCYTES NFR BLD AUTO: 18.7 %
MAN DIFF?: NORMAL
MCHC RBC-ENTMCNC: 31.1 GM/DL
MCHC RBC-ENTMCNC: 32.1 PG
MCV RBC AUTO: 103 FL
MICROSCOPIC-UA: NORMAL
MONOCYTES # BLD AUTO: 0.73 K/UL
MONOCYTES NFR BLD AUTO: 11.6 %
NEUTROPHILS # BLD AUTO: 4.3 K/UL
NEUTROPHILS NFR BLD AUTO: 68.6 %
NITRITE URINE: NEGATIVE
NONHDLC SERPL-MCNC: 71 MG/DL
PH URINE: 6.5
PLATELET # BLD AUTO: 177 K/UL
POTASSIUM SERPL-SCNC: 3.8 MMOL/L
PROT SERPL-MCNC: 7 G/DL
PROTEIN URINE: NEGATIVE
RBC # BLD: 3.65 M/UL
RBC # FLD: 13.5 %
RED BLOOD CELLS URINE: 2 /HPF
SODIUM SERPL-SCNC: 140 MMOL/L
SPECIFIC GRAVITY URINE: 1.02
SQUAMOUS EPITHELIAL CELLS: 2 /HPF
T3 SERPL-MCNC: 66 NG/DL
T3RU NFR SERPL: 0.8 TBI
TRIGL SERPL-MCNC: 72 MG/DL
TSH SERPL-ACNC: 1.2 UIU/ML
URATE SERPL-MCNC: 3.3 MG/DL
UROBILINOGEN URINE: NORMAL
VIT B12 SERPL-MCNC: 834 PG/ML
WBC # FLD AUTO: 6.27 K/UL
WHITE BLOOD CELLS URINE: 12 /HPF

## 2021-11-12 ENCOUNTER — APPOINTMENT (OUTPATIENT)
Dept: OPHTHALMOLOGY | Facility: CLINIC | Age: 86
End: 2021-11-12

## 2021-12-10 ENCOUNTER — APPOINTMENT (OUTPATIENT)
Dept: INTERNAL MEDICINE | Facility: CLINIC | Age: 86
End: 2021-12-10
Payer: MEDICAID

## 2021-12-10 ENCOUNTER — LABORATORY RESULT (OUTPATIENT)
Age: 86
End: 2021-12-10

## 2021-12-10 ENCOUNTER — NON-APPOINTMENT (OUTPATIENT)
Age: 86
End: 2021-12-10

## 2021-12-10 VITALS — DIASTOLIC BLOOD PRESSURE: 70 MMHG | SYSTOLIC BLOOD PRESSURE: 120 MMHG

## 2021-12-10 VITALS — BODY MASS INDEX: 22.58 KG/M2 | HEIGHT: 60 IN | WEIGHT: 115 LBS

## 2021-12-10 DIAGNOSIS — M81.0 AGE-RELATED OSTEOPOROSIS W/OUT CURRENT PATHOLOGICAL FRACTURE: ICD-10-CM

## 2021-12-10 PROCEDURE — 99072 ADDL SUPL MATRL&STAF TM PHE: CPT

## 2021-12-10 PROCEDURE — 99214 OFFICE O/P EST MOD 30 MIN: CPT | Mod: 25

## 2021-12-10 PROCEDURE — 93000 ELECTROCARDIOGRAM COMPLETE: CPT

## 2021-12-10 PROCEDURE — 36415 COLL VENOUS BLD VENIPUNCTURE: CPT

## 2021-12-10 NOTE — ASSESSMENT
[FreeTextEntry1] : Problems\par Atrial tachycardia\par Hypertension\par Hypercholesterolemia\par Elevated A1c\par SVT\par Assessment\par Her blood pressure was stable her physical examination reveals that she is in a regular sinus rhythm.\par My present plan is to continue the amiodarone to control her tachycardia and in order to prevent pulmonary emboli or DVTs she is to continue her Antithrombin inhibitor.  Her blood pressure is well controlled on the amlodipine 5 mg daily.\par 12/10/21\par This is a 89-year-old lady with a history of supraventricular tachycardia for which she presently is being treated with amiodarone 200 mg daily and also Eliquis 2-1/2 mg twice a day.  Her rate presently is very well controlled and she is in a sinus rhythm.  In addition she has a history of hypertension which presently is being treated with a calcium channel blocker with very good effect her blood pressure today was 130/70.  She is also on a small dose hydrochlorothiazide for this.  Bloods were drawn to assess her cholesterol and her A1c.  For 89-year-old woman she is doing remarkably well\par \par \par

## 2021-12-11 LAB
25(OH)D3 SERPL-MCNC: 42.7 NG/ML
ALBUMIN SERPL ELPH-MCNC: 4.1 G/DL
ALP BLD-CCNC: 102 U/L
ALT SERPL-CCNC: 21 U/L
ANION GAP SERPL CALC-SCNC: 13 MMOL/L
APPEARANCE: CLEAR
AST SERPL-CCNC: 21 U/L
BACTERIA: NEGATIVE
BASOPHILS # BLD AUTO: 0.02 K/UL
BASOPHILS NFR BLD AUTO: 0.4 %
BILIRUB SERPL-MCNC: 0.4 MG/DL
BILIRUBIN URINE: NEGATIVE
BLOOD URINE: NORMAL
BUN SERPL-MCNC: 21 MG/DL
CALCIUM SERPL-MCNC: 9.5 MG/DL
CHLORIDE SERPL-SCNC: 101 MMOL/L
CHOLEST SERPL-MCNC: 153 MG/DL
CO2 SERPL-SCNC: 26 MMOL/L
COLOR: NORMAL
CREAT SERPL-MCNC: 1.2 MG/DL
EOSINOPHIL # BLD AUTO: 0.04 K/UL
EOSINOPHIL NFR BLD AUTO: 0.7 %
ESTIMATED AVERAGE GLUCOSE: 120 MG/DL
FERRITIN SERPL-MCNC: 47 NG/ML
FOLATE SERPL-MCNC: >20 NG/ML
GLUCOSE QUALITATIVE U: NEGATIVE
GLUCOSE SERPL-MCNC: 113 MG/DL
HBA1C MFR BLD HPLC: 5.8 %
HCT VFR BLD CALC: 37.4 %
HDLC SERPL-MCNC: 70 MG/DL
HGB BLD-MCNC: 11.6 G/DL
HYALINE CASTS: 0 /LPF
IMM GRANULOCYTES NFR BLD AUTO: 0.4 %
KETONES URINE: NEGATIVE
LDLC SERPL CALC-MCNC: 70 MG/DL
LEUKOCYTE ESTERASE URINE: ABNORMAL
LYMPHOCYTES # BLD AUTO: 0.88 K/UL
LYMPHOCYTES NFR BLD AUTO: 15.7 %
MAN DIFF?: NORMAL
MCHC RBC-ENTMCNC: 31 GM/DL
MCHC RBC-ENTMCNC: 32.3 PG
MCV RBC AUTO: 104.2 FL
MICROSCOPIC-UA: NORMAL
MONOCYTES # BLD AUTO: 0.6 K/UL
MONOCYTES NFR BLD AUTO: 10.7 %
NEUTROPHILS # BLD AUTO: 4.06 K/UL
NEUTROPHILS NFR BLD AUTO: 72.1 %
NITRITE URINE: NEGATIVE
NONHDLC SERPL-MCNC: 82 MG/DL
PH URINE: 6
PLATELET # BLD AUTO: 221 K/UL
POTASSIUM SERPL-SCNC: 3.8 MMOL/L
PROT SERPL-MCNC: 7.3 G/DL
PROTEIN URINE: NEGATIVE
RBC # BLD: 3.59 M/UL
RBC # FLD: 13.7 %
RED BLOOD CELLS URINE: 3 /HPF
SODIUM SERPL-SCNC: 140 MMOL/L
SPECIFIC GRAVITY URINE: 1.02
SQUAMOUS EPITHELIAL CELLS: 1 /HPF
T3RU NFR SERPL: 0.8 TBI
T4 SERPL-MCNC: 10.8 UG/DL
TRIGL SERPL-MCNC: 59 MG/DL
TSH SERPL-ACNC: 1.57 UIU/ML
URATE SERPL-MCNC: 3.6 MG/DL
UROBILINOGEN URINE: NORMAL
VIT B12 SERPL-MCNC: 786 PG/ML
WBC # FLD AUTO: 5.62 K/UL
WHITE BLOOD CELLS URINE: 4 /HPF

## 2022-01-11 ENCOUNTER — NON-APPOINTMENT (OUTPATIENT)
Age: 87
End: 2022-01-11

## 2022-01-11 DIAGNOSIS — M25.559 PAIN IN UNSPECIFIED HIP: ICD-10-CM

## 2022-01-12 ENCOUNTER — NON-APPOINTMENT (OUTPATIENT)
Age: 87
End: 2022-01-12

## 2022-04-08 ENCOUNTER — NON-APPOINTMENT (OUTPATIENT)
Age: 87
End: 2022-04-08

## 2022-04-08 ENCOUNTER — APPOINTMENT (OUTPATIENT)
Dept: INTERNAL MEDICINE | Facility: CLINIC | Age: 87
End: 2022-04-08
Payer: MEDICAID

## 2022-04-08 VITALS — DIASTOLIC BLOOD PRESSURE: 70 MMHG | SYSTOLIC BLOOD PRESSURE: 120 MMHG

## 2022-04-08 VITALS — WEIGHT: 121 LBS | BODY MASS INDEX: 23.75 KG/M2 | HEIGHT: 60 IN

## 2022-04-08 DIAGNOSIS — A49.9 URINARY TRACT INFECTION, SITE NOT SPECIFIED: ICD-10-CM

## 2022-04-08 DIAGNOSIS — N39.0 URINARY TRACT INFECTION, SITE NOT SPECIFIED: ICD-10-CM

## 2022-04-08 PROCEDURE — 36415 COLL VENOUS BLD VENIPUNCTURE: CPT

## 2022-04-08 PROCEDURE — 93000 ELECTROCARDIOGRAM COMPLETE: CPT | Mod: 59

## 2022-04-08 PROCEDURE — 99214 OFFICE O/P EST MOD 30 MIN: CPT | Mod: 25

## 2022-04-08 PROCEDURE — 99072 ADDL SUPL MATRL&STAF TM PHE: CPT

## 2022-04-08 NOTE — HISTORY OF PRESENT ILLNESS
[de-identified] : This is a 89-year-old patient with a history of atrial tachycardia, hypertension, hypercholesterolemia, SVT, elevated A1c who is here today for follow-up visit.

## 2022-04-08 NOTE — ASSESSMENT
[FreeTextEntry1] : Problems\par Atrial tachycardia\par Hypertension\par Hypercholesterolemia\par Elevated A1c\par SVT\par Assessment\par Her blood pressure was stable her physical examination reveals that she is in a regular sinus rhythm.\par My present plan is to continue the amiodarone to control her tachycardia and in order to prevent pulmonary emboli or DVTs she is to continue her Antithrombin inhibitor.  Her blood pressure is well controlled on the amlodipine 5 mg daily.\par 12/10/21\par This is a 89-year-old lady with a history of supraventricular tachycardia for which she presently is being treated with amiodarone 200 mg daily and also Eliquis 2-1/2 mg twice a day.  Her rate presently is very well controlled and she is in a sinus rhythm.  In addition she has a history of hypertension which presently is being treated with a calcium channel blocker with very good effect her blood pressure today was 130/70.  She is also on a small dose hydrochlorothiazide for this.  Bloods were drawn to assess her cholesterol and her A1c.  For 89-year-old woman she is doing remarkably well\par 4/8/22\par Problem problems\par SVT\par Hypertension\par Hypercholesterolemia\par Elevated A1c assessment\par This is a remarkable 89-year-old lady who has the above-mentioned problems who presently is being treated with a combination of amiodarone, and Eliquis 2-1/2mg twice a day.  Her hypertension is being treated with amlodipine.  I explained to the patient the side effects of using an Antithrombin inhibitor that there is no antidote in case she starts to bleed and has to be careful about trips and falls.  Her blood pressure was normal her physical examination was unchanged.  Her EKG reveals a regular sinus rhythm.\par \par \par

## 2022-04-09 LAB
ALBUMIN SERPL ELPH-MCNC: 4.2 G/DL
ALP BLD-CCNC: 116 U/L
ALT SERPL-CCNC: 24 U/L
ANION GAP SERPL CALC-SCNC: 13 MMOL/L
AST SERPL-CCNC: 24 U/L
BASOPHILS # BLD AUTO: 0.02 K/UL
BASOPHILS NFR BLD AUTO: 0.3 %
BILIRUB SERPL-MCNC: 0.4 MG/DL
BUN SERPL-MCNC: 24 MG/DL
CALCIUM SERPL-MCNC: 9.5 MG/DL
CHLORIDE SERPL-SCNC: 102 MMOL/L
CO2 SERPL-SCNC: 25 MMOL/L
CREAT SERPL-MCNC: 1.23 MG/DL
EGFR: 41 ML/MIN/1.73M2
EOSINOPHIL # BLD AUTO: 0.08 K/UL
EOSINOPHIL NFR BLD AUTO: 1.3 %
GLUCOSE SERPL-MCNC: 111 MG/DL
HCT VFR BLD CALC: 38.9 %
HGB BLD-MCNC: 12.1 G/DL
IMM GRANULOCYTES NFR BLD AUTO: 0.3 %
LYMPHOCYTES # BLD AUTO: 1.09 K/UL
LYMPHOCYTES NFR BLD AUTO: 17.1 %
MAN DIFF?: NORMAL
MCHC RBC-ENTMCNC: 31.1 GM/DL
MCHC RBC-ENTMCNC: 31.5 PG
MCV RBC AUTO: 101.3 FL
MONOCYTES # BLD AUTO: 1.03 K/UL
MONOCYTES NFR BLD AUTO: 16.1 %
NEUTROPHILS # BLD AUTO: 4.14 K/UL
NEUTROPHILS NFR BLD AUTO: 64.9 %
PLATELET # BLD AUTO: 186 K/UL
POTASSIUM SERPL-SCNC: 4 MMOL/L
PROT SERPL-MCNC: 7.8 G/DL
RBC # BLD: 3.84 M/UL
RBC # FLD: 13.9 %
SODIUM SERPL-SCNC: 140 MMOL/L
WBC # FLD AUTO: 6.38 K/UL

## 2022-04-10 LAB — BACTERIA UR CULT: NORMAL

## 2022-07-15 ENCOUNTER — APPOINTMENT (OUTPATIENT)
Dept: INTERNAL MEDICINE | Facility: CLINIC | Age: 87
End: 2022-07-15

## 2022-07-15 ENCOUNTER — LABORATORY RESULT (OUTPATIENT)
Age: 87
End: 2022-07-15

## 2022-07-15 ENCOUNTER — NON-APPOINTMENT (OUTPATIENT)
Age: 87
End: 2022-07-15

## 2022-07-15 VITALS — SYSTOLIC BLOOD PRESSURE: 120 MMHG | DIASTOLIC BLOOD PRESSURE: 70 MMHG

## 2022-07-15 VITALS — WEIGHT: 117 LBS | HEIGHT: 60 IN | BODY MASS INDEX: 22.97 KG/M2

## 2022-07-15 DIAGNOSIS — Z00.00 ENCOUNTER FOR GENERAL ADULT MEDICAL EXAMINATION W/OUT ABNORMAL FINDINGS: ICD-10-CM

## 2022-07-15 PROCEDURE — 99397 PER PM REEVAL EST PAT 65+ YR: CPT | Mod: 25,GY

## 2022-07-15 PROCEDURE — 36415 COLL VENOUS BLD VENIPUNCTURE: CPT

## 2022-07-15 PROCEDURE — 93000 ELECTROCARDIOGRAM COMPLETE: CPT

## 2022-07-15 RX ORDER — DOXYCYCLINE HYCLATE 50 MG/1
50 CAPSULE ORAL
Qty: 14 | Refills: 0 | Status: DISCONTINUED | COMMUNITY
Start: 2022-04-08 | End: 2022-07-15

## 2022-07-15 NOTE — REVIEW OF SYSTEMS
[Fatigue] : fatigue [Vision Problems] : vision problems [Hearing Loss] : hearing loss [Nocturia] : nocturia [Frequency] : frequency [Joint Pain] : joint pain [Joint Stiffness] : joint stiffness [Muscle Weakness] : muscle weakness [Muscle Pain] : muscle pain [Insomnia] : insomnia [Anxiety] : anxiety [Depression] : depression [Negative] : Gastrointestinal [Cough] : no cough

## 2022-07-15 NOTE — HEALTH RISK ASSESSMENT
[No] : No [No falls in past year] : Patient reported no falls in the past year [0] : 2) Feeling down, depressed, or hopeless: Not at all (0) [PHQ-2 Negative - No further assessment needed] : PHQ-2 Negative - No further assessment needed [[id number]] : 0 [de-identified] : Patient needs help in all regards

## 2022-07-15 NOTE — ASSESSMENT
[FreeTextEntry1] : Problems\par Supraventricular tachycardia\par Hypertension\par Vertigo\par Elevated A1c\par Assessment\par This is a 89-year-old patient who is here today for her annual well examination.  Her main problem is episodes of SVT and atrial fibrillation for which she is presently being treated with Antithrombin inhibitor.  In addition she has a history of hypertension.  She is on amiodarone 200mg daily for her atrial fib and SVT and she is on amlodipine 5mg daily for her blood pressure.  In addition she has a history of hypercholesterolemia which is being treated with atorvastatin 10 mg daily\par

## 2022-07-15 NOTE — HISTORY OF PRESENT ILLNESS
[de-identified] : This is a 89 -year-old patient with a history of atrial tachycardic who is being treated with anti-thrombin inhibitor and doing well. In addition she has a history of tension, heart murmur, SVT

## 2022-07-16 LAB
25(OH)D3 SERPL-MCNC: 49.3 NG/ML
ALBUMIN SERPL ELPH-MCNC: 4.3 G/DL
ALP BLD-CCNC: 103 U/L
ALT SERPL-CCNC: 31 U/L
ANION GAP SERPL CALC-SCNC: 14 MMOL/L
APPEARANCE: CLEAR
AST SERPL-CCNC: 29 U/L
BACTERIA: NEGATIVE
BASOPHILS # BLD AUTO: 0.01 K/UL
BASOPHILS NFR BLD AUTO: 0.1 %
BILIRUB SERPL-MCNC: 0.6 MG/DL
BILIRUBIN URINE: NEGATIVE
BLOOD URINE: NEGATIVE
BUN SERPL-MCNC: 19 MG/DL
CALCIUM SERPL-MCNC: 9.9 MG/DL
CHLORIDE SERPL-SCNC: 103 MMOL/L
CHOLEST SERPL-MCNC: 149 MG/DL
CO2 SERPL-SCNC: 25 MMOL/L
COLOR: YELLOW
CREAT SERPL-MCNC: 1.2 MG/DL
EGFR: 42 ML/MIN/1.73M2
EOSINOPHIL # BLD AUTO: 0.06 K/UL
EOSINOPHIL NFR BLD AUTO: 0.9 %
ESTIMATED AVERAGE GLUCOSE: 123 MG/DL
FERRITIN SERPL-MCNC: 78 NG/ML
FOLATE SERPL-MCNC: >20 NG/ML
GLUCOSE QUALITATIVE U: NEGATIVE
GLUCOSE SERPL-MCNC: 125 MG/DL
HBA1C MFR BLD HPLC: 5.9 %
HCT VFR BLD CALC: 38.5 %
HDLC SERPL-MCNC: 64 MG/DL
HGB BLD-MCNC: 12.3 G/DL
HYALINE CASTS: 1 /LPF
IMM GRANULOCYTES NFR BLD AUTO: 0.4 %
KETONES URINE: NEGATIVE
LDLC SERPL CALC-MCNC: 72 MG/DL
LEUKOCYTE ESTERASE URINE: NEGATIVE
LYMPHOCYTES # BLD AUTO: 0.91 K/UL
LYMPHOCYTES NFR BLD AUTO: 13.2 %
MAN DIFF?: NORMAL
MCHC RBC-ENTMCNC: 31.9 GM/DL
MCHC RBC-ENTMCNC: 32.5 PG
MCV RBC AUTO: 101.6 FL
MICROSCOPIC-UA: NORMAL
MONOCYTES # BLD AUTO: 0.58 K/UL
MONOCYTES NFR BLD AUTO: 8.4 %
NEUTROPHILS # BLD AUTO: 5.32 K/UL
NEUTROPHILS NFR BLD AUTO: 77 %
NITRITE URINE: NEGATIVE
NONHDLC SERPL-MCNC: 85 MG/DL
PH URINE: 6
PLATELET # BLD AUTO: 177 K/UL
POTASSIUM SERPL-SCNC: 4.1 MMOL/L
PROT SERPL-MCNC: 7.5 G/DL
PROTEIN URINE: NORMAL
RBC # BLD: 3.79 M/UL
RBC # FLD: 13.7 %
RED BLOOD CELLS URINE: 3 /HPF
SODIUM SERPL-SCNC: 142 MMOL/L
SPECIFIC GRAVITY URINE: 1.02
SQUAMOUS EPITHELIAL CELLS: 1 /HPF
T3RU NFR SERPL: 0.8 TBI
T4 SERPL-MCNC: 11.7 UG/DL
TRIGL SERPL-MCNC: 65 MG/DL
TSH SERPL-ACNC: 1.3 UIU/ML
URATE SERPL-MCNC: 3.5 MG/DL
UROBILINOGEN URINE: NORMAL
VIT B12 SERPL-MCNC: 942 PG/ML
WBC # FLD AUTO: 6.91 K/UL
WHITE BLOOD CELLS URINE: 1 /HPF

## 2022-10-14 ENCOUNTER — NON-APPOINTMENT (OUTPATIENT)
Age: 87
End: 2022-10-14

## 2022-10-14 ENCOUNTER — APPOINTMENT (OUTPATIENT)
Dept: INTERNAL MEDICINE | Facility: CLINIC | Age: 87
End: 2022-10-14
Payer: MEDICAID

## 2022-10-14 VITALS — SYSTOLIC BLOOD PRESSURE: 120 MMHG | DIASTOLIC BLOOD PRESSURE: 70 MMHG

## 2022-10-14 VITALS — WEIGHT: 117 LBS | HEIGHT: 60 IN | BODY MASS INDEX: 22.97 KG/M2

## 2022-10-14 PROCEDURE — 93000 ELECTROCARDIOGRAM COMPLETE: CPT | Mod: 59

## 2022-10-14 PROCEDURE — 36415 COLL VENOUS BLD VENIPUNCTURE: CPT

## 2022-10-14 PROCEDURE — 99214 OFFICE O/P EST MOD 30 MIN: CPT | Mod: 25

## 2022-10-14 NOTE — HISTORY OF PRESENT ILLNESS
[de-identified] : This is a 89-year-old patient with a history of rapid atrial fibrillation and also supraventricular tachycardia.  She has been treated with amiodarone beta-blocker and Antithrombin inhibitor and is doing very well.  She denies any chest pains or shortness of breath.  In addition she has a history of hypertension and hypercholesterolemia.

## 2022-10-14 NOTE — ASSESSMENT
[FreeTextEntry1] : Problems\par Atrial tachycardia\par Hypertension\par Supraventricular tachycardia\par Hypercholesterolemia\par Assessment\par The patient presently is on a combination of amiodarone beta-blocker and an Antithrombin inhibitor.  Her blood pressure was very well controlled at 130/70 her physical examination is normal and she presently is in a regular sinus rhythm without any evidence of PVCs APCs tachycardia.  In addition there is no evidence of congestive heart failure and that her neck veins are flat her lungs are clear bilaterally and she has no pedal edema.  She was advised to continue her present regimen and also cautioned about falls or head trauma because of her anticoagulation

## 2022-10-16 LAB
ALBUMIN SERPL ELPH-MCNC: 4.3 G/DL
ALP BLD-CCNC: 113 U/L
ALT SERPL-CCNC: 30 U/L
ANION GAP SERPL CALC-SCNC: 13 MMOL/L
APPEARANCE: CLEAR
AST SERPL-CCNC: 25 U/L
BACTERIA: NEGATIVE
BASOPHILS # BLD AUTO: 0.01 K/UL
BASOPHILS NFR BLD AUTO: 0.1 %
BILIRUB SERPL-MCNC: 0.6 MG/DL
BILIRUBIN URINE: NEGATIVE
BLOOD URINE: NEGATIVE
BUN SERPL-MCNC: 19 MG/DL
CALCIUM SERPL-MCNC: 9.6 MG/DL
CHLORIDE SERPL-SCNC: 100 MMOL/L
CO2 SERPL-SCNC: 26 MMOL/L
COLOR: YELLOW
CREAT SERPL-MCNC: 1.11 MG/DL
EGFR: 46 ML/MIN/1.73M2
EOSINOPHIL # BLD AUTO: 0.05 K/UL
EOSINOPHIL NFR BLD AUTO: 0.7 %
GLUCOSE QUALITATIVE U: NEGATIVE
GLUCOSE SERPL-MCNC: 103 MG/DL
GLUCOSE SERPL-MCNC: 111 MG/DL
HCT VFR BLD CALC: 38.5 %
HGB BLD-MCNC: 12.2 G/DL
HYALINE CASTS: 1 /LPF
IMM GRANULOCYTES NFR BLD AUTO: 0.3 %
KETONES URINE: NEGATIVE
LEUKOCYTE ESTERASE URINE: NEGATIVE
LYMPHOCYTES # BLD AUTO: 1.02 K/UL
LYMPHOCYTES NFR BLD AUTO: 15.2 %
MAN DIFF?: NORMAL
MCHC RBC-ENTMCNC: 31.7 GM/DL
MCHC RBC-ENTMCNC: 32.6 PG
MCV RBC AUTO: 102.9 FL
MICROSCOPIC-UA: NORMAL
MONOCYTES # BLD AUTO: 0.79 K/UL
MONOCYTES NFR BLD AUTO: 11.8 %
NEUTROPHILS # BLD AUTO: 4.83 K/UL
NEUTROPHILS NFR BLD AUTO: 71.9 %
NITRITE URINE: NEGATIVE
PH URINE: 7.5
PLATELET # BLD AUTO: 219 K/UL
POTASSIUM SERPL-SCNC: 4.2 MMOL/L
PROT SERPL-MCNC: 7.5 G/DL
PROTEIN URINE: NEGATIVE
RBC # BLD: 3.74 M/UL
RBC # FLD: 13.6 %
RED BLOOD CELLS URINE: 2 /HPF
SODIUM SERPL-SCNC: 139 MMOL/L
SPECIFIC GRAVITY URINE: 1.01
SQUAMOUS EPITHELIAL CELLS: 0 /HPF
UROBILINOGEN URINE: NORMAL
WBC # FLD AUTO: 6.72 K/UL
WHITE BLOOD CELLS URINE: 0 /HPF

## 2023-01-01 ENCOUNTER — RX RENEWAL (OUTPATIENT)
Age: 88
End: 2023-01-01

## 2023-01-01 ENCOUNTER — TRANSCRIPTION ENCOUNTER (OUTPATIENT)
Age: 88
End: 2023-01-01

## 2023-01-01 ENCOUNTER — INPATIENT (INPATIENT)
Facility: HOSPITAL | Age: 88
LOS: 0 days | Discharge: HOME HEALTH | DRG: 189 | End: 2023-10-09
Attending: GENERAL PRACTICE | Admitting: GENERAL PRACTICE
Payer: MEDICARE

## 2023-01-01 ENCOUNTER — APPOINTMENT (OUTPATIENT)
Dept: INTERNAL MEDICINE | Facility: CLINIC | Age: 88
End: 2023-01-01
Payer: COMMERCIAL

## 2023-01-01 ENCOUNTER — NON-APPOINTMENT (OUTPATIENT)
Age: 88
End: 2023-01-01

## 2023-01-01 ENCOUNTER — APPOINTMENT (OUTPATIENT)
Dept: INTERNAL MEDICINE | Facility: CLINIC | Age: 88
End: 2023-01-01
Payer: MEDICAID

## 2023-01-01 ENCOUNTER — APPOINTMENT (OUTPATIENT)
Dept: INTERNAL MEDICINE | Facility: CLINIC | Age: 88
End: 2023-01-01

## 2023-01-01 ENCOUNTER — INPATIENT (INPATIENT)
Facility: HOSPITAL | Age: 88
LOS: 8 days | Discharge: NURSING, REHAB OR LONG-TERM CARE | DRG: 964 | End: 2023-11-14
Attending: GENERAL PRACTICE | Admitting: GENERAL PRACTICE
Payer: MEDICARE

## 2023-01-01 VITALS
DIASTOLIC BLOOD PRESSURE: 67 MMHG | SYSTOLIC BLOOD PRESSURE: 119 MMHG | RESPIRATION RATE: 18 BRPM | OXYGEN SATURATION: 96 % | HEART RATE: 67 BPM | TEMPERATURE: 98 F

## 2023-01-01 VITALS
SYSTOLIC BLOOD PRESSURE: 137 MMHG | TEMPERATURE: 98 F | RESPIRATION RATE: 19 BRPM | OXYGEN SATURATION: 97 % | WEIGHT: 113.98 LBS | DIASTOLIC BLOOD PRESSURE: 82 MMHG | HEART RATE: 59 BPM

## 2023-01-01 VITALS — WEIGHT: 120 LBS | HEIGHT: 60 IN | BODY MASS INDEX: 23.56 KG/M2

## 2023-01-01 VITALS — SYSTOLIC BLOOD PRESSURE: 120 MMHG | DIASTOLIC BLOOD PRESSURE: 70 MMHG

## 2023-01-01 VITALS
SYSTOLIC BLOOD PRESSURE: 143 MMHG | WEIGHT: 130.07 LBS | DIASTOLIC BLOOD PRESSURE: 82 MMHG | RESPIRATION RATE: 17 BRPM | OXYGEN SATURATION: 91 % | TEMPERATURE: 97 F | HEART RATE: 66 BPM

## 2023-01-01 VITALS — DIASTOLIC BLOOD PRESSURE: 70 MMHG | SYSTOLIC BLOOD PRESSURE: 120 MMHG

## 2023-01-01 VITALS — OXYGEN SATURATION: 95 % | RESPIRATION RATE: 17 BRPM

## 2023-01-01 VITALS — BODY MASS INDEX: 23.75 KG/M2 | HEIGHT: 60 IN | WEIGHT: 121 LBS

## 2023-01-01 VITALS — BODY MASS INDEX: 25.32 KG/M2 | WEIGHT: 129 LBS | HEIGHT: 60 IN

## 2023-01-01 DIAGNOSIS — Z98.49 CATARACT EXTRACTION STATUS, UNSPECIFIED EYE: Chronic | ICD-10-CM

## 2023-01-01 DIAGNOSIS — I48.20 CHRONIC ATRIAL FIBRILLATION, UNSPECIFIED: ICD-10-CM
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
## 2023-01-01 DIAGNOSIS — J96.01 ACUTE RESPIRATORY FAILURE WITH HYPOXIA: ICD-10-CM

## 2023-01-01 DIAGNOSIS — R00.1 BRADYCARDIA, UNSPECIFIED: ICD-10-CM

## 2023-01-01 DIAGNOSIS — F03.90 UNSPECIFIED DEMENTIA WITHOUT BEHAVIORAL DISTURBANCE: ICD-10-CM

## 2023-01-01 DIAGNOSIS — K21.9 GASTRO-ESOPHAGEAL REFLUX DISEASE WITHOUT ESOPHAGITIS: ICD-10-CM

## 2023-01-01 DIAGNOSIS — Z98.89 OTHER SPECIFIED POSTPROCEDURAL STATES: Chronic | ICD-10-CM

## 2023-01-01 DIAGNOSIS — E78.5 HYPERLIPIDEMIA, UNSPECIFIED: ICD-10-CM

## 2023-01-01 DIAGNOSIS — I47.10 SUPRAVENTRICULAR TACHYCARDIA, UNSPECIFIED: ICD-10-CM

## 2023-01-01 DIAGNOSIS — I10 ESSENTIAL (PRIMARY) HYPERTENSION: ICD-10-CM

## 2023-01-01 DIAGNOSIS — Z87.81 PERSONAL HISTORY OF (HEALED) TRAUMATIC FRACTURE: Chronic | ICD-10-CM

## 2023-01-01 DIAGNOSIS — E87.1 HYPO-OSMOLALITY AND HYPONATREMIA: ICD-10-CM

## 2023-01-01 DIAGNOSIS — I48.0 PAROXYSMAL ATRIAL FIBRILLATION: ICD-10-CM

## 2023-01-01 DIAGNOSIS — S32.9XXA FRACTURE OF UNSPECIFIED PARTS OF LUMBOSACRAL SPINE AND PELVIS, INITIAL ENCOUNTER FOR CLOSED FRACTURE: ICD-10-CM

## 2023-01-01 DIAGNOSIS — K59.00 CONSTIPATION, UNSPECIFIED: ICD-10-CM

## 2023-01-01 DIAGNOSIS — E78.00 PURE HYPERCHOLESTEROLEMIA, UNSPECIFIED: ICD-10-CM

## 2023-01-01 DIAGNOSIS — S70.01XA CONTUSION OF RIGHT HIP, INITIAL ENCOUNTER: ICD-10-CM

## 2023-01-01 DIAGNOSIS — S72.001A FRACTURE OF UNSPECIFIED PART OF NECK OF RIGHT FEMUR, INITIAL ENCOUNTER FOR CLOSED FRACTURE: ICD-10-CM

## 2023-01-01 DIAGNOSIS — I47.19 OTHER SUPRAVENTRICULAR TACHYCARDIA: ICD-10-CM

## 2023-01-01 DIAGNOSIS — Z79.01 LONG TERM (CURRENT) USE OF ANTICOAGULANTS: ICD-10-CM

## 2023-01-01 LAB
-  AMOXICILLIN/CLAVULANIC ACID: SIGNIFICANT CHANGE UP
-  AMOXICILLIN/CLAVULANIC ACID: SIGNIFICANT CHANGE UP
-  AMPICILLIN/SULBACTAM: SIGNIFICANT CHANGE UP
-  AMPICILLIN/SULBACTAM: SIGNIFICANT CHANGE UP
-  AMPICILLIN: SIGNIFICANT CHANGE UP
-  AMPICILLIN: SIGNIFICANT CHANGE UP
-  AZTREONAM: SIGNIFICANT CHANGE UP
-  AZTREONAM: SIGNIFICANT CHANGE UP
-  CEFAZOLIN: SIGNIFICANT CHANGE UP
-  CEFAZOLIN: SIGNIFICANT CHANGE UP
-  CEFEPIME: SIGNIFICANT CHANGE UP
-  CEFEPIME: SIGNIFICANT CHANGE UP
-  CEFOXITIN: SIGNIFICANT CHANGE UP
-  CEFOXITIN: SIGNIFICANT CHANGE UP
-  CEFTRIAXONE: SIGNIFICANT CHANGE UP
-  CEFTRIAXONE: SIGNIFICANT CHANGE UP
-  CEFUROXIME: SIGNIFICANT CHANGE UP
-  CEFUROXIME: SIGNIFICANT CHANGE UP
-  CIPROFLOXACIN: SIGNIFICANT CHANGE UP
-  CIPROFLOXACIN: SIGNIFICANT CHANGE UP
-  ERTAPENEM: SIGNIFICANT CHANGE UP
-  ERTAPENEM: SIGNIFICANT CHANGE UP
-  GENTAMICIN: SIGNIFICANT CHANGE UP
-  GENTAMICIN: SIGNIFICANT CHANGE UP
-  IMIPENEM: SIGNIFICANT CHANGE UP
-  IMIPENEM: SIGNIFICANT CHANGE UP
-  LEVOFLOXACIN: SIGNIFICANT CHANGE UP
-  LEVOFLOXACIN: SIGNIFICANT CHANGE UP
-  MEROPENEM: SIGNIFICANT CHANGE UP
-  MEROPENEM: SIGNIFICANT CHANGE UP
-  NITROFURANTOIN: SIGNIFICANT CHANGE UP
-  NITROFURANTOIN: SIGNIFICANT CHANGE UP
-  PIPERACILLIN/TAZOBACTAM: SIGNIFICANT CHANGE UP
-  PIPERACILLIN/TAZOBACTAM: SIGNIFICANT CHANGE UP
-  TOBRAMYCIN: SIGNIFICANT CHANGE UP
-  TOBRAMYCIN: SIGNIFICANT CHANGE UP
-  TRIMETHOPRIM/SULFAMETHOXAZOLE: SIGNIFICANT CHANGE UP
-  TRIMETHOPRIM/SULFAMETHOXAZOLE: SIGNIFICANT CHANGE UP
24R-OH-CALCIDIOL SERPL-MCNC: 35.8 NG/ML — SIGNIFICANT CHANGE UP (ref 30–80)
24R-OH-CALCIDIOL SERPL-MCNC: 35.8 NG/ML — SIGNIFICANT CHANGE UP (ref 30–80)
A1C WITH ESTIMATED AVERAGE GLUCOSE RESULT: 5.5 % — SIGNIFICANT CHANGE UP (ref 4–5.6)
A1C WITH ESTIMATED AVERAGE GLUCOSE RESULT: 5.5 % — SIGNIFICANT CHANGE UP (ref 4–5.6)
ALBUMIN SERPL ELPH-MCNC: 3.3 G/DL — SIGNIFICANT CHANGE UP (ref 3.3–5)
ALBUMIN SERPL ELPH-MCNC: 3.4 G/DL — SIGNIFICANT CHANGE UP (ref 3.3–5)
ALBUMIN SERPL ELPH-MCNC: 3.6 G/DL — SIGNIFICANT CHANGE UP (ref 3.3–5)
ALBUMIN SERPL ELPH-MCNC: 3.8 G/DL
ALBUMIN SERPL ELPH-MCNC: 4 G/DL
ALP BLD-CCNC: 102 U/L
ALP BLD-CCNC: 146 U/L
ALP BLD-CCNC: 151 U/L
ALP BLD-CCNC: 171 U/L
ALP SERPL-CCNC: 101 U/L — SIGNIFICANT CHANGE UP (ref 40–120)
ALP SERPL-CCNC: 104 U/L — SIGNIFICANT CHANGE UP (ref 40–120)
ALP SERPL-CCNC: 125 U/L — HIGH (ref 40–120)
ALP SERPL-CCNC: 125 U/L — HIGH (ref 40–120)
ALP SERPL-CCNC: 136 U/L — HIGH (ref 40–120)
ALP SERPL-CCNC: 136 U/L — HIGH (ref 40–120)
ALP SERPL-CCNC: 94 U/L — SIGNIFICANT CHANGE UP (ref 40–120)
ALP SERPL-CCNC: 97 U/L — SIGNIFICANT CHANGE UP (ref 40–120)
ALT FLD-CCNC: 106 U/L — HIGH (ref 10–45)
ALT FLD-CCNC: 107 U/L — HIGH (ref 10–45)
ALT FLD-CCNC: 120 U/L — HIGH (ref 10–45)
ALT FLD-CCNC: 42 U/L — SIGNIFICANT CHANGE UP (ref 10–45)
ALT FLD-CCNC: 42 U/L — SIGNIFICANT CHANGE UP (ref 10–45)
ALT FLD-CCNC: 44 U/L — SIGNIFICANT CHANGE UP (ref 10–45)
ALT FLD-CCNC: 44 U/L — SIGNIFICANT CHANGE UP (ref 10–45)
ALT FLD-CCNC: 92 U/L — HIGH (ref 10–45)
ALT SERPL-CCNC: 31 U/L
ALT SERPL-CCNC: 32 U/L
ALT SERPL-CCNC: 36 U/L
ALT SERPL-CCNC: 42 U/L
AMIODARONE FLD-MCNC: 938 NG/ML — LOW (ref 1000–2500)
AMIODARONE+DESETH SERPL-MCNC: 1025 NG/ML — SIGNIFICANT CHANGE UP
ANION GAP SERPL CALC-SCNC: 11 MMOL/L — SIGNIFICANT CHANGE UP (ref 5–17)
ANION GAP SERPL CALC-SCNC: 12 MMOL/L
ANION GAP SERPL CALC-SCNC: 12 MMOL/L — SIGNIFICANT CHANGE UP (ref 5–17)
ANION GAP SERPL CALC-SCNC: 12 MMOL/L — SIGNIFICANT CHANGE UP (ref 5–17)
ANION GAP SERPL CALC-SCNC: 13 MMOL/L
ANION GAP SERPL CALC-SCNC: 13 MMOL/L — SIGNIFICANT CHANGE UP (ref 5–17)
ANION GAP SERPL CALC-SCNC: 13 MMOL/L — SIGNIFICANT CHANGE UP (ref 5–17)
ANION GAP SERPL CALC-SCNC: 15 MMOL/L — SIGNIFICANT CHANGE UP (ref 5–17)
ANION GAP SERPL CALC-SCNC: 17 MMOL/L — SIGNIFICANT CHANGE UP (ref 5–17)
ANION GAP SERPL CALC-SCNC: 17 MMOL/L — SIGNIFICANT CHANGE UP (ref 5–17)
ANION GAP SERPL CALC-SCNC: 8 MMOL/L — SIGNIFICANT CHANGE UP (ref 5–17)
ANION GAP SERPL CALC-SCNC: 8 MMOL/L — SIGNIFICANT CHANGE UP (ref 5–17)
ANION GAP SERPL CALC-SCNC: 9 MMOL/L — SIGNIFICANT CHANGE UP (ref 5–17)
ANION GAP SERPL CALC-SCNC: 9 MMOL/L — SIGNIFICANT CHANGE UP (ref 5–17)
APPEARANCE UR: ABNORMAL
APPEARANCE UR: ABNORMAL
APPEARANCE UR: CLEAR — SIGNIFICANT CHANGE UP
APPEARANCE: CLEAR
APPEARANCE: CLEAR
APTT BLD: 31.2 SEC — SIGNIFICANT CHANGE UP (ref 24.5–35.6)
APTT BLD: 31.2 SEC — SIGNIFICANT CHANGE UP (ref 24.5–35.6)
APTT BLD: 31.5 SEC — SIGNIFICANT CHANGE UP (ref 24.5–35.6)
APTT BLD: 31.5 SEC — SIGNIFICANT CHANGE UP (ref 24.5–35.6)
AST SERPL-CCNC: 102 U/L — HIGH (ref 10–40)
AST SERPL-CCNC: 115 U/L — HIGH (ref 10–40)
AST SERPL-CCNC: 28 U/L
AST SERPL-CCNC: 29 U/L
AST SERPL-CCNC: 31 U/L
AST SERPL-CCNC: 34 U/L
AST SERPL-CCNC: 43 U/L — HIGH (ref 10–40)
AST SERPL-CCNC: 43 U/L — HIGH (ref 10–40)
AST SERPL-CCNC: 58 U/L — HIGH (ref 10–40)
AST SERPL-CCNC: 61 U/L — HIGH (ref 10–40)
AST SERPL-CCNC: 61 U/L — HIGH (ref 10–40)
AST SERPL-CCNC: 87 U/L — HIGH (ref 10–40)
BACTERIA # UR AUTO: ABNORMAL /HPF
BACTERIA # UR AUTO: ABNORMAL /HPF
BACTERIA # UR AUTO: NEGATIVE /HPF — SIGNIFICANT CHANGE UP
BACTERIA # UR AUTO: NEGATIVE /HPF — SIGNIFICANT CHANGE UP
BACTERIA # UR AUTO: NEGATIVE — SIGNIFICANT CHANGE UP
BACTERIA: NEGATIVE
BACTERIA: NEGATIVE /HPF
BASE EXCESS BLDV CALC-SCNC: 1 MMOL/L — SIGNIFICANT CHANGE UP (ref -2–3)
BASE EXCESS BLDV CALC-SCNC: 1 MMOL/L — SIGNIFICANT CHANGE UP (ref -2–3)
BASE EXCESS BLDV CALC-SCNC: 1.8 MMOL/L — SIGNIFICANT CHANGE UP (ref -2–3)
BASE EXCESS BLDV CALC-SCNC: 1.8 MMOL/L — SIGNIFICANT CHANGE UP (ref -2–3)
BASE EXCESS BLDV CALC-SCNC: 5.4 MMOL/L — HIGH (ref -2–3)
BASOPHILS # BLD AUTO: 0.01 K/UL
BASOPHILS # BLD AUTO: 0.02 K/UL
BASOPHILS # BLD AUTO: 0.02 K/UL
BASOPHILS # BLD AUTO: 0.02 K/UL — SIGNIFICANT CHANGE UP (ref 0–0.2)
BASOPHILS # BLD AUTO: 0.03 K/UL — SIGNIFICANT CHANGE UP (ref 0–0.2)
BASOPHILS NFR BLD AUTO: 0.1 %
BASOPHILS NFR BLD AUTO: 0.2 % — SIGNIFICANT CHANGE UP (ref 0–2)
BASOPHILS NFR BLD AUTO: 0.3 %
BASOPHILS NFR BLD AUTO: 0.3 %
BASOPHILS NFR BLD AUTO: 0.3 % — SIGNIFICANT CHANGE UP (ref 0–2)
BILIRUB SERPL-MCNC: 0.4 MG/DL
BILIRUB SERPL-MCNC: 0.5 MG/DL
BILIRUB SERPL-MCNC: 0.6 MG/DL — SIGNIFICANT CHANGE UP (ref 0.2–1.2)
BILIRUB SERPL-MCNC: 0.8 MG/DL — SIGNIFICANT CHANGE UP (ref 0.2–1.2)
BILIRUB SERPL-MCNC: 0.8 MG/DL — SIGNIFICANT CHANGE UP (ref 0.2–1.2)
BILIRUB SERPL-MCNC: 1 MG/DL — SIGNIFICANT CHANGE UP (ref 0.2–1.2)
BILIRUB SERPL-MCNC: 1.1 MG/DL — SIGNIFICANT CHANGE UP (ref 0.2–1.2)
BILIRUB SERPL-MCNC: 1.1 MG/DL — SIGNIFICANT CHANGE UP (ref 0.2–1.2)
BILIRUB SERPL-MCNC: 1.2 MG/DL — SIGNIFICANT CHANGE UP (ref 0.2–1.2)
BILIRUB SERPL-MCNC: 1.2 MG/DL — SIGNIFICANT CHANGE UP (ref 0.2–1.2)
BILIRUB UR-MCNC: NEGATIVE — SIGNIFICANT CHANGE UP
BILIRUBIN URINE: NEGATIVE
BILIRUBIN URINE: NEGATIVE
BLOOD URINE: NEGATIVE
BLOOD URINE: NEGATIVE
BUN SERPL-MCNC: 17 MG/DL
BUN SERPL-MCNC: 17 MG/DL — SIGNIFICANT CHANGE UP (ref 7–23)
BUN SERPL-MCNC: 17 MG/DL — SIGNIFICANT CHANGE UP (ref 7–23)
BUN SERPL-MCNC: 18 MG/DL
BUN SERPL-MCNC: 18 MG/DL — SIGNIFICANT CHANGE UP (ref 7–23)
BUN SERPL-MCNC: 22 MG/DL
BUN SERPL-MCNC: 22 MG/DL — SIGNIFICANT CHANGE UP (ref 7–23)
BUN SERPL-MCNC: 23 MG/DL — SIGNIFICANT CHANGE UP (ref 7–23)
BUN SERPL-MCNC: 23 MG/DL — SIGNIFICANT CHANGE UP (ref 7–23)
BUN SERPL-MCNC: 25 MG/DL — HIGH (ref 7–23)
BUN SERPL-MCNC: 26 MG/DL — HIGH (ref 7–23)
BUN SERPL-MCNC: 26 MG/DL — HIGH (ref 7–23)
BUN SERPL-MCNC: 28 MG/DL
CA-I SERPL-SCNC: 1.09 MMOL/L — LOW (ref 1.15–1.33)
CA-I SERPL-SCNC: 1.17 MMOL/L — SIGNIFICANT CHANGE UP (ref 1.15–1.33)
CA-I SERPL-SCNC: 1.17 MMOL/L — SIGNIFICANT CHANGE UP (ref 1.15–1.33)
CALCIUM SERPL-MCNC: 8.6 MG/DL — SIGNIFICANT CHANGE UP (ref 8.4–10.5)
CALCIUM SERPL-MCNC: 8.8 MG/DL — SIGNIFICANT CHANGE UP (ref 8.4–10.5)
CALCIUM SERPL-MCNC: 8.8 MG/DL — SIGNIFICANT CHANGE UP (ref 8.4–10.5)
CALCIUM SERPL-MCNC: 9 MG/DL
CALCIUM SERPL-MCNC: 9 MG/DL — SIGNIFICANT CHANGE UP (ref 8.4–10.5)
CALCIUM SERPL-MCNC: 9.1 MG/DL — SIGNIFICANT CHANGE UP (ref 8.4–10.5)
CALCIUM SERPL-MCNC: 9.2 MG/DL — SIGNIFICANT CHANGE UP (ref 8.4–10.5)
CALCIUM SERPL-MCNC: 9.3 MG/DL
CALCIUM SERPL-MCNC: 9.3 MG/DL — SIGNIFICANT CHANGE UP (ref 8.4–10.5)
CALCIUM SERPL-MCNC: 9.5 MG/DL
CALCIUM SERPL-MCNC: 9.7 MG/DL
CAST: 0 /LPF
CAST: 0 /LPF — SIGNIFICANT CHANGE UP (ref 0–4)
CHLORIDE BLDV-SCNC: 100 MMOL/L — SIGNIFICANT CHANGE UP (ref 96–108)
CHLORIDE BLDV-SCNC: 104 MMOL/L — SIGNIFICANT CHANGE UP (ref 96–108)
CHLORIDE BLDV-SCNC: 104 MMOL/L — SIGNIFICANT CHANGE UP (ref 96–108)
CHLORIDE BLDV-SCNC: 98 MMOL/L — SIGNIFICANT CHANGE UP (ref 96–108)
CHLORIDE BLDV-SCNC: 98 MMOL/L — SIGNIFICANT CHANGE UP (ref 96–108)
CHLORIDE SERPL-SCNC: 101 MMOL/L
CHLORIDE SERPL-SCNC: 101 MMOL/L — SIGNIFICANT CHANGE UP (ref 96–108)
CHLORIDE SERPL-SCNC: 101 MMOL/L — SIGNIFICANT CHANGE UP (ref 96–108)
CHLORIDE SERPL-SCNC: 104 MMOL/L
CHLORIDE SERPL-SCNC: 104 MMOL/L
CHLORIDE SERPL-SCNC: 104 MMOL/L — SIGNIFICANT CHANGE UP (ref 96–108)
CHLORIDE SERPL-SCNC: 104 MMOL/L — SIGNIFICANT CHANGE UP (ref 96–108)
CHLORIDE SERPL-SCNC: 105 MMOL/L — SIGNIFICANT CHANGE UP (ref 96–108)
CHLORIDE SERPL-SCNC: 105 MMOL/L — SIGNIFICANT CHANGE UP (ref 96–108)
CHLORIDE SERPL-SCNC: 106 MMOL/L — SIGNIFICANT CHANGE UP (ref 96–108)
CHLORIDE SERPL-SCNC: 106 MMOL/L — SIGNIFICANT CHANGE UP (ref 96–108)
CHLORIDE SERPL-SCNC: 96 MMOL/L — SIGNIFICANT CHANGE UP (ref 96–108)
CHLORIDE SERPL-SCNC: 96 MMOL/L — SIGNIFICANT CHANGE UP (ref 96–108)
CHLORIDE SERPL-SCNC: 97 MMOL/L
CHLORIDE SERPL-SCNC: 97 MMOL/L — SIGNIFICANT CHANGE UP (ref 96–108)
CHLORIDE SERPL-SCNC: 97 MMOL/L — SIGNIFICANT CHANGE UP (ref 96–108)
CHLORIDE SERPL-SCNC: 99 MMOL/L — SIGNIFICANT CHANGE UP (ref 96–108)
CHLORIDE SERPL-SCNC: 99 MMOL/L — SIGNIFICANT CHANGE UP (ref 96–108)
CHOLEST SERPL-MCNC: 124 MG/DL — SIGNIFICANT CHANGE UP
CHOLEST SERPL-MCNC: 124 MG/DL — SIGNIFICANT CHANGE UP
CHOLEST SERPL-MCNC: 129 MG/DL — SIGNIFICANT CHANGE UP
CO2 BLDV-SCNC: 27 MMOL/L — HIGH (ref 22–26)
CO2 BLDV-SCNC: 27 MMOL/L — HIGH (ref 22–26)
CO2 BLDV-SCNC: 29 MMOL/L — HIGH (ref 22–26)
CO2 BLDV-SCNC: 29 MMOL/L — HIGH (ref 22–26)
CO2 BLDV-SCNC: 33 MMOL/L — HIGH (ref 22–26)
CO2 SERPL-SCNC: 20 MMOL/L — LOW (ref 22–31)
CO2 SERPL-SCNC: 20 MMOL/L — LOW (ref 22–31)
CO2 SERPL-SCNC: 21 MMOL/L — LOW (ref 22–31)
CO2 SERPL-SCNC: 22 MMOL/L
CO2 SERPL-SCNC: 22 MMOL/L — SIGNIFICANT CHANGE UP (ref 22–31)
CO2 SERPL-SCNC: 24 MMOL/L
CO2 SERPL-SCNC: 24 MMOL/L — SIGNIFICANT CHANGE UP (ref 22–31)
CO2 SERPL-SCNC: 25 MMOL/L
CO2 SERPL-SCNC: 25 MMOL/L
CO2 SERPL-SCNC: 25 MMOL/L — SIGNIFICANT CHANGE UP (ref 22–31)
CO2 SERPL-SCNC: 26 MMOL/L — SIGNIFICANT CHANGE UP (ref 22–31)
CO2 SERPL-SCNC: 26 MMOL/L — SIGNIFICANT CHANGE UP (ref 22–31)
COLOR SPEC: SIGNIFICANT CHANGE UP
COLOR SPEC: YELLOW — SIGNIFICANT CHANGE UP
COLOR: YELLOW
COLOR: YELLOW
CREAT ?TM UR-MCNC: 29 MG/DL — SIGNIFICANT CHANGE UP
CREAT ?TM UR-MCNC: 29 MG/DL — SIGNIFICANT CHANGE UP
CREAT SERPL-MCNC: 0.87 MG/DL — SIGNIFICANT CHANGE UP (ref 0.5–1.3)
CREAT SERPL-MCNC: 0.89 MG/DL — SIGNIFICANT CHANGE UP (ref 0.5–1.3)
CREAT SERPL-MCNC: 0.91 MG/DL — SIGNIFICANT CHANGE UP (ref 0.5–1.3)
CREAT SERPL-MCNC: 0.92 MG/DL — SIGNIFICANT CHANGE UP (ref 0.5–1.3)
CREAT SERPL-MCNC: 0.92 MG/DL — SIGNIFICANT CHANGE UP (ref 0.5–1.3)
CREAT SERPL-MCNC: 0.93 MG/DL
CREAT SERPL-MCNC: 0.93 MG/DL — SIGNIFICANT CHANGE UP (ref 0.5–1.3)
CREAT SERPL-MCNC: 0.93 MG/DL — SIGNIFICANT CHANGE UP (ref 0.5–1.3)
CREAT SERPL-MCNC: 0.94 MG/DL — SIGNIFICANT CHANGE UP (ref 0.5–1.3)
CREAT SERPL-MCNC: 0.94 MG/DL — SIGNIFICANT CHANGE UP (ref 0.5–1.3)
CREAT SERPL-MCNC: 0.95 MG/DL — SIGNIFICANT CHANGE UP (ref 0.5–1.3)
CREAT SERPL-MCNC: 0.99 MG/DL
CREAT SERPL-MCNC: 1.02 MG/DL
CREAT SERPL-MCNC: 1.13 MG/DL
CREAT SERPL-MCNC: 1.21 MG/DL — SIGNIFICANT CHANGE UP (ref 0.5–1.3)
CULTURE RESULTS: ABNORMAL
CULTURE RESULTS: ABNORMAL
CULTURE RESULTS: SIGNIFICANT CHANGE UP
DIFF PNL FLD: ABNORMAL
DIFF PNL FLD: ABNORMAL
DIFF PNL FLD: NEGATIVE — SIGNIFICANT CHANGE UP
EGFR: 42 ML/MIN/1.73M2 — LOW
EGFR: 45 ML/MIN/1.73M2
EGFR: 51 ML/MIN/1.73M2
EGFR: 53 ML/MIN/1.73M2
EGFR: 56 ML/MIN/1.73M2 — LOW
EGFR: 57 ML/MIN/1.73M2
EGFR: 57 ML/MIN/1.73M2 — LOW
EGFR: 57 ML/MIN/1.73M2 — LOW
EGFR: 58 ML/MIN/1.73M2 — LOW
EGFR: 60 ML/MIN/1.73M2 — SIGNIFICANT CHANGE UP
EGFR: 62 ML/MIN/1.73M2 — SIGNIFICANT CHANGE UP
EOSINOPHIL # BLD AUTO: 0.07 K/UL
EOSINOPHIL # BLD AUTO: 0.07 K/UL
EOSINOPHIL # BLD AUTO: 0.1 K/UL — SIGNIFICANT CHANGE UP (ref 0–0.5)
EOSINOPHIL # BLD AUTO: 0.1 K/UL — SIGNIFICANT CHANGE UP (ref 0–0.5)
EOSINOPHIL # BLD AUTO: 0.11 K/UL
EOSINOPHIL # BLD AUTO: 0.13 K/UL — SIGNIFICANT CHANGE UP (ref 0–0.5)
EOSINOPHIL # BLD AUTO: 0.18 K/UL — SIGNIFICANT CHANGE UP (ref 0–0.5)
EOSINOPHIL # BLD AUTO: 0.18 K/UL — SIGNIFICANT CHANGE UP (ref 0–0.5)
EOSINOPHIL NFR BLD AUTO: 1 %
EOSINOPHIL NFR BLD AUTO: 1 %
EOSINOPHIL NFR BLD AUTO: 1.1 % — SIGNIFICANT CHANGE UP (ref 0–6)
EOSINOPHIL NFR BLD AUTO: 1.1 % — SIGNIFICANT CHANGE UP (ref 0–6)
EOSINOPHIL NFR BLD AUTO: 1.4 %
EOSINOPHIL NFR BLD AUTO: 1.5 % — SIGNIFICANT CHANGE UP (ref 0–6)
EOSINOPHIL NFR BLD AUTO: 2.1 % — SIGNIFICANT CHANGE UP (ref 0–6)
EOSINOPHIL NFR BLD AUTO: 2.1 % — SIGNIFICANT CHANGE UP (ref 0–6)
EPI CELLS # UR: 0 /HPF — SIGNIFICANT CHANGE UP
EPITHELIAL CELLS: 1 /HPF
ESTIMATED AVERAGE GLUCOSE: 111 MG/DL — SIGNIFICANT CHANGE UP (ref 68–114)
ESTIMATED AVERAGE GLUCOSE: 111 MG/DL — SIGNIFICANT CHANGE UP (ref 68–114)
FERRITIN SERPL-MCNC: 186 NG/ML — SIGNIFICANT CHANGE UP (ref 13–330)
FERRITIN SERPL-MCNC: 186 NG/ML — SIGNIFICANT CHANGE UP (ref 13–330)
FLUAV AG NPH QL: SIGNIFICANT CHANGE UP
FLUAV AG NPH QL: SIGNIFICANT CHANGE UP
FLUBV AG NPH QL: SIGNIFICANT CHANGE UP
FLUBV AG NPH QL: SIGNIFICANT CHANGE UP
FOLATE SERPL-MCNC: >20 NG/ML — SIGNIFICANT CHANGE UP
FOLATE SERPL-MCNC: >20 NG/ML — SIGNIFICANT CHANGE UP
GAS PNL BLDV: 129 MMOL/L — LOW (ref 136–145)
GAS PNL BLDV: 133 MMOL/L — LOW (ref 136–145)
GAS PNL BLDV: 133 MMOL/L — LOW (ref 136–145)
GAS PNL BLDV: 134 MMOL/L — LOW (ref 136–145)
GAS PNL BLDV: 134 MMOL/L — LOW (ref 136–145)
GAS PNL BLDV: SIGNIFICANT CHANGE UP
GLUCOSE BLDV-MCNC: 101 MG/DL — HIGH (ref 70–99)
GLUCOSE BLDV-MCNC: 142 MG/DL — HIGH (ref 70–99)
GLUCOSE BLDV-MCNC: 142 MG/DL — HIGH (ref 70–99)
GLUCOSE BLDV-MCNC: 87 MG/DL — SIGNIFICANT CHANGE UP (ref 70–99)
GLUCOSE BLDV-MCNC: 87 MG/DL — SIGNIFICANT CHANGE UP (ref 70–99)
GLUCOSE QUALITATIVE U: NEGATIVE
GLUCOSE QUALITATIVE U: NEGATIVE MG/DL
GLUCOSE SERPL-MCNC: 102 MG/DL — HIGH (ref 70–99)
GLUCOSE SERPL-MCNC: 107 MG/DL
GLUCOSE SERPL-MCNC: 108 MG/DL — HIGH (ref 70–99)
GLUCOSE SERPL-MCNC: 108 MG/DL — HIGH (ref 70–99)
GLUCOSE SERPL-MCNC: 110 MG/DL
GLUCOSE SERPL-MCNC: 110 MG/DL — HIGH (ref 70–99)
GLUCOSE SERPL-MCNC: 110 MG/DL — HIGH (ref 70–99)
GLUCOSE SERPL-MCNC: 111 MG/DL — HIGH (ref 70–99)
GLUCOSE SERPL-MCNC: 111 MG/DL — HIGH (ref 70–99)
GLUCOSE SERPL-MCNC: 122 MG/DL
GLUCOSE SERPL-MCNC: 140 MG/DL — HIGH (ref 70–99)
GLUCOSE SERPL-MCNC: 140 MG/DL — HIGH (ref 70–99)
GLUCOSE SERPL-MCNC: 83 MG/DL — SIGNIFICANT CHANGE UP (ref 70–99)
GLUCOSE SERPL-MCNC: 93 MG/DL
GLUCOSE SERPL-MCNC: 93 MG/DL — SIGNIFICANT CHANGE UP (ref 70–99)
GLUCOSE SERPL-MCNC: 95 MG/DL — SIGNIFICANT CHANGE UP (ref 70–99)
GLUCOSE SERPL-MCNC: 98 MG/DL — SIGNIFICANT CHANGE UP (ref 70–99)
GLUCOSE SERPL-MCNC: 98 MG/DL — SIGNIFICANT CHANGE UP (ref 70–99)
GLUCOSE UR QL: NEGATIVE MG/DL — SIGNIFICANT CHANGE UP
GLUCOSE UR QL: NEGATIVE — SIGNIFICANT CHANGE UP
HAPTOGLOB SERPL-MCNC: 209 MG/DL — HIGH (ref 34–200)
HAPTOGLOB SERPL-MCNC: 209 MG/DL — HIGH (ref 34–200)
HCO3 BLDV-SCNC: 26 MMOL/L — SIGNIFICANT CHANGE UP (ref 22–29)
HCO3 BLDV-SCNC: 26 MMOL/L — SIGNIFICANT CHANGE UP (ref 22–29)
HCO3 BLDV-SCNC: 27 MMOL/L — SIGNIFICANT CHANGE UP (ref 22–29)
HCO3 BLDV-SCNC: 27 MMOL/L — SIGNIFICANT CHANGE UP (ref 22–29)
HCO3 BLDV-SCNC: 31 MMOL/L — HIGH (ref 22–29)
HCT VFR BLD CALC: 28.8 % — LOW (ref 34.5–45)
HCT VFR BLD CALC: 28.8 % — LOW (ref 34.5–45)
HCT VFR BLD CALC: 29.6 % — LOW (ref 34.5–45)
HCT VFR BLD CALC: 29.6 % — LOW (ref 34.5–45)
HCT VFR BLD CALC: 30.1 % — LOW (ref 34.5–45)
HCT VFR BLD CALC: 30.1 % — LOW (ref 34.5–45)
HCT VFR BLD CALC: 31.6 % — LOW (ref 34.5–45)
HCT VFR BLD CALC: 31.6 % — LOW (ref 34.5–45)
HCT VFR BLD CALC: 32.1 % — LOW (ref 34.5–45)
HCT VFR BLD CALC: 32.1 % — LOW (ref 34.5–45)
HCT VFR BLD CALC: 33.6 % — LOW (ref 34.5–45)
HCT VFR BLD CALC: 33.6 % — LOW (ref 34.5–45)
HCT VFR BLD CALC: 33.7 %
HCT VFR BLD CALC: 34.8 %
HCT VFR BLD CALC: 35.1 % — SIGNIFICANT CHANGE UP (ref 34.5–45)
HCT VFR BLD CALC: 37.8 % — SIGNIFICANT CHANGE UP (ref 34.5–45)
HCT VFR BLD CALC: 38.7 %
HCT VFR BLDA CALC: 29 % — LOW (ref 34.5–46.5)
HCT VFR BLDA CALC: 29 % — LOW (ref 34.5–46.5)
HCT VFR BLDA CALC: 32 % — LOW (ref 34.5–46.5)
HCT VFR BLDA CALC: 32 % — LOW (ref 34.5–46.5)
HCT VFR BLDA CALC: 37 % — SIGNIFICANT CHANGE UP (ref 34.5–46.5)
HDLC SERPL-MCNC: 57 MG/DL — SIGNIFICANT CHANGE UP
HDLC SERPL-MCNC: 57 MG/DL — SIGNIFICANT CHANGE UP
HDLC SERPL-MCNC: 61 MG/DL — SIGNIFICANT CHANGE UP
HGB BLD CALC-MCNC: 10.8 G/DL — LOW (ref 11.7–16.1)
HGB BLD CALC-MCNC: 10.8 G/DL — LOW (ref 11.7–16.1)
HGB BLD CALC-MCNC: 12.3 G/DL — SIGNIFICANT CHANGE UP (ref 11.7–16.1)
HGB BLD CALC-MCNC: 9.7 G/DL — LOW (ref 11.7–16.1)
HGB BLD CALC-MCNC: 9.7 G/DL — LOW (ref 11.7–16.1)
HGB BLD-MCNC: 10.2 G/DL — LOW (ref 11.5–15.5)
HGB BLD-MCNC: 10.2 G/DL — LOW (ref 11.5–15.5)
HGB BLD-MCNC: 10.6 G/DL
HGB BLD-MCNC: 10.7 G/DL — LOW (ref 11.5–15.5)
HGB BLD-MCNC: 10.7 G/DL — LOW (ref 11.5–15.5)
HGB BLD-MCNC: 11.3 G/DL — LOW (ref 11.5–15.5)
HGB BLD-MCNC: 11.6 G/DL
HGB BLD-MCNC: 11.9 G/DL
HGB BLD-MCNC: 12 G/DL — SIGNIFICANT CHANGE UP (ref 11.5–15.5)
HGB BLD-MCNC: 9.1 G/DL — LOW (ref 11.5–15.5)
HGB BLD-MCNC: 9.1 G/DL — LOW (ref 11.5–15.5)
HGB BLD-MCNC: 9.5 G/DL — LOW (ref 11.5–15.5)
HGB BLD-MCNC: 9.5 G/DL — LOW (ref 11.5–15.5)
HGB BLD-MCNC: 9.7 G/DL — LOW (ref 11.5–15.5)
HGB BLD-MCNC: 9.7 G/DL — LOW (ref 11.5–15.5)
HGB BLD-MCNC: 9.9 G/DL — LOW (ref 11.5–15.5)
HGB BLD-MCNC: 9.9 G/DL — LOW (ref 11.5–15.5)
HYALINE CASTS # UR AUTO: 0 /LPF — SIGNIFICANT CHANGE UP (ref 0–2)
HYALINE CASTS: 1 /LPF
IMM GRANULOCYTES NFR BLD AUTO: 0.3 %
IMM GRANULOCYTES NFR BLD AUTO: 0.3 %
IMM GRANULOCYTES NFR BLD AUTO: 0.5 %
IMM GRANULOCYTES NFR BLD AUTO: 0.5 % — SIGNIFICANT CHANGE UP (ref 0–0.9)
IMM GRANULOCYTES NFR BLD AUTO: 0.7 % — SIGNIFICANT CHANGE UP (ref 0–0.9)
IMM GRANULOCYTES NFR BLD AUTO: 0.7 % — SIGNIFICANT CHANGE UP (ref 0–0.9)
IMM GRANULOCYTES NFR BLD AUTO: 1 % — HIGH (ref 0–0.9)
IMM GRANULOCYTES NFR BLD AUTO: 1 % — HIGH (ref 0–0.9)
INR BLD: 1.35 RATIO — HIGH (ref 0.85–1.18)
INR BLD: 1.35 RATIO — HIGH (ref 0.85–1.18)
INR BLD: 1.38 RATIO — HIGH (ref 0.85–1.18)
INR BLD: 1.38 RATIO — HIGH (ref 0.85–1.18)
IRON SATN MFR SERPL: 13 % — LOW (ref 14–50)
IRON SATN MFR SERPL: 13 % — LOW (ref 14–50)
IRON SATN MFR SERPL: 41 UG/DL — SIGNIFICANT CHANGE UP (ref 30–160)
IRON SATN MFR SERPL: 41 UG/DL — SIGNIFICANT CHANGE UP (ref 30–160)
KETONES UR-MCNC: NEGATIVE MG/DL — SIGNIFICANT CHANGE UP
KETONES UR-MCNC: NEGATIVE — SIGNIFICANT CHANGE UP
KETONES URINE: NEGATIVE
KETONES URINE: NEGATIVE MG/DL
LACTATE BLDV-MCNC: 1.5 MMOL/L — SIGNIFICANT CHANGE UP (ref 0.5–2)
LACTATE BLDV-MCNC: 1.5 MMOL/L — SIGNIFICANT CHANGE UP (ref 0.5–2)
LACTATE BLDV-MCNC: 2 MMOL/L — SIGNIFICANT CHANGE UP (ref 0.5–2)
LACTATE BLDV-MCNC: 3.1 MMOL/L — HIGH (ref 0.5–2)
LACTATE BLDV-MCNC: 3.1 MMOL/L — HIGH (ref 0.5–2)
LDH SERPL L TO P-CCNC: 273 U/L — HIGH (ref 50–242)
LDH SERPL L TO P-CCNC: 273 U/L — HIGH (ref 50–242)
LEUKOCYTE ESTERASE UR-ACNC: ABNORMAL
LEUKOCYTE ESTERASE UR-ACNC: NEGATIVE — SIGNIFICANT CHANGE UP
LEUKOCYTE ESTERASE URINE: ABNORMAL
LEUKOCYTE ESTERASE URINE: ABNORMAL
LIDOCAIN IGE QN: 36 U/L — SIGNIFICANT CHANGE UP (ref 7–60)
LIPID PNL WITH DIRECT LDL SERPL: 51 MG/DL — SIGNIFICANT CHANGE UP
LIPID PNL WITH DIRECT LDL SERPL: 51 MG/DL — SIGNIFICANT CHANGE UP
LIPID PNL WITH DIRECT LDL SERPL: 55 MG/DL — SIGNIFICANT CHANGE UP
LYMPHOCYTES # BLD AUTO: 0.85 K/UL
LYMPHOCYTES # BLD AUTO: 0.87 K/UL — LOW (ref 1–3.3)
LYMPHOCYTES # BLD AUTO: 0.87 K/UL — LOW (ref 1–3.3)
LYMPHOCYTES # BLD AUTO: 0.94 K/UL
LYMPHOCYTES # BLD AUTO: 1 K/UL — SIGNIFICANT CHANGE UP (ref 1–3.3)
LYMPHOCYTES # BLD AUTO: 1 K/UL — SIGNIFICANT CHANGE UP (ref 1–3.3)
LYMPHOCYTES # BLD AUTO: 1.09 K/UL — SIGNIFICANT CHANGE UP (ref 1–3.3)
LYMPHOCYTES # BLD AUTO: 1.23 K/UL
LYMPHOCYTES # BLD AUTO: 10.2 % — LOW (ref 13–44)
LYMPHOCYTES # BLD AUTO: 10.2 % — LOW (ref 13–44)
LYMPHOCYTES # BLD AUTO: 10.8 % — LOW (ref 13–44)
LYMPHOCYTES # BLD AUTO: 10.8 % — LOW (ref 13–44)
LYMPHOCYTES # BLD AUTO: 12.7 % — LOW (ref 13–44)
LYMPHOCYTES NFR BLD AUTO: 12.3 %
LYMPHOCYTES NFR BLD AUTO: 12.4 %
LYMPHOCYTES NFR BLD AUTO: 16.7 %
MAGNESIUM SERPL-MCNC: 2.1 MG/DL — SIGNIFICANT CHANGE UP (ref 1.6–2.6)
MAN DIFF?: NORMAL
MCHC RBC-ENTMCNC: 30.6 PG
MCHC RBC-ENTMCNC: 30.7 GM/DL
MCHC RBC-ENTMCNC: 30.8 GM/DL — LOW (ref 32–36)
MCHC RBC-ENTMCNC: 30.8 GM/DL — LOW (ref 32–36)
MCHC RBC-ENTMCNC: 31.3 PG — SIGNIFICANT CHANGE UP (ref 27–34)
MCHC RBC-ENTMCNC: 31.5 GM/DL
MCHC RBC-ENTMCNC: 31.6 GM/DL — LOW (ref 32–36)
MCHC RBC-ENTMCNC: 31.6 GM/DL — LOW (ref 32–36)
MCHC RBC-ENTMCNC: 31.7 GM/DL — LOW (ref 32–36)
MCHC RBC-ENTMCNC: 31.7 PG — SIGNIFICANT CHANGE UP (ref 27–34)
MCHC RBC-ENTMCNC: 31.7 PG — SIGNIFICANT CHANGE UP (ref 27–34)
MCHC RBC-ENTMCNC: 31.8 GM/DL — LOW (ref 32–36)
MCHC RBC-ENTMCNC: 31.8 GM/DL — LOW (ref 32–36)
MCHC RBC-ENTMCNC: 31.8 PG
MCHC RBC-ENTMCNC: 31.9 PG — SIGNIFICANT CHANGE UP (ref 27–34)
MCHC RBC-ENTMCNC: 31.9 PG — SIGNIFICANT CHANGE UP (ref 27–34)
MCHC RBC-ENTMCNC: 32.1 GM/DL — SIGNIFICANT CHANGE UP (ref 32–36)
MCHC RBC-ENTMCNC: 32.1 GM/DL — SIGNIFICANT CHANGE UP (ref 32–36)
MCHC RBC-ENTMCNC: 32.2 GM/DL — SIGNIFICANT CHANGE UP (ref 32–36)
MCHC RBC-ENTMCNC: 32.2 PG — SIGNIFICANT CHANGE UP (ref 27–34)
MCHC RBC-ENTMCNC: 32.3 GM/DL — SIGNIFICANT CHANGE UP (ref 32–36)
MCHC RBC-ENTMCNC: 32.3 GM/DL — SIGNIFICANT CHANGE UP (ref 32–36)
MCHC RBC-ENTMCNC: 32.4 PG — SIGNIFICANT CHANGE UP (ref 27–34)
MCHC RBC-ENTMCNC: 32.5 PG — SIGNIFICANT CHANGE UP (ref 27–34)
MCHC RBC-ENTMCNC: 32.5 PG — SIGNIFICANT CHANGE UP (ref 27–34)
MCHC RBC-ENTMCNC: 32.7 PG — SIGNIFICANT CHANGE UP (ref 27–34)
MCHC RBC-ENTMCNC: 32.7 PG — SIGNIFICANT CHANGE UP (ref 27–34)
MCHC RBC-ENTMCNC: 33.3 GM/DL
MCHC RBC-ENTMCNC: 33.4 PG
MCV RBC AUTO: 100 FL — SIGNIFICANT CHANGE UP (ref 80–100)
MCV RBC AUTO: 100 FL — SIGNIFICANT CHANGE UP (ref 80–100)
MCV RBC AUTO: 100.3 FL
MCV RBC AUTO: 100.3 FL — HIGH (ref 80–100)
MCV RBC AUTO: 100.3 FL — HIGH (ref 80–100)
MCV RBC AUTO: 100.6 FL — HIGH (ref 80–100)
MCV RBC AUTO: 101.2 FL
MCV RBC AUTO: 101.4 FL — HIGH (ref 80–100)
MCV RBC AUTO: 101.4 FL — HIGH (ref 80–100)
MCV RBC AUTO: 102.9 FL — HIGH (ref 80–100)
MCV RBC AUTO: 102.9 FL — HIGH (ref 80–100)
MCV RBC AUTO: 103.6 FL — HIGH (ref 80–100)
MCV RBC AUTO: 103.6 FL — HIGH (ref 80–100)
MCV RBC AUTO: 98.4 FL — SIGNIFICANT CHANGE UP (ref 80–100)
MCV RBC AUTO: 99.5 FL
MCV RBC AUTO: 99.7 FL — SIGNIFICANT CHANGE UP (ref 80–100)
MCV RBC AUTO: 99.7 FL — SIGNIFICANT CHANGE UP (ref 80–100)
METHOD TYPE: SIGNIFICANT CHANGE UP
METHOD TYPE: SIGNIFICANT CHANGE UP
MICROSCOPIC-UA: NORMAL
MICROSCOPIC-UA: NORMAL
MONOCYTES # BLD AUTO: 0.85 K/UL
MONOCYTES # BLD AUTO: 0.97 K/UL — HIGH (ref 0–0.9)
MONOCYTES # BLD AUTO: 1.03 K/UL
MONOCYTES # BLD AUTO: 1.1 K/UL
MONOCYTES # BLD AUTO: 1.38 K/UL — HIGH (ref 0–0.9)
MONOCYTES NFR BLD AUTO: 10.5 % — SIGNIFICANT CHANGE UP (ref 2–14)
MONOCYTES NFR BLD AUTO: 10.5 % — SIGNIFICANT CHANGE UP (ref 2–14)
MONOCYTES NFR BLD AUTO: 11.4 % — SIGNIFICANT CHANGE UP (ref 2–14)
MONOCYTES NFR BLD AUTO: 11.4 % — SIGNIFICANT CHANGE UP (ref 2–14)
MONOCYTES NFR BLD AUTO: 12.3 %
MONOCYTES NFR BLD AUTO: 14 %
MONOCYTES NFR BLD AUTO: 14.5 %
MONOCYTES NFR BLD AUTO: 16 % — HIGH (ref 2–14)
NEUTROPHILS # BLD AUTO: 4.99 K/UL
NEUTROPHILS # BLD AUTO: 5.1 K/UL
NEUTROPHILS # BLD AUTO: 5.39 K/UL
NEUTROPHILS # BLD AUTO: 5.94 K/UL — SIGNIFICANT CHANGE UP (ref 1.8–7.4)
NEUTROPHILS # BLD AUTO: 6.39 K/UL — SIGNIFICANT CHANGE UP (ref 1.8–7.4)
NEUTROPHILS # BLD AUTO: 6.39 K/UL — SIGNIFICANT CHANGE UP (ref 1.8–7.4)
NEUTROPHILS # BLD AUTO: 7.1 K/UL — SIGNIFICANT CHANGE UP (ref 1.8–7.4)
NEUTROPHILS # BLD AUTO: 7.1 K/UL — SIGNIFICANT CHANGE UP (ref 1.8–7.4)
NEUTROPHILS NFR BLD AUTO: 67.9 %
NEUTROPHILS NFR BLD AUTO: 69 % — SIGNIFICANT CHANGE UP (ref 43–77)
NEUTROPHILS NFR BLD AUTO: 70.9 %
NEUTROPHILS NFR BLD AUTO: 73.8 %
NEUTROPHILS NFR BLD AUTO: 75.4 % — SIGNIFICANT CHANGE UP (ref 43–77)
NEUTROPHILS NFR BLD AUTO: 75.4 % — SIGNIFICANT CHANGE UP (ref 43–77)
NEUTROPHILS NFR BLD AUTO: 76.4 % — SIGNIFICANT CHANGE UP (ref 43–77)
NEUTROPHILS NFR BLD AUTO: 76.4 % — SIGNIFICANT CHANGE UP (ref 43–77)
NITRITE UR-MCNC: NEGATIVE — SIGNIFICANT CHANGE UP
NITRITE URINE: NEGATIVE
NITRITE URINE: NEGATIVE
NON HDL CHOLESTEROL: 67 MG/DL — SIGNIFICANT CHANGE UP
NON HDL CHOLESTEROL: 67 MG/DL — SIGNIFICANT CHANGE UP
NON HDL CHOLESTEROL: 68 MG/DL — SIGNIFICANT CHANGE UP
NRBC # BLD: 0 /100 WBCS — SIGNIFICANT CHANGE UP (ref 0–0)
NT-PROBNP SERPL-SCNC: 303 PG/ML — HIGH (ref 0–300)
NT-PROBNP SERPL-SCNC: 330 PG/ML — HIGH (ref 0–300)
NT-PROBNP SERPL-SCNC: 643 PG/ML — HIGH (ref 0–300)
NT-PROBNP SERPL-SCNC: 643 PG/ML — HIGH (ref 0–300)
ORGANISM # SPEC MICROSCOPIC CNT: ABNORMAL
OSMOLALITY UR: 387 MOS/KG — SIGNIFICANT CHANGE UP (ref 300–900)
OSMOLALITY UR: 387 MOS/KG — SIGNIFICANT CHANGE UP (ref 300–900)
PCO2 BLDV: 42 MMHG — SIGNIFICANT CHANGE UP (ref 39–42)
PCO2 BLDV: 42 MMHG — SIGNIFICANT CHANGE UP (ref 39–42)
PCO2 BLDV: 45 MMHG — HIGH (ref 39–42)
PCO2 BLDV: 45 MMHG — HIGH (ref 39–42)
PCO2 BLDV: 49 MMHG — HIGH (ref 39–42)
PH BLDV: 7.39 — SIGNIFICANT CHANGE UP (ref 7.32–7.43)
PH BLDV: 7.39 — SIGNIFICANT CHANGE UP (ref 7.32–7.43)
PH BLDV: 7.4 — SIGNIFICANT CHANGE UP (ref 7.32–7.43)
PH BLDV: 7.4 — SIGNIFICANT CHANGE UP (ref 7.32–7.43)
PH BLDV: 7.41 — SIGNIFICANT CHANGE UP (ref 7.32–7.43)
PH UR: 6 — SIGNIFICANT CHANGE UP (ref 5–8)
PH UR: 6 — SIGNIFICANT CHANGE UP (ref 5–8)
PH UR: 7 — SIGNIFICANT CHANGE UP (ref 5–8)
PH UR: 7 — SIGNIFICANT CHANGE UP (ref 5–8)
PH UR: 7.5 — SIGNIFICANT CHANGE UP (ref 5–8)
PH URINE: 6
PH URINE: 6.5
PHOSPHATE SERPL-MCNC: 3 MG/DL — SIGNIFICANT CHANGE UP (ref 2.5–4.5)
PHOSPHATE SERPL-MCNC: 3 MG/DL — SIGNIFICANT CHANGE UP (ref 2.5–4.5)
PHOSPHATE SERPL-MCNC: 3.2 MG/DL — SIGNIFICANT CHANGE UP (ref 2.5–4.5)
PLATELET # BLD AUTO: 166 K/UL — SIGNIFICANT CHANGE UP (ref 150–400)
PLATELET # BLD AUTO: 166 K/UL — SIGNIFICANT CHANGE UP (ref 150–400)
PLATELET # BLD AUTO: 176 K/UL — SIGNIFICANT CHANGE UP (ref 150–400)
PLATELET # BLD AUTO: 176 K/UL — SIGNIFICANT CHANGE UP (ref 150–400)
PLATELET # BLD AUTO: 182 K/UL — SIGNIFICANT CHANGE UP (ref 150–400)
PLATELET # BLD AUTO: 183 K/UL — SIGNIFICANT CHANGE UP (ref 150–400)
PLATELET # BLD AUTO: 183 K/UL — SIGNIFICANT CHANGE UP (ref 150–400)
PLATELET # BLD AUTO: 191 K/UL — SIGNIFICANT CHANGE UP (ref 150–400)
PLATELET # BLD AUTO: 194 K/UL — SIGNIFICANT CHANGE UP (ref 150–400)
PLATELET # BLD AUTO: 204 K/UL
PLATELET # BLD AUTO: 204 K/UL
PLATELET # BLD AUTO: 216 K/UL — SIGNIFICANT CHANGE UP (ref 150–400)
PLATELET # BLD AUTO: 216 K/UL — SIGNIFICANT CHANGE UP (ref 150–400)
PLATELET # BLD AUTO: 261 K/UL
PO2 BLDV: 36 MMHG — SIGNIFICANT CHANGE UP (ref 25–45)
PO2 BLDV: 46 MMHG — HIGH (ref 25–45)
POTASSIUM BLDV-SCNC: 3.8 MMOL/L — SIGNIFICANT CHANGE UP (ref 3.5–5.1)
POTASSIUM BLDV-SCNC: 3.8 MMOL/L — SIGNIFICANT CHANGE UP (ref 3.5–5.1)
POTASSIUM BLDV-SCNC: 4.3 MMOL/L — SIGNIFICANT CHANGE UP (ref 3.5–5.1)
POTASSIUM BLDV-SCNC: 4.3 MMOL/L — SIGNIFICANT CHANGE UP (ref 3.5–5.1)
POTASSIUM BLDV-SCNC: 5.9 MMOL/L — HIGH (ref 3.5–5.1)
POTASSIUM SERPL-MCNC: 3.4 MMOL/L — LOW (ref 3.5–5.3)
POTASSIUM SERPL-MCNC: 3.4 MMOL/L — LOW (ref 3.5–5.3)
POTASSIUM SERPL-MCNC: 3.9 MMOL/L — SIGNIFICANT CHANGE UP (ref 3.5–5.3)
POTASSIUM SERPL-MCNC: 3.9 MMOL/L — SIGNIFICANT CHANGE UP (ref 3.5–5.3)
POTASSIUM SERPL-MCNC: 4 MMOL/L — SIGNIFICANT CHANGE UP (ref 3.5–5.3)
POTASSIUM SERPL-MCNC: 4 MMOL/L — SIGNIFICANT CHANGE UP (ref 3.5–5.3)
POTASSIUM SERPL-MCNC: 4.2 MMOL/L — SIGNIFICANT CHANGE UP (ref 3.5–5.3)
POTASSIUM SERPL-MCNC: 4.3 MMOL/L — SIGNIFICANT CHANGE UP (ref 3.5–5.3)
POTASSIUM SERPL-MCNC: 4.3 MMOL/L — SIGNIFICANT CHANGE UP (ref 3.5–5.3)
POTASSIUM SERPL-MCNC: 4.4 MMOL/L — SIGNIFICANT CHANGE UP (ref 3.5–5.3)
POTASSIUM SERPL-MCNC: 4.4 MMOL/L — SIGNIFICANT CHANGE UP (ref 3.5–5.3)
POTASSIUM SERPL-MCNC: 4.5 MMOL/L — SIGNIFICANT CHANGE UP (ref 3.5–5.3)
POTASSIUM SERPL-MCNC: 4.7 MMOL/L — SIGNIFICANT CHANGE UP (ref 3.5–5.3)
POTASSIUM SERPL-MCNC: 5.1 MMOL/L — SIGNIFICANT CHANGE UP (ref 3.5–5.3)
POTASSIUM SERPL-SCNC: 3.4 MMOL/L — LOW (ref 3.5–5.3)
POTASSIUM SERPL-SCNC: 3.4 MMOL/L — LOW (ref 3.5–5.3)
POTASSIUM SERPL-SCNC: 3.7 MMOL/L
POTASSIUM SERPL-SCNC: 3.8 MMOL/L
POTASSIUM SERPL-SCNC: 3.9 MMOL/L — SIGNIFICANT CHANGE UP (ref 3.5–5.3)
POTASSIUM SERPL-SCNC: 3.9 MMOL/L — SIGNIFICANT CHANGE UP (ref 3.5–5.3)
POTASSIUM SERPL-SCNC: 4 MMOL/L — SIGNIFICANT CHANGE UP (ref 3.5–5.3)
POTASSIUM SERPL-SCNC: 4 MMOL/L — SIGNIFICANT CHANGE UP (ref 3.5–5.3)
POTASSIUM SERPL-SCNC: 4.2 MMOL/L
POTASSIUM SERPL-SCNC: 4.2 MMOL/L — SIGNIFICANT CHANGE UP (ref 3.5–5.3)
POTASSIUM SERPL-SCNC: 4.3 MMOL/L
POTASSIUM SERPL-SCNC: 4.3 MMOL/L — SIGNIFICANT CHANGE UP (ref 3.5–5.3)
POTASSIUM SERPL-SCNC: 4.3 MMOL/L — SIGNIFICANT CHANGE UP (ref 3.5–5.3)
POTASSIUM SERPL-SCNC: 4.4 MMOL/L — SIGNIFICANT CHANGE UP (ref 3.5–5.3)
POTASSIUM SERPL-SCNC: 4.4 MMOL/L — SIGNIFICANT CHANGE UP (ref 3.5–5.3)
POTASSIUM SERPL-SCNC: 4.5 MMOL/L — SIGNIFICANT CHANGE UP (ref 3.5–5.3)
POTASSIUM SERPL-SCNC: 4.7 MMOL/L — SIGNIFICANT CHANGE UP (ref 3.5–5.3)
POTASSIUM SERPL-SCNC: 5.1 MMOL/L — SIGNIFICANT CHANGE UP (ref 3.5–5.3)
POTASSIUM UR-SCNC: 11 MMOL/L — SIGNIFICANT CHANGE UP
POTASSIUM UR-SCNC: 11 MMOL/L — SIGNIFICANT CHANGE UP
PROCALCITONIN SERPL-MCNC: 0.04 NG/ML — SIGNIFICANT CHANGE UP (ref 0.02–0.1)
PROT ?TM UR-MCNC: 9 MG/DL — SIGNIFICANT CHANGE UP (ref 0–12)
PROT ?TM UR-MCNC: 9 MG/DL — SIGNIFICANT CHANGE UP (ref 0–12)
PROT SERPL-MCNC: 7 G/DL — SIGNIFICANT CHANGE UP (ref 6–8.3)
PROT SERPL-MCNC: 7.1 G/DL
PROT SERPL-MCNC: 7.1 G/DL — SIGNIFICANT CHANGE UP (ref 6–8.3)
PROT SERPL-MCNC: 7.2 G/DL
PROT SERPL-MCNC: 7.2 G/DL
PROT SERPL-MCNC: 7.6 G/DL
PROT SERPL-MCNC: 7.7 G/DL — SIGNIFICANT CHANGE UP (ref 6–8.3)
PROT SERPL-MCNC: 7.8 G/DL — SIGNIFICANT CHANGE UP (ref 6–8.3)
PROT SERPL-MCNC: 7.8 G/DL — SIGNIFICANT CHANGE UP (ref 6–8.3)
PROT UR-MCNC: NEGATIVE MG/DL — SIGNIFICANT CHANGE UP
PROT UR-MCNC: NEGATIVE MG/DL — SIGNIFICANT CHANGE UP
PROT UR-MCNC: NEGATIVE — SIGNIFICANT CHANGE UP
PROT UR-MCNC: SIGNIFICANT CHANGE UP MG/DL
PROT UR-MCNC: SIGNIFICANT CHANGE UP MG/DL
PROT/CREAT UR-RTO: 0.3 RATIO — HIGH (ref 0–0.2)
PROT/CREAT UR-RTO: 0.3 RATIO — HIGH (ref 0–0.2)
PROTEIN URINE: NEGATIVE MG/DL
PROTEIN URINE: NORMAL
PROTHROM AB SERPL-ACNC: 14.1 SEC — HIGH (ref 9.5–13)
PROTHROM AB SERPL-ACNC: 14.1 SEC — HIGH (ref 9.5–13)
PROTHROM AB SERPL-ACNC: 15 SEC — HIGH (ref 9.5–13)
PROTHROM AB SERPL-ACNC: 15 SEC — HIGH (ref 9.5–13)
RAPID RVP RESULT: SIGNIFICANT CHANGE UP
RBC # BLD: 2.78 M/UL — LOW (ref 3.8–5.2)
RBC # BLD: 2.78 M/UL — LOW (ref 3.8–5.2)
RBC # BLD: 2.92 M/UL — LOW (ref 3.8–5.2)
RBC # BLD: 2.92 M/UL — LOW (ref 3.8–5.2)
RBC # BLD: 3.01 M/UL — LOW (ref 3.8–5.2)
RBC # BLD: 3.01 M/UL — LOW (ref 3.8–5.2)
RBC # BLD: 3.12 M/UL — LOW (ref 3.8–5.2)
RBC # BLD: 3.12 M/UL — LOW (ref 3.8–5.2)
RBC # BLD: 3.17 M/UL — LOW (ref 3.8–5.2)
RBC # BLD: 3.33 M/UL
RBC # BLD: 3.35 M/UL — LOW (ref 3.8–5.2)
RBC # BLD: 3.35 M/UL — LOW (ref 3.8–5.2)
RBC # BLD: 3.47 M/UL
RBC # BLD: 3.49 M/UL — LOW (ref 3.8–5.2)
RBC # BLD: 3.84 M/UL — SIGNIFICANT CHANGE UP (ref 3.8–5.2)
RBC # BLD: 3.89 M/UL
RBC # FLD: 13.3 % — SIGNIFICANT CHANGE UP (ref 10.3–14.5)
RBC # FLD: 13.4 % — SIGNIFICANT CHANGE UP (ref 10.3–14.5)
RBC # FLD: 13.8 %
RBC # FLD: 14.1 % — SIGNIFICANT CHANGE UP (ref 10.3–14.5)
RBC # FLD: 14.1 % — SIGNIFICANT CHANGE UP (ref 10.3–14.5)
RBC # FLD: 14.3 %
RBC # FLD: 14.4 % — SIGNIFICANT CHANGE UP (ref 10.3–14.5)
RBC # FLD: 14.5 %
RBC # FLD: 14.5 % — SIGNIFICANT CHANGE UP (ref 10.3–14.5)
RBC # FLD: 14.5 % — SIGNIFICANT CHANGE UP (ref 10.3–14.5)
RBC # FLD: 14.6 % — HIGH (ref 10.3–14.5)
RBC # FLD: 14.6 % — HIGH (ref 10.3–14.5)
RBC # FLD: 14.7 % — HIGH (ref 10.3–14.5)
RBC # FLD: 14.7 % — HIGH (ref 10.3–14.5)
RBC CASTS # UR COMP ASSIST: 1 /HPF — SIGNIFICANT CHANGE UP (ref 0–4)
RBC CASTS # UR COMP ASSIST: 47 /HPF — HIGH (ref 0–4)
RBC CASTS # UR COMP ASSIST: 47 /HPF — HIGH (ref 0–4)
RBC CASTS # UR COMP ASSIST: 50 /HPF — HIGH (ref 0–4)
RBC CASTS # UR COMP ASSIST: 50 /HPF — HIGH (ref 0–4)
RED BLOOD CELLS URINE: 1 /HPF
RED BLOOD CELLS URINE: 6 /HPF
RETICS #: 125.5 K/UL — HIGH (ref 25–125)
RETICS #: 125.5 K/UL — HIGH (ref 25–125)
RETICS/RBC NFR: 4 % — HIGH (ref 0.5–2.5)
RETICS/RBC NFR: 4 % — HIGH (ref 0.5–2.5)
REVIEW: SIGNIFICANT CHANGE UP
RSV RNA NPH QL NAA+NON-PROBE: SIGNIFICANT CHANGE UP
RSV RNA NPH QL NAA+NON-PROBE: SIGNIFICANT CHANGE UP
SAO2 % BLDV: 54.7 % — LOW (ref 67–88)
SAO2 % BLDV: 68.3 % — SIGNIFICANT CHANGE UP (ref 67–88)
SAO2 % BLDV: 68.3 % — SIGNIFICANT CHANGE UP (ref 67–88)
SAO2 % BLDV: 74.6 % — SIGNIFICANT CHANGE UP (ref 67–88)
SAO2 % BLDV: 74.6 % — SIGNIFICANT CHANGE UP (ref 67–88)
SARS-COV-2 RNA SPEC QL NAA+PROBE: SIGNIFICANT CHANGE UP
SODIUM SERPL-SCNC: 133 MMOL/L
SODIUM SERPL-SCNC: 133 MMOL/L — LOW (ref 135–145)
SODIUM SERPL-SCNC: 134 MMOL/L — LOW (ref 135–145)
SODIUM SERPL-SCNC: 136 MMOL/L — SIGNIFICANT CHANGE UP (ref 135–145)
SODIUM SERPL-SCNC: 138 MMOL/L — SIGNIFICANT CHANGE UP (ref 135–145)
SODIUM SERPL-SCNC: 138 MMOL/L — SIGNIFICANT CHANGE UP (ref 135–145)
SODIUM SERPL-SCNC: 139 MMOL/L
SODIUM SERPL-SCNC: 139 MMOL/L
SODIUM SERPL-SCNC: 140 MMOL/L — SIGNIFICANT CHANGE UP (ref 135–145)
SODIUM SERPL-SCNC: 140 MMOL/L — SIGNIFICANT CHANGE UP (ref 135–145)
SODIUM SERPL-SCNC: 141 MMOL/L
SODIUM SERPL-SCNC: 141 MMOL/L — SIGNIFICANT CHANGE UP (ref 135–145)
SODIUM SERPL-SCNC: 141 MMOL/L — SIGNIFICANT CHANGE UP (ref 135–145)
SODIUM UR-SCNC: 122 MMOL/L — SIGNIFICANT CHANGE UP
SODIUM UR-SCNC: 122 MMOL/L — SIGNIFICANT CHANGE UP
SP GR SPEC: 1.01 — LOW (ref 1.01–1.02)
SP GR SPEC: 1.02 — SIGNIFICANT CHANGE UP (ref 1–1.03)
SPECIFIC GRAVITY URINE: 1.02
SPECIFIC GRAVITY URINE: 1.02
SPECIMEN SOURCE: SIGNIFICANT CHANGE UP
SQUAMOUS # UR AUTO: 1 /HPF — SIGNIFICANT CHANGE UP (ref 0–5)
SQUAMOUS # UR AUTO: 1 /HPF — SIGNIFICANT CHANGE UP (ref 0–5)
SQUAMOUS # UR AUTO: 2 /HPF — SIGNIFICANT CHANGE UP (ref 0–5)
SQUAMOUS # UR AUTO: 2 /HPF — SIGNIFICANT CHANGE UP (ref 0–5)
SQUAMOUS EPITHELIAL CELLS: 2 /HPF
TIBC SERPL-MCNC: 305 UG/DL — SIGNIFICANT CHANGE UP (ref 220–430)
TIBC SERPL-MCNC: 305 UG/DL — SIGNIFICANT CHANGE UP (ref 220–430)
TRIGL SERPL-MCNC: 62 MG/DL — SIGNIFICANT CHANGE UP
TRIGL SERPL-MCNC: 79 MG/DL — SIGNIFICANT CHANGE UP
TRIGL SERPL-MCNC: 79 MG/DL — SIGNIFICANT CHANGE UP
TROPONIN T, HIGH SENSITIVITY RESULT: 12 NG/L — SIGNIFICANT CHANGE UP (ref 0–51)
TROPONIN T, HIGH SENSITIVITY RESULT: 12 NG/L — SIGNIFICANT CHANGE UP (ref 0–51)
TROPONIN T, HIGH SENSITIVITY RESULT: 18 NG/L — SIGNIFICANT CHANGE UP (ref 0–51)
TROPONIN T, HIGH SENSITIVITY RESULT: 18 NG/L — SIGNIFICANT CHANGE UP (ref 0–51)
TSH SERPL-MCNC: 1.46 UIU/ML — SIGNIFICANT CHANGE UP (ref 0.27–4.2)
TSH SERPL-MCNC: 1.46 UIU/ML — SIGNIFICANT CHANGE UP (ref 0.27–4.2)
UIBC SERPL-MCNC: 265 UG/DL — SIGNIFICANT CHANGE UP (ref 110–370)
UIBC SERPL-MCNC: 265 UG/DL — SIGNIFICANT CHANGE UP (ref 110–370)
UROBILINOGEN FLD QL: 1 MG/DL — SIGNIFICANT CHANGE UP (ref 0.2–1)
UROBILINOGEN FLD QL: NEGATIVE — SIGNIFICANT CHANGE UP
UROBILINOGEN URINE: 1 MG/DL
UROBILINOGEN URINE: NORMAL
UUN UR-MCNC: 303 MG/DL — SIGNIFICANT CHANGE UP
UUN UR-MCNC: 303 MG/DL — SIGNIFICANT CHANGE UP
VIT B12 SERPL-MCNC: 966 PG/ML — SIGNIFICANT CHANGE UP (ref 232–1245)
VIT B12 SERPL-MCNC: 966 PG/ML — SIGNIFICANT CHANGE UP (ref 232–1245)
WBC # BLD: 7.87 K/UL — SIGNIFICANT CHANGE UP (ref 3.8–10.5)
WBC # BLD: 8.49 K/UL — SIGNIFICANT CHANGE UP (ref 3.8–10.5)
WBC # BLD: 8.49 K/UL — SIGNIFICANT CHANGE UP (ref 3.8–10.5)
WBC # BLD: 8.61 K/UL — SIGNIFICANT CHANGE UP (ref 3.8–10.5)
WBC # BLD: 8.7 K/UL — SIGNIFICANT CHANGE UP (ref 3.8–10.5)
WBC # BLD: 8.7 K/UL — SIGNIFICANT CHANGE UP (ref 3.8–10.5)
WBC # BLD: 8.73 K/UL — SIGNIFICANT CHANGE UP (ref 3.8–10.5)
WBC # BLD: 8.73 K/UL — SIGNIFICANT CHANGE UP (ref 3.8–10.5)
WBC # BLD: 8.91 K/UL — SIGNIFICANT CHANGE UP (ref 3.8–10.5)
WBC # BLD: 8.91 K/UL — SIGNIFICANT CHANGE UP (ref 3.8–10.5)
WBC # BLD: 9.28 K/UL — SIGNIFICANT CHANGE UP (ref 3.8–10.5)
WBC # BLD: 9.28 K/UL — SIGNIFICANT CHANGE UP (ref 3.8–10.5)
WBC # BLD: 9.52 K/UL — SIGNIFICANT CHANGE UP (ref 3.8–10.5)
WBC # BLD: 9.52 K/UL — SIGNIFICANT CHANGE UP (ref 3.8–10.5)
WBC # FLD AUTO: 6.91 K/UL
WBC # FLD AUTO: 7.35 K/UL
WBC # FLD AUTO: 7.6 K/UL
WBC # FLD AUTO: 7.87 K/UL — SIGNIFICANT CHANGE UP (ref 3.8–10.5)
WBC # FLD AUTO: 8.49 K/UL — SIGNIFICANT CHANGE UP (ref 3.8–10.5)
WBC # FLD AUTO: 8.49 K/UL — SIGNIFICANT CHANGE UP (ref 3.8–10.5)
WBC # FLD AUTO: 8.61 K/UL — SIGNIFICANT CHANGE UP (ref 3.8–10.5)
WBC # FLD AUTO: 8.7 K/UL — SIGNIFICANT CHANGE UP (ref 3.8–10.5)
WBC # FLD AUTO: 8.7 K/UL — SIGNIFICANT CHANGE UP (ref 3.8–10.5)
WBC # FLD AUTO: 8.73 K/UL — SIGNIFICANT CHANGE UP (ref 3.8–10.5)
WBC # FLD AUTO: 8.73 K/UL — SIGNIFICANT CHANGE UP (ref 3.8–10.5)
WBC # FLD AUTO: 8.91 K/UL — SIGNIFICANT CHANGE UP (ref 3.8–10.5)
WBC # FLD AUTO: 8.91 K/UL — SIGNIFICANT CHANGE UP (ref 3.8–10.5)
WBC # FLD AUTO: 9.28 K/UL — SIGNIFICANT CHANGE UP (ref 3.8–10.5)
WBC # FLD AUTO: 9.28 K/UL — SIGNIFICANT CHANGE UP (ref 3.8–10.5)
WBC # FLD AUTO: 9.52 K/UL — SIGNIFICANT CHANGE UP (ref 3.8–10.5)
WBC # FLD AUTO: 9.52 K/UL — SIGNIFICANT CHANGE UP (ref 3.8–10.5)
WBC UR QL: 0 /HPF — SIGNIFICANT CHANGE UP (ref 0–5)
WBC UR QL: 2 /HPF — SIGNIFICANT CHANGE UP (ref 0–5)
WBC UR QL: 2 /HPF — SIGNIFICANT CHANGE UP (ref 0–5)
WBC UR QL: 5 /HPF — SIGNIFICANT CHANGE UP (ref 0–5)
WBC UR QL: 5 /HPF — SIGNIFICANT CHANGE UP (ref 0–5)
WHITE BLOOD CELLS URINE: 0 /HPF
WHITE BLOOD CELLS URINE: 3 /HPF

## 2023-01-01 PROCEDURE — 84132 ASSAY OF SERUM POTASSIUM: CPT

## 2023-01-01 PROCEDURE — 27197 CLSD TX PELVIC RING FX: CPT

## 2023-01-01 PROCEDURE — 99285 EMERGENCY DEPT VISIT HI MDM: CPT

## 2023-01-01 PROCEDURE — 84156 ASSAY OF PROTEIN URINE: CPT

## 2023-01-01 PROCEDURE — 86901 BLOOD TYPING SEROLOGIC RH(D): CPT

## 2023-01-01 PROCEDURE — 82435 ASSAY OF BLOOD CHLORIDE: CPT

## 2023-01-01 PROCEDURE — 85027 COMPLETE CBC AUTOMATED: CPT

## 2023-01-01 PROCEDURE — 71260 CT THORAX DX C+: CPT | Mod: 26,MA

## 2023-01-01 PROCEDURE — 80048 BASIC METABOLIC PNL TOTAL CA: CPT

## 2023-01-01 PROCEDURE — 86850 RBC ANTIBODY SCREEN: CPT

## 2023-01-01 PROCEDURE — 83690 ASSAY OF LIPASE: CPT

## 2023-01-01 PROCEDURE — 82947 ASSAY GLUCOSE BLOOD QUANT: CPT

## 2023-01-01 PROCEDURE — 76377 3D RENDER W/INTRP POSTPROCES: CPT | Mod: 26

## 2023-01-01 PROCEDURE — 85014 HEMATOCRIT: CPT

## 2023-01-01 PROCEDURE — 87186 SC STD MICRODIL/AGAR DIL: CPT

## 2023-01-01 PROCEDURE — 83615 LACTATE (LD) (LDH) ENZYME: CPT

## 2023-01-01 PROCEDURE — 84295 ASSAY OF SERUM SODIUM: CPT

## 2023-01-01 PROCEDURE — 84100 ASSAY OF PHOSPHORUS: CPT

## 2023-01-01 PROCEDURE — 97161 PT EVAL LOW COMPLEX 20 MIN: CPT

## 2023-01-01 PROCEDURE — 82306 VITAMIN D 25 HYDROXY: CPT

## 2023-01-01 PROCEDURE — 84300 ASSAY OF URINE SODIUM: CPT

## 2023-01-01 PROCEDURE — 99214 OFFICE O/P EST MOD 30 MIN: CPT | Mod: 25

## 2023-01-01 PROCEDURE — 71045 X-RAY EXAM CHEST 1 VIEW: CPT

## 2023-01-01 PROCEDURE — 74176 CT ABD & PELVIS W/O CONTRAST: CPT | Mod: MH

## 2023-01-01 PROCEDURE — 80061 LIPID PANEL: CPT

## 2023-01-01 PROCEDURE — 76377 3D RENDER W/INTRP POSTPROCES: CPT

## 2023-01-01 PROCEDURE — 83880 ASSAY OF NATRIURETIC PEPTIDE: CPT

## 2023-01-01 PROCEDURE — 82570 ASSAY OF URINE CREATININE: CPT

## 2023-01-01 PROCEDURE — 85610 PROTHROMBIN TIME: CPT

## 2023-01-01 PROCEDURE — 87086 URINE CULTURE/COLONY COUNT: CPT

## 2023-01-01 PROCEDURE — 93000 ELECTROCARDIOGRAM COMPLETE: CPT

## 2023-01-01 PROCEDURE — 83605 ASSAY OF LACTIC ACID: CPT

## 2023-01-01 PROCEDURE — 84484 ASSAY OF TROPONIN QUANT: CPT

## 2023-01-01 PROCEDURE — 97165 OT EVAL LOW COMPLEX 30 MIN: CPT

## 2023-01-01 PROCEDURE — 83010 ASSAY OF HAPTOGLOBIN QUANT: CPT

## 2023-01-01 PROCEDURE — 70450 CT HEAD/BRAIN W/O DYE: CPT | Mod: MH

## 2023-01-01 PROCEDURE — 99072 ADDL SUPL MATRL&STAF TM PHE: CPT

## 2023-01-01 PROCEDURE — 72125 CT NECK SPINE W/O DYE: CPT | Mod: 26,MH

## 2023-01-01 PROCEDURE — 82330 ASSAY OF CALCIUM: CPT

## 2023-01-01 PROCEDURE — 72190 X-RAY EXAM OF PELVIS: CPT | Mod: 26

## 2023-01-01 PROCEDURE — 80053 COMPREHEN METABOLIC PANEL: CPT

## 2023-01-01 PROCEDURE — 97116 GAIT TRAINING THERAPY: CPT

## 2023-01-01 PROCEDURE — 84540 ASSAY OF URINE/UREA-N: CPT

## 2023-01-01 PROCEDURE — 83735 ASSAY OF MAGNESIUM: CPT

## 2023-01-01 PROCEDURE — 36415 COLL VENOUS BLD VENIPUNCTURE: CPT

## 2023-01-01 PROCEDURE — 84133 ASSAY OF URINE POTASSIUM: CPT

## 2023-01-01 PROCEDURE — 94640 AIRWAY INHALATION TREATMENT: CPT

## 2023-01-01 PROCEDURE — 73502 X-RAY EXAM HIP UNI 2-3 VIEWS: CPT | Mod: 26,RT

## 2023-01-01 PROCEDURE — 82728 ASSAY OF FERRITIN: CPT

## 2023-01-01 PROCEDURE — 85025 COMPLETE CBC W/AUTO DIFF WBC: CPT

## 2023-01-01 PROCEDURE — 0225U NFCT DS DNA&RNA 21 SARSCOV2: CPT

## 2023-01-01 PROCEDURE — 99223 1ST HOSP IP/OBS HIGH 75: CPT

## 2023-01-01 PROCEDURE — C8929: CPT

## 2023-01-01 PROCEDURE — 84145 PROCALCITONIN (PCT): CPT

## 2023-01-01 PROCEDURE — 83550 IRON BINDING TEST: CPT

## 2023-01-01 PROCEDURE — 93306 TTE W/DOPPLER COMPLETE: CPT | Mod: 26

## 2023-01-01 PROCEDURE — 71045 X-RAY EXAM CHEST 1 VIEW: CPT | Mod: 26

## 2023-01-01 PROCEDURE — 93000 ELECTROCARDIOGRAM COMPLETE: CPT | Mod: 59

## 2023-01-01 PROCEDURE — 83935 ASSAY OF URINE OSMOLALITY: CPT

## 2023-01-01 PROCEDURE — 82607 VITAMIN B-12: CPT

## 2023-01-01 PROCEDURE — 73502 X-RAY EXAM HIP UNI 2-3 VIEWS: CPT

## 2023-01-01 PROCEDURE — 70450 CT HEAD/BRAIN W/O DYE: CPT | Mod: 26,MH

## 2023-01-01 PROCEDURE — 72192 CT PELVIS W/O DYE: CPT | Mod: 26,59,MH

## 2023-01-01 PROCEDURE — 87637 SARSCOV2&INF A&B&RSV AMP PRB: CPT

## 2023-01-01 PROCEDURE — 86900 BLOOD TYPING SEROLOGIC ABO: CPT

## 2023-01-01 PROCEDURE — 82803 BLOOD GASES ANY COMBINATION: CPT

## 2023-01-01 PROCEDURE — 82746 ASSAY OF FOLIC ACID SERUM: CPT

## 2023-01-01 PROCEDURE — 97162 PT EVAL MOD COMPLEX 30 MIN: CPT

## 2023-01-01 PROCEDURE — 74174 CTA ABD&PLVS W/CONTRAST: CPT | Mod: 26,MA

## 2023-01-01 PROCEDURE — 72192 CT PELVIS W/O DYE: CPT | Mod: MH

## 2023-01-01 PROCEDURE — 71260 CT THORAX DX C+: CPT | Mod: MA

## 2023-01-01 PROCEDURE — 85045 AUTOMATED RETICULOCYTE COUNT: CPT

## 2023-01-01 PROCEDURE — 93005 ELECTROCARDIOGRAM TRACING: CPT

## 2023-01-01 PROCEDURE — 80151 DRUG ASSAY AMIODARONE: CPT

## 2023-01-01 PROCEDURE — 72190 X-RAY EXAM OF PELVIS: CPT

## 2023-01-01 PROCEDURE — 99496 TRANSJ CARE MGMT HIGH F2F 7D: CPT | Mod: 25

## 2023-01-01 PROCEDURE — 72125 CT NECK SPINE W/O DYE: CPT | Mod: MH

## 2023-01-01 PROCEDURE — 83036 HEMOGLOBIN GLYCOSYLATED A1C: CPT

## 2023-01-01 PROCEDURE — 82550 ASSAY OF CK (CPK): CPT

## 2023-01-01 PROCEDURE — 87635 SARS-COV-2 COVID-19 AMP PRB: CPT

## 2023-01-01 PROCEDURE — 85018 HEMOGLOBIN: CPT

## 2023-01-01 PROCEDURE — 74176 CT ABD & PELVIS W/O CONTRAST: CPT | Mod: 26,MH

## 2023-01-01 PROCEDURE — 99222 1ST HOSP IP/OBS MODERATE 55: CPT

## 2023-01-01 PROCEDURE — 97530 THERAPEUTIC ACTIVITIES: CPT

## 2023-01-01 PROCEDURE — 72170 X-RAY EXAM OF PELVIS: CPT

## 2023-01-01 PROCEDURE — 97535 SELF CARE MNGMENT TRAINING: CPT

## 2023-01-01 PROCEDURE — 84443 ASSAY THYROID STIM HORMONE: CPT

## 2023-01-01 PROCEDURE — 85730 THROMBOPLASTIN TIME PARTIAL: CPT

## 2023-01-01 PROCEDURE — 81001 URINALYSIS AUTO W/SCOPE: CPT

## 2023-01-01 PROCEDURE — 83540 ASSAY OF IRON: CPT

## 2023-01-01 PROCEDURE — 74174 CTA ABD&PLVS W/CONTRAST: CPT | Mod: MA

## 2023-01-01 PROCEDURE — 97110 THERAPEUTIC EXERCISES: CPT

## 2023-01-01 RX ORDER — POLYETHYLENE GLYCOL 3350 17 G/17G
17 POWDER, FOR SOLUTION ORAL
Qty: 0 | Refills: 0 | DISCHARGE
Start: 2023-01-01

## 2023-01-01 RX ORDER — DOCUSATE SODIUM 100 MG/1
100 CAPSULE ORAL DAILY
Qty: 270 | Refills: 3 | Status: ACTIVE | COMMUNITY
Start: 1900-01-01 | End: 1900-01-01

## 2023-01-01 RX ORDER — LANOLIN ALCOHOL/MO/W.PET/CERES
3 CREAM (GRAM) TOPICAL AT BEDTIME
Refills: 0 | Status: DISCONTINUED | OUTPATIENT
Start: 2023-01-01 | End: 2023-01-01

## 2023-01-01 RX ORDER — PANTOPRAZOLE SODIUM 20 MG/1
1 TABLET, DELAYED RELEASE ORAL
Qty: 0 | Refills: 0 | DISCHARGE
Start: 2023-01-01

## 2023-01-01 RX ORDER — RISPERIDONE 4 MG/1
0.5 TABLET ORAL AT BEDTIME
Refills: 0 | Status: DISCONTINUED | OUTPATIENT
Start: 2023-01-01 | End: 2023-01-01

## 2023-01-01 RX ORDER — OMEPRAZOLE 10 MG/1
1 CAPSULE, DELAYED RELEASE ORAL
Refills: 0 | DISCHARGE

## 2023-01-01 RX ORDER — IPRATROPIUM/ALBUTEROL SULFATE 18-103MCG
3 AEROSOL WITH ADAPTER (GRAM) INHALATION EVERY 6 HOURS
Refills: 0 | Status: DISCONTINUED | OUTPATIENT
Start: 2023-01-01 | End: 2023-01-01

## 2023-01-01 RX ORDER — SENNA PLUS 8.6 MG/1
2 TABLET ORAL AT BEDTIME
Refills: 0 | Status: DISCONTINUED | OUTPATIENT
Start: 2023-01-01 | End: 2023-01-01

## 2023-01-01 RX ORDER — OXYCODONE HYDROCHLORIDE 5 MG/1
2.5 TABLET ORAL EVERY 4 HOURS
Refills: 0 | Status: DISCONTINUED | OUTPATIENT
Start: 2023-01-01 | End: 2023-01-01

## 2023-01-01 RX ORDER — AMIODARONE HYDROCHLORIDE 400 MG/1
200 TABLET ORAL DAILY
Refills: 0 | Status: DISCONTINUED | OUTPATIENT
Start: 2023-01-01 | End: 2023-01-01

## 2023-01-01 RX ORDER — LIDOCAINE 4 G/100G
1 CREAM TOPICAL DAILY
Refills: 0 | Status: DISCONTINUED | OUTPATIENT
Start: 2023-01-01 | End: 2023-01-01

## 2023-01-01 RX ORDER — ATORVASTATIN CALCIUM 80 MG/1
10 TABLET, FILM COATED ORAL AT BEDTIME
Refills: 0 | Status: DISCONTINUED | OUTPATIENT
Start: 2023-01-01 | End: 2023-01-01

## 2023-01-01 RX ORDER — AMIODARONE HYDROCHLORIDE 200 MG/1
200 TABLET ORAL
Qty: 90 | Refills: 3 | Status: ACTIVE | COMMUNITY
Start: 2019-08-30 | End: 1900-01-01

## 2023-01-01 RX ORDER — LORAZEPAM 0.5 MG/1
0.5 TABLET ORAL
Qty: 60 | Refills: 0 | Status: ACTIVE | COMMUNITY
Start: 2018-05-11 | End: 1900-01-01

## 2023-01-01 RX ORDER — FOLIC ACID 0.8 MG
1 TABLET ORAL DAILY
Refills: 0 | Status: DISCONTINUED | OUTPATIENT
Start: 2023-01-01 | End: 2023-01-01

## 2023-01-01 RX ORDER — TIMOLOL 0.5 %
1 DROPS OPHTHALMIC (EYE)
Refills: 0 | Status: DISCONTINUED | OUTPATIENT
Start: 2023-01-01 | End: 2023-01-01

## 2023-01-01 RX ORDER — IPRATROPIUM/ALBUTEROL SULFATE 18-103MCG
3 AEROSOL WITH ADAPTER (GRAM) INHALATION ONCE
Refills: 0 | Status: COMPLETED | OUTPATIENT
Start: 2023-01-01 | End: 2023-01-01

## 2023-01-01 RX ORDER — SENNA PLUS 8.6 MG/1
2 TABLET ORAL
Qty: 0 | Refills: 0 | DISCHARGE
Start: 2023-01-01

## 2023-01-01 RX ORDER — AMLODIPINE BESYLATE 2.5 MG/1
5 TABLET ORAL DAILY
Refills: 0 | Status: DISCONTINUED | OUTPATIENT
Start: 2023-01-01 | End: 2023-01-01

## 2023-01-01 RX ORDER — PANTOPRAZOLE SODIUM 20 MG/1
40 TABLET, DELAYED RELEASE ORAL
Refills: 0 | Status: DISCONTINUED | OUTPATIENT
Start: 2023-01-01 | End: 2023-01-01

## 2023-01-01 RX ORDER — SODIUM CHLORIDE 9 MG/ML
1000 INJECTION INTRAMUSCULAR; INTRAVENOUS; SUBCUTANEOUS
Refills: 0 | Status: COMPLETED | OUTPATIENT
Start: 2023-01-01 | End: 2023-01-01

## 2023-01-01 RX ORDER — ACETAMINOPHEN 500 MG
2 TABLET ORAL
Qty: 0 | Refills: 0 | DISCHARGE
Start: 2023-01-01

## 2023-01-01 RX ORDER — RISPERIDONE 0.5 MG/1
0.5 TABLET, FILM COATED ORAL
Qty: 90 | Refills: 0 | Status: ACTIVE | COMMUNITY
Start: 2017-11-09 | End: 1900-01-01

## 2023-01-01 RX ORDER — ACETAMINOPHEN 500 MG
650 TABLET ORAL EVERY 6 HOURS
Refills: 0 | Status: DISCONTINUED | OUTPATIENT
Start: 2023-01-01 | End: 2023-01-01

## 2023-01-01 RX ORDER — OXYCODONE HYDROCHLORIDE 5 MG/1
5 TABLET ORAL EVERY 4 HOURS
Refills: 0 | Status: DISCONTINUED | OUTPATIENT
Start: 2023-01-01 | End: 2023-01-01

## 2023-01-01 RX ORDER — SODIUM CHLORIDE 9 MG/ML
1000 INJECTION INTRAMUSCULAR; INTRAVENOUS; SUBCUTANEOUS
Refills: 0 | Status: DISCONTINUED | OUTPATIENT
Start: 2023-01-01 | End: 2023-01-01

## 2023-01-01 RX ORDER — HYDROCHLOROTHIAZIDE 25 MG
1 TABLET ORAL
Refills: 0 | DISCHARGE

## 2023-01-01 RX ORDER — HYDROCHLOROTHIAZIDE 12.5 MG/1
12.5 CAPSULE ORAL
Qty: 30 | Refills: 2 | Status: ACTIVE | COMMUNITY
Start: 2023-01-01

## 2023-01-01 RX ORDER — ASPIRIN/CALCIUM CARB/MAGNESIUM 324 MG
81 TABLET ORAL DAILY
Refills: 0 | Status: DISCONTINUED | OUTPATIENT
Start: 2023-01-01 | End: 2023-01-01

## 2023-01-01 RX ORDER — APIXABAN 2.5 MG/1
2.5 TABLET, FILM COATED ORAL
Qty: 180 | Refills: 3 | Status: ACTIVE | COMMUNITY
Start: 2019-08-30 | End: 1900-01-01

## 2023-01-01 RX ORDER — APIXABAN 2.5 MG/1
2.5 TABLET, FILM COATED ORAL EVERY 12 HOURS
Refills: 0 | Status: DISCONTINUED | OUTPATIENT
Start: 2023-01-01 | End: 2023-01-01

## 2023-01-01 RX ORDER — SODIUM CHLORIDE 9 MG/ML
1000 INJECTION INTRAMUSCULAR; INTRAVENOUS; SUBCUTANEOUS ONCE
Refills: 0 | Status: COMPLETED | OUTPATIENT
Start: 2023-01-01 | End: 2023-01-01

## 2023-01-01 RX ORDER — TIMOLOL 0.5 %
1 DROPS OPHTHALMIC (EYE)
Qty: 0 | Refills: 0 | DISCHARGE
Start: 2023-01-01

## 2023-01-01 RX ORDER — ONDANSETRON 8 MG/1
4 TABLET, FILM COATED ORAL EVERY 8 HOURS
Refills: 0 | Status: DISCONTINUED | OUTPATIENT
Start: 2023-01-01 | End: 2023-01-01

## 2023-01-01 RX ORDER — POLYETHYLENE GLYCOL 3350 17 G/17G
17 POWDER, FOR SOLUTION ORAL DAILY
Refills: 0 | Status: DISCONTINUED | OUTPATIENT
Start: 2023-01-01 | End: 2023-01-01

## 2023-01-01 RX ORDER — TIMOLOL 0.5 %
1 DROPS OPHTHALMIC (EYE)
Refills: 0 | DISCHARGE

## 2023-01-01 RX ORDER — METOPROLOL TARTRATE 50 MG
1 TABLET ORAL
Refills: 0 | DISCHARGE

## 2023-01-01 RX ORDER — FOLIC ACID 0.8 MG
1 TABLET ORAL
Refills: 0 | DISCHARGE

## 2023-01-01 RX ORDER — POTASSIUM CHLORIDE 20 MEQ
40 PACKET (EA) ORAL ONCE
Refills: 0 | Status: COMPLETED | OUTPATIENT
Start: 2023-01-01 | End: 2023-01-01

## 2023-01-01 RX ORDER — AMLODIPINE BESYLATE 5 MG/1
5 TABLET ORAL
Qty: 90 | Refills: 3 | Status: ACTIVE | COMMUNITY
Start: 2019-08-30 | End: 1900-01-01

## 2023-01-01 RX ORDER — LANOLIN ALCOHOL/MO/W.PET/CERES
1 CREAM (GRAM) TOPICAL
Qty: 0 | Refills: 0 | DISCHARGE
Start: 2023-01-01

## 2023-01-01 RX ORDER — METOPROLOL TARTRATE 50 MG/1
50 TABLET, FILM COATED ORAL
Qty: 180 | Refills: 0 | Status: ACTIVE | COMMUNITY
Start: 2023-01-01

## 2023-01-01 RX ORDER — NITROFURANTOIN MACROCRYSTAL 50 MG
100 CAPSULE ORAL
Refills: 0 | Status: COMPLETED | OUTPATIENT
Start: 2023-01-01 | End: 2023-01-01

## 2023-01-01 RX ORDER — LIDOCAINE 4 G/100G
1 CREAM TOPICAL
Qty: 0 | Refills: 0 | DISCHARGE
Start: 2023-01-01

## 2023-01-01 RX ORDER — NALOXONE HYDROCHLORIDE 4 MG/.1ML
0.4 SPRAY NASAL ONCE
Refills: 0 | Status: DISCONTINUED | OUTPATIENT
Start: 2023-01-01 | End: 2023-01-01

## 2023-01-01 RX ORDER — TIMOLOL MALEATE 5 MG/ML
0.5 SOLUTION OPHTHALMIC
Qty: 15 | Refills: 0 | Status: ACTIVE | COMMUNITY
Start: 2018-12-28 | End: 1900-01-01

## 2023-01-01 RX ORDER — HYDROCHLOROTHIAZIDE 12.5 MG/1
12.5 CAPSULE ORAL
Qty: 90 | Refills: 3 | Status: DISCONTINUED | COMMUNITY
Start: 2020-10-09 | End: 2023-01-01

## 2023-01-01 RX ORDER — ACETAMINOPHEN 500 MG
650 TABLET ORAL ONCE
Refills: 0 | Status: COMPLETED | OUTPATIENT
Start: 2023-01-01 | End: 2023-01-01

## 2023-01-01 RX ORDER — METOPROLOL SUCCINATE 50 MG/1
50 TABLET, EXTENDED RELEASE ORAL
Qty: 180 | Refills: 3 | Status: DISCONTINUED | COMMUNITY
Start: 2018-02-02 | End: 2023-01-01

## 2023-01-01 RX ADMIN — APIXABAN 2.5 MILLIGRAM(S): 2.5 TABLET, FILM COATED ORAL at 05:24

## 2023-01-01 RX ADMIN — Medication 0.5 MILLIGRAM(S): at 22:28

## 2023-01-01 RX ADMIN — AMIODARONE HYDROCHLORIDE 200 MILLIGRAM(S): 400 TABLET ORAL at 06:16

## 2023-01-01 RX ADMIN — Medication 81 MILLIGRAM(S): at 12:35

## 2023-01-01 RX ADMIN — ATORVASTATIN CALCIUM 10 MILLIGRAM(S): 80 TABLET, FILM COATED ORAL at 22:11

## 2023-01-01 RX ADMIN — RISPERIDONE 0.5 MILLIGRAM(S): 4 TABLET ORAL at 21:44

## 2023-01-01 RX ADMIN — SODIUM CHLORIDE 70 MILLILITER(S): 9 INJECTION INTRAMUSCULAR; INTRAVENOUS; SUBCUTANEOUS at 06:16

## 2023-01-01 RX ADMIN — ATORVASTATIN CALCIUM 10 MILLIGRAM(S): 80 TABLET, FILM COATED ORAL at 21:43

## 2023-01-01 RX ADMIN — SENNA PLUS 2 TABLET(S): 8.6 TABLET ORAL at 22:28

## 2023-01-01 RX ADMIN — APIXABAN 2.5 MILLIGRAM(S): 2.5 TABLET, FILM COATED ORAL at 05:43

## 2023-01-01 RX ADMIN — AMIODARONE HYDROCHLORIDE 200 MILLIGRAM(S): 400 TABLET ORAL at 05:16

## 2023-01-01 RX ADMIN — AMLODIPINE BESYLATE 5 MILLIGRAM(S): 2.5 TABLET ORAL at 05:34

## 2023-01-01 RX ADMIN — Medication 1 TABLET(S): at 12:35

## 2023-01-01 RX ADMIN — LIDOCAINE 1 PATCH: 4 CREAM TOPICAL at 19:39

## 2023-01-01 RX ADMIN — Medication 100 MILLIGRAM(S): at 14:05

## 2023-01-01 RX ADMIN — POLYETHYLENE GLYCOL 3350 17 GRAM(S): 17 POWDER, FOR SOLUTION ORAL at 11:55

## 2023-01-01 RX ADMIN — LIDOCAINE 1 PATCH: 4 CREAM TOPICAL at 19:47

## 2023-01-01 RX ADMIN — RISPERIDONE 0.5 MILLIGRAM(S): 4 TABLET ORAL at 21:42

## 2023-01-01 RX ADMIN — APIXABAN 2.5 MILLIGRAM(S): 2.5 TABLET, FILM COATED ORAL at 05:34

## 2023-01-01 RX ADMIN — Medication 100 MILLIGRAM(S): at 06:14

## 2023-01-01 RX ADMIN — Medication 100 MILLIGRAM(S): at 23:47

## 2023-01-01 RX ADMIN — Medication 200 MILLIGRAM(S): at 14:05

## 2023-01-01 RX ADMIN — Medication 650 MILLIGRAM(S): at 23:29

## 2023-01-01 RX ADMIN — Medication 1 DROP(S): at 23:14

## 2023-01-01 RX ADMIN — POLYETHYLENE GLYCOL 3350 17 GRAM(S): 17 POWDER, FOR SOLUTION ORAL at 13:35

## 2023-01-01 RX ADMIN — AMLODIPINE BESYLATE 5 MILLIGRAM(S): 2.5 TABLET ORAL at 05:16

## 2023-01-01 RX ADMIN — POLYETHYLENE GLYCOL 3350 17 GRAM(S): 17 POWDER, FOR SOLUTION ORAL at 11:02

## 2023-01-01 RX ADMIN — Medication 650 MILLIGRAM(S): at 23:11

## 2023-01-01 RX ADMIN — Medication 3 MILLIGRAM(S): at 21:17

## 2023-01-01 RX ADMIN — Medication 1 DROP(S): at 17:20

## 2023-01-01 RX ADMIN — Medication 0.5 MILLIGRAM(S): at 12:35

## 2023-01-01 RX ADMIN — LIDOCAINE 1 PATCH: 4 CREAM TOPICAL at 23:26

## 2023-01-01 RX ADMIN — Medication 1 DROP(S): at 05:36

## 2023-01-01 RX ADMIN — POLYETHYLENE GLYCOL 3350 17 GRAM(S): 17 POWDER, FOR SOLUTION ORAL at 12:16

## 2023-01-01 RX ADMIN — ATORVASTATIN CALCIUM 10 MILLIGRAM(S): 80 TABLET, FILM COATED ORAL at 22:27

## 2023-01-01 RX ADMIN — LIDOCAINE 1 PATCH: 4 CREAM TOPICAL at 13:35

## 2023-01-01 RX ADMIN — Medication 1 DROP(S): at 17:17

## 2023-01-01 RX ADMIN — Medication 100 MILLIGRAM(S): at 17:25

## 2023-01-01 RX ADMIN — POLYETHYLENE GLYCOL 3350 17 GRAM(S): 17 POWDER, FOR SOLUTION ORAL at 13:49

## 2023-01-01 RX ADMIN — APIXABAN 2.5 MILLIGRAM(S): 2.5 TABLET, FILM COATED ORAL at 17:05

## 2023-01-01 RX ADMIN — AMLODIPINE BESYLATE 5 MILLIGRAM(S): 2.5 TABLET ORAL at 05:43

## 2023-01-01 RX ADMIN — PANTOPRAZOLE SODIUM 40 MILLIGRAM(S): 20 TABLET, DELAYED RELEASE ORAL at 05:15

## 2023-01-01 RX ADMIN — ATORVASTATIN CALCIUM 10 MILLIGRAM(S): 80 TABLET, FILM COATED ORAL at 22:20

## 2023-01-01 RX ADMIN — Medication 100 MILLIGRAM(S): at 05:43

## 2023-01-01 RX ADMIN — APIXABAN 2.5 MILLIGRAM(S): 2.5 TABLET, FILM COATED ORAL at 18:01

## 2023-01-01 RX ADMIN — ATORVASTATIN CALCIUM 10 MILLIGRAM(S): 80 TABLET, FILM COATED ORAL at 22:24

## 2023-01-01 RX ADMIN — POLYETHYLENE GLYCOL 3350 17 GRAM(S): 17 POWDER, FOR SOLUTION ORAL at 11:46

## 2023-01-01 RX ADMIN — Medication 100 MILLIGRAM(S): at 22:20

## 2023-01-01 RX ADMIN — AMIODARONE HYDROCHLORIDE 200 MILLIGRAM(S): 400 TABLET ORAL at 05:50

## 2023-01-01 RX ADMIN — Medication 3 MILLIGRAM(S): at 22:11

## 2023-01-01 RX ADMIN — Medication 0.5 MILLIGRAM(S): at 00:21

## 2023-01-01 RX ADMIN — RISPERIDONE 0.5 MILLIGRAM(S): 4 TABLET ORAL at 22:11

## 2023-01-01 RX ADMIN — Medication 650 MILLIGRAM(S): at 22:25

## 2023-01-01 RX ADMIN — Medication 1 DROP(S): at 05:46

## 2023-01-01 RX ADMIN — AMIODARONE HYDROCHLORIDE 200 MILLIGRAM(S): 400 TABLET ORAL at 05:35

## 2023-01-01 RX ADMIN — APIXABAN 2.5 MILLIGRAM(S): 2.5 TABLET, FILM COATED ORAL at 17:17

## 2023-01-01 RX ADMIN — LIDOCAINE 1 PATCH: 4 CREAM TOPICAL at 23:35

## 2023-01-01 RX ADMIN — AMIODARONE HYDROCHLORIDE 200 MILLIGRAM(S): 400 TABLET ORAL at 05:43

## 2023-01-01 RX ADMIN — SENNA PLUS 2 TABLET(S): 8.6 TABLET ORAL at 21:43

## 2023-01-01 RX ADMIN — SODIUM CHLORIDE 1000 MILLILITER(S): 9 INJECTION INTRAMUSCULAR; INTRAVENOUS; SUBCUTANEOUS at 13:07

## 2023-01-01 RX ADMIN — AMLODIPINE BESYLATE 5 MILLIGRAM(S): 2.5 TABLET ORAL at 06:16

## 2023-01-01 RX ADMIN — RISPERIDONE 0.5 MILLIGRAM(S): 4 TABLET ORAL at 21:17

## 2023-01-01 RX ADMIN — APIXABAN 2.5 MILLIGRAM(S): 2.5 TABLET, FILM COATED ORAL at 17:25

## 2023-01-01 RX ADMIN — LIDOCAINE 1 PATCH: 4 CREAM TOPICAL at 19:38

## 2023-01-01 RX ADMIN — ATORVASTATIN CALCIUM 10 MILLIGRAM(S): 80 TABLET, FILM COATED ORAL at 21:16

## 2023-01-01 RX ADMIN — Medication 100 MILLIGRAM(S): at 21:17

## 2023-01-01 RX ADMIN — LIDOCAINE 1 PATCH: 4 CREAM TOPICAL at 23:00

## 2023-01-01 RX ADMIN — POLYETHYLENE GLYCOL 3350 17 GRAM(S): 17 POWDER, FOR SOLUTION ORAL at 11:40

## 2023-01-01 RX ADMIN — LIDOCAINE 1 PATCH: 4 CREAM TOPICAL at 13:49

## 2023-01-01 RX ADMIN — Medication 100 MILLIGRAM(S): at 18:00

## 2023-01-01 RX ADMIN — Medication 0.5 MILLIGRAM(S): at 23:29

## 2023-01-01 RX ADMIN — LIDOCAINE 1 PATCH: 4 CREAM TOPICAL at 00:00

## 2023-01-01 RX ADMIN — Medication 3 MILLIGRAM(S): at 21:43

## 2023-01-01 RX ADMIN — LIDOCAINE 1 PATCH: 4 CREAM TOPICAL at 12:13

## 2023-01-01 RX ADMIN — ATORVASTATIN CALCIUM 10 MILLIGRAM(S): 80 TABLET, FILM COATED ORAL at 21:55

## 2023-01-01 RX ADMIN — LIDOCAINE 1 PATCH: 4 CREAM TOPICAL at 01:11

## 2023-01-01 RX ADMIN — Medication 200 MILLIGRAM(S): at 22:20

## 2023-01-01 RX ADMIN — LIDOCAINE 1 PATCH: 4 CREAM TOPICAL at 01:26

## 2023-01-01 RX ADMIN — APIXABAN 2.5 MILLIGRAM(S): 2.5 TABLET, FILM COATED ORAL at 05:46

## 2023-01-01 RX ADMIN — AMIODARONE HYDROCHLORIDE 200 MILLIGRAM(S): 400 TABLET ORAL at 05:46

## 2023-01-01 RX ADMIN — APIXABAN 2.5 MILLIGRAM(S): 2.5 TABLET, FILM COATED ORAL at 05:35

## 2023-01-01 RX ADMIN — AMLODIPINE BESYLATE 5 MILLIGRAM(S): 2.5 TABLET ORAL at 05:50

## 2023-01-01 RX ADMIN — Medication 30 MILLILITER(S): at 17:05

## 2023-01-01 RX ADMIN — AMIODARONE HYDROCHLORIDE 200 MILLIGRAM(S): 400 TABLET ORAL at 05:33

## 2023-01-01 RX ADMIN — APIXABAN 2.5 MILLIGRAM(S): 2.5 TABLET, FILM COATED ORAL at 18:00

## 2023-01-01 RX ADMIN — Medication 40 MILLIEQUIVALENT(S): at 14:30

## 2023-01-01 RX ADMIN — Medication 1 DROP(S): at 05:34

## 2023-01-01 RX ADMIN — SENNA PLUS 2 TABLET(S): 8.6 TABLET ORAL at 21:55

## 2023-01-01 RX ADMIN — Medication 1 DROP(S): at 17:05

## 2023-01-01 RX ADMIN — APIXABAN 2.5 MILLIGRAM(S): 2.5 TABLET, FILM COATED ORAL at 17:46

## 2023-01-01 RX ADMIN — Medication 100 MILLIGRAM(S): at 05:45

## 2023-01-01 RX ADMIN — Medication 1 MILLIGRAM(S): at 12:35

## 2023-01-01 RX ADMIN — ATORVASTATIN CALCIUM 10 MILLIGRAM(S): 80 TABLET, FILM COATED ORAL at 23:11

## 2023-01-01 RX ADMIN — Medication 3 MILLILITER(S): at 23:47

## 2023-01-01 RX ADMIN — Medication 100 MILLIGRAM(S): at 05:50

## 2023-01-01 RX ADMIN — APIXABAN 2.5 MILLIGRAM(S): 2.5 TABLET, FILM COATED ORAL at 17:20

## 2023-01-01 RX ADMIN — AMLODIPINE BESYLATE 5 MILLIGRAM(S): 2.5 TABLET ORAL at 05:23

## 2023-01-01 RX ADMIN — Medication 0.5 MILLIGRAM(S): at 23:49

## 2023-01-01 RX ADMIN — Medication 3 MILLILITER(S): at 11:10

## 2023-01-01 RX ADMIN — Medication 1 DROP(S): at 06:15

## 2023-01-01 RX ADMIN — RISPERIDONE 0.5 MILLIGRAM(S): 4 TABLET ORAL at 21:43

## 2023-01-01 RX ADMIN — APIXABAN 2.5 MILLIGRAM(S): 2.5 TABLET, FILM COATED ORAL at 05:50

## 2023-01-01 RX ADMIN — APIXABAN 2.5 MILLIGRAM(S): 2.5 TABLET, FILM COATED ORAL at 18:05

## 2023-01-01 RX ADMIN — Medication 1 DROP(S): at 05:44

## 2023-01-01 RX ADMIN — Medication 1 DROP(S): at 17:57

## 2023-01-01 RX ADMIN — LIDOCAINE 1 PATCH: 4 CREAM TOPICAL at 19:09

## 2023-01-01 RX ADMIN — AMLODIPINE BESYLATE 5 MILLIGRAM(S): 2.5 TABLET ORAL at 05:46

## 2023-01-01 RX ADMIN — RISPERIDONE 0.5 MILLIGRAM(S): 4 TABLET ORAL at 23:12

## 2023-01-01 RX ADMIN — AMIODARONE HYDROCHLORIDE 200 MILLIGRAM(S): 400 TABLET ORAL at 06:15

## 2023-01-01 RX ADMIN — Medication 200 MILLIGRAM(S): at 01:43

## 2023-01-01 RX ADMIN — Medication 1 DROP(S): at 05:17

## 2023-01-01 RX ADMIN — Medication 1 DROP(S): at 18:00

## 2023-01-01 RX ADMIN — APIXABAN 2.5 MILLIGRAM(S): 2.5 TABLET, FILM COATED ORAL at 18:46

## 2023-01-01 RX ADMIN — APIXABAN 2.5 MILLIGRAM(S): 2.5 TABLET, FILM COATED ORAL at 17:30

## 2023-01-01 RX ADMIN — Medication 1 DROP(S): at 18:06

## 2023-01-01 RX ADMIN — APIXABAN 2.5 MILLIGRAM(S): 2.5 TABLET, FILM COATED ORAL at 06:16

## 2023-01-01 RX ADMIN — Medication 1 DROP(S): at 17:50

## 2023-01-01 RX ADMIN — RISPERIDONE 0.5 MILLIGRAM(S): 4 TABLET ORAL at 22:20

## 2023-01-01 RX ADMIN — LIDOCAINE 1 PATCH: 4 CREAM TOPICAL at 19:23

## 2023-01-01 RX ADMIN — Medication 100 MILLIGRAM(S): at 21:50

## 2023-01-01 RX ADMIN — Medication 650 MILLIGRAM(S): at 16:06

## 2023-01-01 RX ADMIN — RISPERIDONE 0.5 MILLIGRAM(S): 4 TABLET ORAL at 22:25

## 2023-01-01 RX ADMIN — Medication 200 MILLIGRAM(S): at 22:11

## 2023-01-01 RX ADMIN — LIDOCAINE 1 PATCH: 4 CREAM TOPICAL at 19:15

## 2023-01-01 RX ADMIN — RISPERIDONE 0.5 MILLIGRAM(S): 4 TABLET ORAL at 21:58

## 2023-01-01 RX ADMIN — LIDOCAINE 1 PATCH: 4 CREAM TOPICAL at 11:55

## 2023-01-01 RX ADMIN — Medication 1 DROP(S): at 17:46

## 2023-01-01 RX ADMIN — Medication 1 DROP(S): at 19:55

## 2023-01-01 RX ADMIN — Medication 1 DROP(S): at 06:16

## 2023-01-01 RX ADMIN — ATORVASTATIN CALCIUM 10 MILLIGRAM(S): 80 TABLET, FILM COATED ORAL at 21:41

## 2023-01-01 RX ADMIN — LIDOCAINE 1 PATCH: 4 CREAM TOPICAL at 11:47

## 2023-01-01 RX ADMIN — POLYETHYLENE GLYCOL 3350 17 GRAM(S): 17 POWDER, FOR SOLUTION ORAL at 12:14

## 2023-01-01 RX ADMIN — Medication 1 DROP(S): at 05:24

## 2023-01-01 RX ADMIN — APIXABAN 2.5 MILLIGRAM(S): 2.5 TABLET, FILM COATED ORAL at 17:48

## 2023-01-01 RX ADMIN — Medication 100 MILLIGRAM(S): at 22:24

## 2023-01-01 RX ADMIN — SENNA PLUS 2 TABLET(S): 8.6 TABLET ORAL at 21:42

## 2023-01-01 RX ADMIN — LIDOCAINE 1 PATCH: 4 CREAM TOPICAL at 11:02

## 2023-01-01 RX ADMIN — Medication 100 MILLIGRAM(S): at 00:21

## 2023-01-01 RX ADMIN — APIXABAN 2.5 MILLIGRAM(S): 2.5 TABLET, FILM COATED ORAL at 05:16

## 2023-01-01 RX ADMIN — Medication 0.5 MILLIGRAM(S): at 23:12

## 2023-01-01 RX ADMIN — LIDOCAINE 1 PATCH: 4 CREAM TOPICAL at 23:34

## 2023-01-01 RX ADMIN — LIDOCAINE 1 PATCH: 4 CREAM TOPICAL at 19:37

## 2023-01-01 RX ADMIN — RISPERIDONE 0.5 MILLIGRAM(S): 4 TABLET ORAL at 22:28

## 2023-01-01 RX ADMIN — ATORVASTATIN CALCIUM 10 MILLIGRAM(S): 80 TABLET, FILM COATED ORAL at 21:44

## 2023-01-01 RX ADMIN — LIDOCAINE 1 PATCH: 4 CREAM TOPICAL at 11:40

## 2023-01-01 RX ADMIN — Medication 100 MILLIGRAM(S): at 18:05

## 2023-01-01 RX ADMIN — AMLODIPINE BESYLATE 5 MILLIGRAM(S): 2.5 TABLET ORAL at 05:35

## 2023-01-01 RX ADMIN — SODIUM CHLORIDE 75 MILLILITER(S): 9 INJECTION INTRAMUSCULAR; INTRAVENOUS; SUBCUTANEOUS at 01:31

## 2023-01-01 RX ADMIN — Medication 0.5 MILLIGRAM(S): at 00:00

## 2023-01-01 RX ADMIN — Medication 3 MILLIGRAM(S): at 22:28

## 2023-01-01 RX ADMIN — Medication 1 DROP(S): at 05:50

## 2023-01-01 RX ADMIN — Medication 3 MILLIGRAM(S): at 22:24

## 2023-01-01 RX ADMIN — LIDOCAINE 1 PATCH: 4 CREAM TOPICAL at 19:32

## 2023-01-01 RX ADMIN — AMIODARONE HYDROCHLORIDE 200 MILLIGRAM(S): 400 TABLET ORAL at 05:23

## 2023-01-01 RX ADMIN — Medication 3 MILLIGRAM(S): at 22:51

## 2023-01-01 RX ADMIN — LIDOCAINE 1 PATCH: 4 CREAM TOPICAL at 11:46

## 2023-01-01 RX ADMIN — LIDOCAINE 1 PATCH: 4 CREAM TOPICAL at 12:16

## 2023-01-01 RX ADMIN — POLYETHYLENE GLYCOL 3350 17 GRAM(S): 17 POWDER, FOR SOLUTION ORAL at 11:47

## 2023-01-01 RX ADMIN — SENNA PLUS 2 TABLET(S): 8.6 TABLET ORAL at 23:13

## 2023-01-01 RX ADMIN — Medication 200 MILLIGRAM(S): at 06:14

## 2023-01-01 RX ADMIN — APIXABAN 2.5 MILLIGRAM(S): 2.5 TABLET, FILM COATED ORAL at 06:14

## 2023-01-20 NOTE — HISTORY OF PRESENT ILLNESS
[de-identified] : This is a 89-year-old patient with a history of hypercholesterolemia, atrial tachycardia, hypertension, supraventricular tachycardia who is here today for follow-up visit

## 2023-01-20 NOTE — PLAN
[FreeTextEntry1] : This is a 89-year-old patient who looks younger than stated age who is here today for a follow-up visit who has a history of tachycardia, SVT hypercholesterolemia and an elevated A1c.  The patient presently is that is on calcium channel blocker and also is on Eliquis I explained to her and the son about the importance of preventing falls as sure is no antidote to the Eliquis.  Her blood pressure was stable at 120/70 and her cardiac rhythm was normal.  There was no evidence of congestive heart failure as manifested by jugular venous engorgement or HJR or rales in the lung.  In addition she has no pedal edema her EKG revealed sinus rhythm with no arrhythmia.  Bloods were obtained to check her sugar A1c and cholesterol

## 2023-04-28 NOTE — ASSESSMENT
[FreeTextEntry1] : Problems\par Cardiac arrhythmias-the patient is presently being treated with metoprolol 50 mg daily, Eliquis 2-1/2 mg twice a day.  I advised the patient as to the risks of bleeding from the Antithrombin inhibitor and that there is no antidote should she complete.  I advised her that if she did not notice any bleeding or hit her head she would have to hold immediately\par Hypertension-the patient continues on amlodipine 5 mg daily and hydrochlorothiazide 12-1/2 mg daily.  As these could be electrolyte depleting I ordered a sodium potassium BUN and creatinine on the bloods today\par Hypercholesterolemia-she is on atorvastatin 10 mg daily and bloods were drawn to assess her liver function which included an SGOT SGPT and alkaline phosphatase

## 2023-04-28 NOTE — HISTORY OF PRESENT ILLNESS
[de-identified] : This is a 89-year-old patient with a history of atrial tachycardia and SVT that is being treated with beta-blockers and an Antithrombin inhibitor.  She also has a history of hypertension and hypercholesterolemia.  She is here today for follow-up visit

## 2023-07-28 NOTE — ASSESSMENT
[FreeTextEntry1] : Problems\par Hypertension\par SVT\par Atrial tachycardia\par Anticoagulation use\par Hypercholesterolemia\par The patient blood pressure was normal her physical examination did not reveal any evidence of congestive heart failure or tachycardia.  The patient presently continues on her amiodarone 1 tablet daily and also on her Eliquis 2-1/2 mg twice a day and metoprolol 50 mg daily.  Bloods were drawn to check her sodium potassium and electrolytes.

## 2023-07-28 NOTE — HISTORY OF PRESENT ILLNESS
[de-identified] : This is a 89-year-old patient who looks much younger than her stated age who has a history of supraventricular tachycardia which is presently being treated with beta-blocker and also an Antithrombin inhibitor to prevent embolic phenomena.  In addition she has a history of hypertension and is here today for follow-up visit

## 2023-08-17 NOTE — PHYSICAL THERAPY INITIAL EVALUATION ADULT - ASSISTIVE DEVICE FOR TRANSFER: GAIT, REHAB EVAL
rolling walker Closure 3 Information: This tab is for additional flaps and grafts above and beyond our usual structured repairs.  Please note if you enter information here it will not currently bill and you will need to add the billing information manually.

## 2023-08-26 NOTE — ASU PATIENT PROFILE, ADULT - NS PRO PASSIVE SMOKE EXP
For information on Fall & Injury Prevention, visit: [email or web address] OR  [email or web address] OR  https://www.cdc.gov/steadi/patient.html Yes...

## 2023-09-18 NOTE — ED ADULT NURSE NOTE - PAIN RATING/NUMBER SCALE (0-10): REST
Appears to be in excellent health. Health maintenance including Pap smear, mammogram, and colonoscopy up-to-date. Some review of her reflux esophagitis with appropriate use of omeprazole. Blood pressure well controlled. Recheck blood sugar, kidney function, and lipid profile. Continue sertraline 100 mg daily. Alprazolam at bedtime as needed for sleep. Recommend COVID booster. She had a flu shot and has an appointment for shingles vaccine. Recheck 1 year or as needed. Wellness Visit for Adults   AMBULATORY CARE:   A wellness visit  is when you see your healthcare provider to get screened for health problems. Your healthcare provider will also give you advice on how to stay healthy. Write down your questions so you remember to ask them. Ask your healthcare provider how often you should have a wellness visit. What happens at a wellness visit:  Your healthcare provider will ask about your health, and your family history of health problems. This includes high blood pressure, heart disease, and cancer. He or she will ask if you have symptoms that concern you, if you smoke, and about your mood. You may also be asked about your intake of medicines, supplements, food, and alcohol. Any of the following may be done: Your weight  will be checked. Your height may also be checked so your body mass index (BMI) can be calculated. Your BMI shows if you are at a healthy weight. Your blood pressure  and heart rate will be checked. Your temperature may also be checked. Blood and urine tests  may be done. Blood tests may be done to check your cholesterol levels. Abnormal cholesterol levels increase your risk for heart disease and stroke. You may also need a blood or urine test to check for diabetes if you are at increased risk. Urine tests may be done to look for signs of an infection or kidney disease. A physical exam  includes checking your heartbeat and lungs with a stethoscope.  Your healthcare provider may also check your skin to look for sun damage. Screening tests  may be recommended. A screening test is done to check for diseases that may not cause symptoms. The screening tests you may need depend on your age, gender, family history, and lifestyle habits. For example, colorectal screening may be recommended if you are 48years old or older. Screening tests you need if you are a woman:   A Pap smear  is used to screen for cervical cancer. Pap smears are usually done every 3 to 5 years depending on your age. You may need them more often if you have had abnormal Pap smear test results in the past. Ask your healthcare provider how often you should have a Pap smear. A mammogram  is an x-ray of your breasts to screen for breast cancer. Experts recommend mammograms every 2 years starting at age 48 years. You may need a mammogram at age 52 years or younger if you have an increased risk for breast cancer. Talk to your healthcare provider about when you should start having mammograms and how often you need them. Vaccines you may need:   Get an influenza vaccine  every year. The influenza vaccine protects you from the flu. Several types of viruses cause the flu. The viruses change over time, so new vaccines are made each year. Get a tetanus-diphtheria (Td) booster vaccine  every 10 years. This vaccine protects you against tetanus and diphtheria. Tetanus is a severe infection that may cause painful muscle spasms and lockjaw. Diphtheria is a severe bacterial infection that causes a thick covering in the back of your mouth and throat. Get a human papillomavirus (HPV) vaccine  if you are female and aged 23 to 32 or male 23 to 24 and never received it. This vaccine protects you from HPV infection. HPV is the most common infection spread by sexual contact. HPV may also cause vaginal, penile, and anal cancers. Get a pneumococcal vaccine  if you are aged 72 years or older.  The pneumococcal vaccine is an injection given to protect you from pneumococcal disease. Pneumococcal disease is an infection caused by pneumococcal bacteria. The infection may cause pneumonia, meningitis, or an ear infection. Get a shingles vaccine  if you are 60 or older, even if you have had shingles before. The shingles vaccine is an injection to protect you from the varicella-zoster virus. This is the same virus that causes chickenpox. Shingles is a painful rash that develops in people who had chickenpox or have been exposed to the virus. How to eat healthy:  My Plate is a model for planning healthy meals. It shows the types and amounts of foods that should go on your plate. Fruits and vegetables make up about half of your plate, and grains and protein make up the other half. A serving of dairy is included on the side of your plate. The amount of calories and serving sizes you need depends on your age, gender, weight, and height. Examples of healthy foods are listed below:  Eat a variety of vegetables  such as dark green, red, and orange vegetables. You can also include canned vegetables low in sodium (salt) and frozen vegetables without added butter or sauces. Eat a variety of fresh fruits , canned fruit in 100% juice, frozen fruit, and dried fruit. Include whole grains. At least half of the grains you eat should be whole grains. Examples include whole-wheat bread, wheat pasta, brown rice, and whole-grain cereals such as oatmeal.    Eat a variety of protein foods such as seafood (fish and shellfish), lean meat, and poultry without skin (turkey and chicken). Examples of lean meats include pork leg, shoulder, or tenderloin, and beef round, sirloin, tenderloin, and extra lean ground beef. Other protein foods include eggs and egg substitutes, beans, peas, soy products, nuts, and seeds. Choose low-fat dairy products such as skim or 1% milk or low-fat yogurt, cheese, and cottage cheese.     Limit unhealthy fats  such as butter, hard margarine, and shortening. Exercise:  Exercise at least 30 minutes per day on most days of the week. Some examples of exercise include walking, biking, dancing, and swimming. You can also fit in more physical activity by taking the stairs instead of the elevator or parking farther away from stores. Include muscle strengthening activities 2 days each week. Regular exercise provides many health benefits. It helps you manage your weight, and decreases your risk for type 2 diabetes, heart disease, stroke, and high blood pressure. Exercise can also help improve your mood. Ask your healthcare provider about the best exercise plan for you. General health and safety guidelines:   Do not smoke. Nicotine and other chemicals in cigarettes and cigars can cause lung damage. Ask your healthcare provider for information if you currently smoke and need help to quit. E-cigarettes or smokeless tobacco still contain nicotine. Talk to your healthcare provider before you use these products. Limit alcohol. A drink of alcohol is 12 ounces of beer, 5 ounces of wine, or 1½ ounces of liquor. Lose weight, if needed. Being overweight increases your risk of certain health conditions. These include heart disease, high blood pressure, type 2 diabetes, and certain types of cancer. Protect your skin. Do not sunbathe or use tanning beds. Use sunscreen with a SPF 15 or higher. Apply sunscreen at least 15 minutes before you go outside. Reapply sunscreen every 2 hours. Wear protective clothing, hats, and sunglasses when you are outside. Drive safely. Always wear your seatbelt. Make sure everyone in your car wears a seatbelt. A seatbelt can save your life if you are in an accident. Do not use your cell phone when you are driving. This could distract you and cause an accident. Pull over if you need to make a call or send a text message. Practice safe sex.   Use latex condoms if are sexually active and have more than one partner. Your healthcare provider may recommend screening tests for sexually transmitted infections (STIs). Wear helmets, lifejackets, and protective gear. Always wear a helmet when you ride a bike or motorcycle, go skiing, or play sports that could cause a head injury. Wear protective equipment when you play sports. Wear a lifejacket when you are on a boat or doing water sports. © Copyright [facility] 2022 Information is for End User's use only and may not be sold, redistributed or otherwise used for commercial purposes. The above information is an  only. It is not intended as medical advice for individual conditions or treatments. Talk to your doctor, nurse or pharmacist before following any medical regimen to see if it is safe and effective for you. 0

## 2023-10-08 NOTE — H&P ADULT - PROBLEM SELECTOR PROBLEM 5
chest pain Bed/Stretcher in lowest position, wheels locked, appropriate side rails in place/Call bell, personal items and telephone in reach/Instruct patient to call for assistance before getting out of bed/chair/stretcher/Non-slip footwear applied when patient is off stretcher/[place] to call system/Physically safe environment - no spills, clutter or unnecessary equipment/Purposeful proactive rounding/Room/bathroom lighting operational, light cord in reach Chronic atrial fibrillation

## 2023-10-08 NOTE — H&P ADULT - PROBLEM SELECTOR PLAN 2
patient with history of AF on Amio and BB  will hold off BB as ow HR may be responsible for some of the symptoms  if needed, will resume a half dose  monitor vitals closely  Echo

## 2023-10-08 NOTE — ED PROVIDER NOTE - ATTENDING CONTRIBUTION TO CARE
attending Matt: 89yF h/o HTN, HLD, Afib on amiodarone, eliquis and metoprolol, non smoker lives at home with 24 HHA p/w generalized constant abdominal pain since this morning. Currently 4/10 but reportedly more severe this morning. No prior abdominal surgeries. Also with some wheezing, noted to be hypoxic on arrival to ED. Exam as above. Will maintain on supplemental O2 PRN, labs, ekg, CTA eval for mesenteric ischemia, CT chest for wheezing, nebs, reassess

## 2023-10-08 NOTE — ED ADULT TRIAGE NOTE - TEMPERATURE IN FAHRENHEIT (DEGREES F)
Continue testosterone Injections-- 200mg/ml- 0 3ml (60mg) per week  Complete lab testing as ordered  97.8

## 2023-10-08 NOTE — ED PROVIDER NOTE - OBJECTIVE STATEMENT
89 y/o female PMhx HTN, HLD, atrial fib compliant on amiodarone, eliquis and metop, non smoker has 24 HHA no PSx now presenting to the ED with generalized constant abdominal pain since this morning. Patient rates pain 4/10 currently but HHA stated the pain was severe this morning to the point patient was crying. Patient had normal BM this morning. Patient also has been wheezing according to HHA (no known COPD or asthma) and has non productive cough. Patient at baseline mental status per son and HHA at bedside. No known sick contacts. Denied CP, SOB, vomiting, diarrhea, nausea, sick contacts, fever, syncope, palpitations Statement Selected

## 2023-10-08 NOTE — ED PROVIDER NOTE - NSICDXFAMILYHX_GEN_ALL_CORE_FT
FAMILY HISTORY:  Uncle  Still living? No  Family history of acute myocardial infarction, Age at diagnosis: Age Unknown  Family history of hypertension, Age at diagnosis: Age Unknown

## 2023-10-08 NOTE — ED ADULT NURSE NOTE - OBJECTIVE STATEMENT
89 yo female Alert and oriented to person and place, not time, BIB son and aide from home c/o abd pain. Patient c/o generalized abd pain, nontender on palpation since this morning. Patient also c/o lower mid back pain, nonradiating. Patient denies n/v/d. Denies any blood in stool. Denies fevers/chills. Abd appears distended, soft. Denies SOB, SPO2 88% rm air, placed on 3LPM NC, improves to 96%. Denies cough. Denies chest pain, LOC, dizziness.

## 2023-10-08 NOTE — H&P ADULT - ASSESSMENT
Patient is a 89yo Female with past medical history of mild dementia, HTN, HLD, atrial fib on amiodarone, eliquis and metop, from [facility] with 24 HHA presented to the ED with generalized constant abdominal pain since this morning with SOB and reported wheezing .   HHA stated the pain was severe this morning but much better now.. Patient had normal BM this morning.   Patient also has been wheezing according to HHA (no known COPD or asthma) and has non productive cough.   ? reprot of hypoxemia > placed on NC O2   Patient at baseline mental status per son and HHA at bedside. No known sick contacts. Denied CP, SOB, vomiting, diarrhea, nausea, sick contacts, fever, syncope, palpitations  no fever or chills reported  on tele patient on sinus dasha in 40s while sleeping >> goes up to sinus dasha to 50s when woken up   Patient is a 89yo Female with past medical history of mild dementia, HTN, HLD, atrial fib on amiodarone, eliquis and metop, from [facility] with 24 HHA presented to the ED with generalized constant abdominal pain since this morning with SOB and reported wheezing .   HHA stated the pain was severe this morning but much better now.. Patient had normal BM this morning.   Patient also has been wheezing according to HHA (no known COPD or asthma) and has non productive cough.   ? report of hypoxemia > placed on NC O2   Patient at baseline mental status per son and HHA at bedside. No known sick contacts. Denied CP, SOB, vomiting, diarrhea, nausea, sick contacts, fever, syncope, palpitations  no fever or chills reported  on tele patient on sinus dasha in 40s while sleeping >> goes up to sinus dasha to 50s when woken up

## 2023-10-08 NOTE — H&P ADULT - PROBLEM SELECTOR PLAN 1
unclear etiology  pro BNP not significantly elevated for age  no clear signs of pneumonia  will check Echo  nebs PRN  wean off O2 as able  OOB to chair as able  RVP negative  CT as noted with nonspecific GGO  supportive care  monitor

## 2023-10-08 NOTE — ED PROVIDER NOTE - PROGRESS NOTE DETAILS
CELESTINA Ngo: patient hypoxic 89% room improved with 3L nasal cannula and has not required oxygen at home CELESTINA Ngo: hospitalist stated Dr. Derek Peterson admits to Dr. duenas CELESTINA Ngo: patient son filled MOLST DNR DNI placed in chart

## 2023-10-08 NOTE — H&P ADULT - NSHPADDITIONALINFOADULT_GEN_ALL_CORE
Dr. MIGUEL Kelly ([phone])    -- please note: the actual date of service /  note written for this date of service is certified to be the same as "ENTERED" date above by the EMR and is the actual date of service provided for this patient.)  this is for clarification purposes for [employer] ( billing / legal  purposes to validate actual date of service as being the same as "entered")   Dr. MIGUEL Kelly

## 2023-10-08 NOTE — H&P ADULT - HISTORY OF PRESENT ILLNESS
>>>>>>Medical Attending Initial / Admission note<<<<<<  -------------------------------------------------------------------------------  CHIEF COMPLAINT & HISTORY OF PRESENT ILLNESS:      Patient is a 89yo Female with past medical history of mild dementia, HTN, HLD, atrial fib on amiodarone, eliquis and metop, from [facility] with 24 HHA presented to the ED with generalized constant abdominal pain since this morning with SOB and reported wheezing .   HHA stated the pain was severe this morning but much better now.. Patient had normal BM this morning.   Patient also has been wheezing according to HHA (no known COPD or asthma) and has non productive cough.   ? reprot of hypoxemia > placed on NC O2   Patient at baseline mental status per son and HHA at bedside. No known sick contacts. Denied CP, SOB, vomiting, diarrhea, nausea, sick contacts, fever, syncope, palpitations  no fever or chills reported  on tele patient on sinus dasha in 40s while sleeping >> goes up to sinus dasha to 50s when woken up    --------------------------------------------------------------------------------  PAST MEDICAL / SURGICAL  HISTORY:    Hypertension  Hyperlipidemia  Delusional disorder  mild dementia per son, on Risperidone   Cataracts, bilateral  Atrial Fib    H/O fracture of patella  S/P breast biopsy  H/O cataract extraction  H/O fracture of patella  H/O fracture of patella    --------------------------------------------------------------------------------  FAMILY HISTORY:    Family history of hypertension (Uncle)  Family history of acute myocardial infarction (Uncle)    --------------------------------------------------------------------------------  SOCIAL HISTORY:  Alcohol: None reported  Smoking: None reported     --------------------------------------------------------------------------------  ALLERGIES:    [As bellow, reviewed]    --------------------------------------------------------------------------------  MEDICATIONS:   [As inocencia, reviewed]    --------------------------------------------------------------------------------  REVIEW OF SYSTEM:    limited as patient sleepy, not very cooperative    denies pain now, denies SOB now..     --------------------------------------------------------------------------------  VITAL SIGNS:    Weight (kg): 51.7  Vital Signs Last 24 HrsT(C): 36.3 (10-08-23 @ 15:04)  T(F): 97.4 (10-08-23 @ 15:04), Max: 97.8 (10-08-23 @ 10:26)  HR: 49 (10-08-23 @ 17:40) (48 - 59)  BP: 118/87 (10-08-23 @ 17:40)  RR: 18 (10-08-23 @ 17:40) (18 - 19)  SpO2: 98% (10-08-23 @ 17:40) (89% - 99%)        --------------------------------------------------------------------------------  PHYSICAL EXAM:    GEN: A&O X 1 , NAD , comfortable, pleasant, calm, sleepy   HEENT: NCAT, PERRL, MMM, hearing intact  Neck: supple , no JVD  CVS: S1S2 , regular , No M/R/G appreciated  PULM: CTA B/L,  limited exam as not taking deep breaths     ABD.: soft. non tender, non distended,  bowel sounds present  Extrem: intact pulses , no edema   Derm: No rash , no ecchymoses  PSYCH : normal mood,  no delusion not anxious    --------------------------------------------------------------------------------  LAB AND IMAGING:   [As inocencia, reviewed]    --------------------------------------------------------------------------------  ASSESSMENT AND PLAN:   [As bellow]    --------------------  Case discussed with pr, family, HHA  ___________________________  H. GLENN Kelly.  Pager: [phone]  10-08-23     ( Note written / Date of service 10-08-23 ( This is certified to be the same as "ENTERED" date above ( for billing Purposes )))

## 2023-10-08 NOTE — H&P ADULT - CONVERSATION DETAILS
*** Advance directive /  goals of care discussion      I had a long discussion with patient ( and family) about patient's overall diagnosis, expected prognosis, and potential complications.       Discussed treatment options, comfort care / hospice as appropriate, and all other potential options of care.         Discussed risks, benefits, and alternatives of treatment as well.          Opportunity given for and all questions answered.            Reviewed available advance directives as available > patient with an ld living will >> available, reviewed..      At this time patient to be DNR / DNI as requested with continuation of current medical therapy.     would try to do an official MOLST form at later time..      Goal is for pt to return > home and follow up as outpatient with current doctors      will continue to discuss GOC with pt and family and update plan as needed.     Patient's family have my contact information and will contact me with questions.     Additional time spent on Goals of care: 22 min.

## 2023-10-08 NOTE — H&P ADULT - PROBLEM SELECTOR PLAN 5
as above  continue Amio to keep in sinus  continue Eliquis  holding BB for now   monitor   cardio evaluation as needed

## 2023-10-09 NOTE — DISCHARGE NOTE PROVIDER - NSDCFUSCHEDAPPT_GEN_ALL_CORE_FT
[facility]  [facility] [address]  Scheduled Appointment: 10/20/2023     [facility]  [facility] [address]  Scheduled Appointment: 01/26/2024

## 2023-10-09 NOTE — DISCHARGE NOTE PROVIDER - NSDCCAREPROVSEEN_GEN_ALL_CORE_FT
Agustín Kelly Agustín Capellan  [facility]      [ Greater than 35 min spent for discharge services.   MIGUEL Kelly ]       ( Note written / Date of service is the same as last day of patient stay  in the hospital ( for billing purposes)))

## 2023-10-09 NOTE — DISCHARGE NOTE NURSING/CASE MANAGEMENT/SOCIAL WORK - NSDCPEFALRISK_GEN_ALL_CORE
For information on Fall & Injury Prevention, visit: [email or web address] OR  [email or web address] OR  https://www.cdc.gov/steadi/patient.html

## 2023-10-09 NOTE — PHYSICAL THERAPY INITIAL EVALUATION ADULT - PERTINENT HX OF CURRENT PROBLEM, REHAB EVAL
89F with past medical history of mild dementia, HTN, HLD, atrial fib on amiodarone, eliquis and metop, from [facility] with 24 HHA presented to the ED with generalized constant abdominal pain since this morning with SOB and reported wheezing . HHA stated the pain was severe this morning but much better now. Patient also has been wheezing according to HHA (no known COPD or asthma) and has non productive cough. Report of hypoxemia, placed on NC O2   Patient at baseline mental status per son and HHA at bedside.

## 2023-10-09 NOTE — PHYSICAL THERAPY INITIAL EVALUATION ADULT - ADDITIONAL COMMENTS
pt is poor historian; reports is visiting a friend from [place]; reports lives with family and ambulates independently at baseline; CM to follow up on social hx

## 2023-10-09 NOTE — DISCHARGE NOTE PROVIDER - NSDCCPCAREPLAN_GEN_ALL_CORE_FT
PRINCIPAL DISCHARGE DIAGNOSIS  Diagnosis: Acute respiratory failure with hypoxia  Assessment and Plan of Treatment: resolved  follow up with pcp within a few days  return if worsens      SECONDARY DISCHARGE DIAGNOSES  Diagnosis: Chronic atrial fibrillation  Assessment and Plan of Treatment: follow up with pcp and cardiology in am    Diagnosis: Sinus bradycardia  Assessment and Plan of Treatment: Metoprolol held due to bradycardia  follow up with pcp in am    Diagnosis: Hyponatremia  Assessment and Plan of Treatment: HCTZ held, last sodium 136  follow up with pcp if you will resume med

## 2023-10-09 NOTE — DISCHARGE NOTE PROVIDER - NSDCMRMEDTOKEN_GEN_ALL_CORE_FT
amiodarone 200 mg oral tablet: 1 tab(s) orally once a day  apixaban 2.5 mg oral tablet: 1 tab(s) orally 2 times a day  aspirin 81 mg oral tablet, chewable: 1 tab(s) orally once a day  atorvastatin 10 mg oral tablet: 1 tab(s) orally once a day (at bedtime)  docusate sodium 100 mg oral capsule: 1 cap(s) orally 2 times a day  folic acid 1 mg oral tablet: 1 tab(s) orally once a day  hydroCHLOROthiazide 12.5 mg oral capsule: 1 cap(s) orally once a day  LORazepam 0.5 mg oral tablet: orally once a day  metoprolol tartrate 50 mg oral tablet: 1 tab(s) orally 2 times a day  Multiple Vitamins oral capsule: 1 cap(s) orally once a day. last dose11/28/2017  Norvasc 5 mg oral tablet: 1 tab(s) orally once a day  omeprazole 20 mg oral delayed release capsule: 1 cap(s) orally once a day as needed for abdominal pain  physical therapy: PT  risperiDONE 0.5 mg oral tablet: 1 tab(s) orally once a day (at bedtime)  Timolol Maleate (Eqv-Timoptic) 0.5% ophthalmic solution: 1 drop(s) in each affected eye 2 times a day   amiodarone 200 mg oral tablet: 1 tab(s) orally once a day  apixaban 2.5 mg oral tablet: 1 tab(s) orally 2 times a day  aspirin 81 mg oral tablet, chewable: 1 tab(s) orally once a day  atorvastatin 10 mg oral tablet: 1 tab(s) orally once a day (at bedtime)  docusate sodium 100 mg oral capsule: 1 cap(s) orally 2 times a day  folic acid 1 mg oral tablet: 1 tab(s) orally once a day  LORazepam 0.5 mg oral tablet: orally once a day  Multiple Vitamins oral capsule: 1 cap(s) orally once a day. last dose11/28/2017  Norvasc 5 mg oral tablet: 1 tab(s) orally once a day  omeprazole 20 mg oral delayed release capsule: 1 cap(s) orally once a day as needed for abdominal pain  risperiDONE 0.5 mg oral tablet: 1 tab(s) orally once a day (at bedtime)  Timolol Maleate (Eqv-Timoptic) 0.5% ophthalmic solution: 1 drop(s) in each affected eye 2 times a day

## 2023-10-09 NOTE — DISCHARGE NOTE NURSING/CASE MANAGEMENT/SOCIAL WORK - PATIENT PORTAL LINK FT
You can access the FollowMyHealth Patient Portal offered by [facility] by registering at the following website: [email or web address]. By joining [facility]’s FollowMyHealth portal, you will also be able to view your health information using other applications (apps) compatible with our system.

## 2023-10-09 NOTE — DISCHARGE NOTE PROVIDER - CARE PROVIDER_API CALL
Laura Peterson  Internal Medicine  [facility] [facility], [place], operated [facility], [address]  [place] [address]  Phone: [phone]  Fax: [phone]  Follow Up Time: 1-3 days

## 2023-10-09 NOTE — PROGRESS NOTE ADULT - ASSESSMENT
{\rtf1\yzowqd48357\ansi\wyngtin0252\ftnbj\uc1\deff0  {\fonttbl{\f0 \fnil Segoe UI;}{\f1 \fnil \fcharset0 Segoe UI;}{\f2 \fnil Times New Alf;}}  {\colortbl ;\gfc460\jirxs208\ieei892 ;\red0\green0\blue0 ;\red0\green0\okvv675 ;\red0\green0\blue0 ;}  {\stylesheet{\f0\fs20 Normal;}{\cs1 Default Paragraph Font;}{\cs2\f0\fs16 Line Number;}{\cs3\f2\fs24\ul\cf3 Hyperlink;}}  {\*\revtbl{Unknown;}}  \kxspvt34753\azxokw21231\kpmwz9823\outlv2356\uqqzo2047\ujvyg4568\daqizru653\plpflvc462\nogrowautofit\uzzurk644\formshade\nofeaturethrottle1\dntblnsbdb\fet4\aendnotes\aftnnrlc\pgbrdrhead\pgbrdrfoot  \sectd\xjfxxy07257\kxrooc08459\guttersxn0\yszklhrh7702\fmgbtghp8866\grrzppmh9776\ujgjwebj0052\bboppet822\kcsrazp396\sbkpage\pgncont\pgndec  \plain\plain\f0\fs24\pard\plain\f0\fs24\plain\f0\fs20\pzes5899\hich\f0\dbch\f0\loch\f0\fs20\par  _________________________________________________________________________________________\par  ========>>  M E D I C A L   A T T E N D I N G    F O L L O W  U P  N O T E  <<=========\par  -----------------------------------------------------------------------------------------------------\par  \par  - Patient seen and examined by me earlier today. \par  - In summary,  CONNER CELESTE is a 89y year old woman admitted with SOB, abd pain \par  - Patient today overall doing ok, comfortable, eating OK. \par    p overall much more alert..  though confused ( seems to be baseline)).. no CP, no SOB, no abd pain, bradycardia improved \par  \par  ==================>> REVIEW OF SYSTEM <<=================\par  \par  GEN: no fever, no chills, no pain\par  RESP: no SOB, no cough, no sputum\par  CVS: no chest pain, no palpitations, no edema\par  GI: no abdominal pain, no nausea, no constipation, no diarrhea\par  : no dysuria, no frequency, no hematuria\par  Neuro: no headache, no dizziness\par  Derm : no itching, no rash\par  \par  ==================>> PHYSICAL EXAM <<=================\par  \par  GEN: A&O X 1-2 , NAD , comfortable, pleasant, calm \par  HEENT: NCAT, PERRL, MMM, hearing intact\par  Neck: supple , no JVD appreciated\par  CVS: S1S2 , regular , No M/R/G appreciated\par  PULM: CTA B/L,  no W/R/R appreciated\par  ABD.: soft. non tender, non distended,  bowel sounds present\par  Extrem: intact pulses , no edema \par  PSYCH : normal mood,  a bit anxious / agitated \par  \par        ( Note written / Date of service 10-09-23 ( This is certified to be the same as "ENTERED" date above ( for billing purposes)))\par    ==================>> MEDICATIONS <<====================\par  \par  MEDICATIONS  (STANDING):\par  aMIOdarone    Tablet 200 milliGRAM(s) Oral daily\par  amLODIPine   Tablet 5 milliGRAM(s) Oral daily\par  apixaban 2.5 milliGRAM(s) Oral every 12 hours\par  aspirin  chewable 81 milliGRAM(s) Oral daily\par  atorvastatin 10 milliGRAM(s) Oral at bedtime\par  folic acid 1 milliGRAM(s) Oral daily\par  LORazepam     Tablet 0.5 milliGRAM(s) Oral daily\par  multivitamin 1 Tablet(s) Oral daily\par  pantoprazole    Tablet 40 milliGRAM(s) Oral before breakfast\par  risperiDONE   Tablet 0.5 milliGRAM(s) Oral at bedtime\par  senna 2 Tablet(s) Oral at bedtime\par  timolol 0.5% Solution 1 Drop(s) Both EYES two times a day\par  \par  MEDICATIONS  (PRN):\par  acetaminophen     Tablet .. 650 milliGRAM(s) Oral every 6 hours PRN Temp greater or equal to 38C (100.4F), Mild Pain (1 - 3)\par  albuterol/ipratropium for Nebulization 3 milliLiter(s) Nebulizer every 6 hours PRN Shortness of Breath and/or Wheezing\par  aluminum hydroxide/magnesium hydroxide/simethicone Suspension 30 milliLiter(s) Oral every 4 hours PRN Dyspepsia\par  melatonin 3 milliGRAM(s) Oral at bedtime PRN Insomnia\par  ondansetron Injectable 4 milliGRAM(s) IV Push every 8 hours PRN Nausea and/or Vomiting\par  \par  ___________\par  Active diet:\par  Diet, Regular: \par  Soft and Bite Sized (SOFTBTSZ)\par  Supplement Feeding Modality:  Oral\par  Ensure Enlive Cans or Servings Per Day:  1       Frequency:  Daily\par  Ensure Pudding Cans or Servings Per Day:  2       Frequency:  Two Times a day\par  ___________________\par  \par  ==================>> VITAL SIGNS <<==================\par  \par  T(C): 36.9 (10-09-23 @ 11:26), Max: 36.9 (10-09-23 @ 04:48)\par  HR: 52 (10-09-23 @ 11:26) \plain\f1\fs20\tgsc6760\hich\f1\dbch\f1\loch\f1\cf2\fs20\b (48 - 61)\plain\f0\fs20\xoab3013\hich\f0\dbch\f0\loch\f0\fs20\par  BP: 113/63 (10-09-23 @ 11:26) (105/61 - 136/76)\par  BP(mean): 98 (10-08-23 @ 17:40)\par  RR: 18 (10-09-23 @ 11:26) (18 - 19)\par  SpO2: 93% (10-09-23 @ 11:26) (93% - 99%) \par  \par   ==================>> LAB AND IMAGING <<==================\par           \par             11.3 \par  7.87  )-----------( 194      ( 09 Oct 2023 06:44 )\par             35.1 \par     \par  10-09\par  \par  136  |  99  |  25<H>\par  ----------------------------<  95\par  4.2   |  24  |  1.21\par  \par  Ca    9.2      09 Oct 2023 06:44\par  Phos  3.2     10-08\par  Mg     2.1     10-08\par  \par  TPro  7.1  /  Alb  3.3  /  TBili  0.6  /  DBili  x   /  AST  58<H>  /  ALT  92<H>  /  AlkPhos  101  10-09\par  \par  Urinalysis Basic - ( 09 Oct 2023 06:44 )\par  Color: x / Appearance: x / SG: x / pH: x\par  Gluc: 95 mg/dL / Ketone: x  / Bili: x / Urobili: x \par  Blood: x / Protein: x / Nitrite: x \par  Leuk Esterase: x / RBC: x / WBC x \par  Sq Epi: x / Non Sq Epi: x / Bacteria: x\par  \par  Lipid profile:  (10-09-23)\par     Total: 129\par     LDL  : (p)\par     HDL  :61\par     TG   :62\par  \par  ___________________________________________________________________________________\par  ===============>>  A S S E S S M E N T   A N D   P L A N <<===============\par  ------------------------------------------------------------------------------------------\par  \par  \trowd\rwcnha15\lastrow\zxtqfkr80\trpaddfl3\cxsmygl26\trpaddfr3\trpaddt0\trpaddft3\trpaddb0\trpaddfb3\trleft0  \clvertalt\gxwfgb72\clpadft3\exlytd78\clpadfr3\clpadl0\clpadfl3\clpadb0\clpadfb3\lvxtn3708  \clvertalt\bpgmwv98\clpadft3\nnzzvr24\clpadfr3\clpadl0\clpadfl3\clpadb0\clpadfb3\ruhxn9818  \pard\intbl\ssparaaux0\s0\fi-120\li120\ql\plain\f0\fs24{\*\bkmkstart [id number]}{\*\bkmkend ue57891157275}\plain\f0\fs20\eplq8047\hich\f0\dbch\f0\loch\f0\fs20 \'b7 \plain\f1\fs20\dytd4345\hich\f1\dbch\f1\loch\f1\cf2\fs20\b Assessment\plain\f0\fs20\ggbs9619\hich\f0\dbch\f0\loch\f0\fs20\cell  \pard\intbl\ssparaaux0\s0\ql\plain\f0\fs24\plain\f0\fs20\jvrt5420\hich\f0\dbch\f0\loch\f0\fs20\cell  \intbl\row  \pard\ssparaaux0\s0\ql\plain\f0\fs24\plain\f0\fs20\oyyi6974\hich\f0\dbch\f0\loch\f0\fs20 Patient is a 89yo Female with past medical history of mild dementia, HTN, HLD, atrial fib on amiodarone, eliquis and metop, from [facility] with 24 HHA presented to the   ED with generalized constant abdominal pain since this morning with SOB and reported wheezing . \par  HHA stated the pain was severe this morning but much better now.. Patient had normal BM this morning. \par  Patient also has been wheezing according to HHA (no known COPD or asthma) and has non productive cough.   ? report of hypoxemia > placed on NC O2 \par  Patient at baseline mental status per son and HHA at bedside. No known sick contacts. Denied CP, SOB, vomiting, diarrhea, nausea, sick contacts, fever, syncope, palpitations\par  no fever or chills reported\par  on tele patient on sinus dasha in 40s while sleeping >> goes up to sinus dasha to 50s when woken up\par  \par  \plain\f1\fs20\vyfr9118\hich\f1\dbch\f1\loch\f1\cf2\fs20\strike\plain\f1\fs20\jcso5927\hich\f1\dbch\f1\loch\f1\cf2\fs20\plain\f0\fs20\fzqn4643\hich\f0\dbch\f0\loch\f0\fs20\par  \plain\f1\fs20\bayr6166\hich\f1\dbch\f1\loch\f1\cf2\fs20\b\ul{\field{\*\fldinst HYPERLINK [id number] }{\fldrslt Problem/Plan - 1:}}\plain\f0\fs20\hoqx8361\hich\f0\dbch\f0\loch\f0\fs20\ql\par  \'b7  {\*\bkmkstart mr59157947364}{\*\bkmkend jf40033795823}Problem: {\*\bkmkstart br36841606611}{\*\bkmkend qm52585323631}Acute respiratory failure with hypoxemia. \par  \'b7  {\*\bkmkstart bm55322481864}{\*\bkmkend xr49150108983}Plan: {\*\bkmkstart hb54553671098}{\*\bkmkend wv17557896722}unclear etiology\par  pro BNP not significantly elevated for age\par  no clear signs of pneumonia\par  will check Echo\par  nebs PRN\par  wean and monitor off O2 as able ( curretly on about 2 L NC) \par  OOB to chair as able\par  incentive spirometer \par  RVP negative\par  CT as noted with nonspecific GGO\par  supportive care\par  monitor.\par  \par  \plain\f1\fs20\jtfq7639\hich\f1\dbch\f1\loch\f1\cf2\fs20\b\ul{\field{\*\fldinst HYPERLINK [id number] }{\fldrslt Problem/Plan - 2:}}\plain\f0\fs20\zysr6295\hich\f0\dbch\f0\loch\f0\fs20\ql\par  \'b7  {\*\bkmkstart fw50911627949}{\*\bkmkend ww15591147467}Problem: {\*\bkmkstart yj06403824051}{\*\bkmkend ik19974434533}Sinus bradycardia. \par  \'b7  {\*\bkmkstart lb61334503630}{\*\bkmkend cn08981297915}Plan: {\*\bkmkstart iu15679501715}{\*\bkmkend lc90076691684}patient with history of AF on Amio and BB\par  will hold off BB as ow HR may be responsible for some of the symptoms\par  \ltrpar\ql\plain\f0\fs24\plain\f0\fs20\shsq9072\hich\f0\dbch\f0\loch\f0\fs20 if needed, will resume a smaller dose later \par  \pard\ssparaaux0\s0\ql\plain\f0\fs24\plain\f0\fs20\jqid8821\hich\f0\dbch\f0\loch\f0\fs20 monitor vitals closely\par  Echo.\par  \par  \plain\f1\fs20\flae3084\hich\f1\dbch\f1\loch\f1\cf2\fs20\b\ul{\field{\*\fldinst HYPERLINK [id number] }{\fldrslt Problem/Plan - 3:}}\plain\f0\fs20\qdxz9278\hich\f0\dbch\f0\loch\f0\fs20\ql\par  \'b7  {\*\bkmkstart jy45649636858}{\*\bkmkend ke65280924137}Problem: {\*\bkmkstart zm04458535210}{\*\bkmkend jr26397384888}Hypertension. \par  \'b7  {\*\bkmkstart zz15251555361}{\*\bkmkend cs12168496798}Plan: {\*\bkmkstart uy00511589397}{\*\bkmkend lf88099285356}will hold HCTZ given low sodium\par  continue Norvasc\par  hold BB as above\par  Echo\par  monitor.\par  \par  \plain\f1\fs20\fvqq4329\hich\f1\dbch\f1\loch\f1\cf2\fs20\b\ul{\field{\*\fldinst HYPERLINK [id number] }{\fldrslt Problem/Plan - 4:}}\plain\f0\fs20\edqg9531\hich\f0\dbch\f0\loch\f0\fs20\ql\par  \'b7  {\*\bkmkstart av15743629628}{\*\bkmkend zb14140360089}Problem: {\*\bkmkstart kw63758816651}{\*\bkmkend ja52044397001}Hyponatremia. \par  \'b7  {\*\bkmkstart tp54505734709}{\*\bkmkend ox87846664980}Plan: {\*\bkmkstart ri00234429128}{\*\bkmkend js84277316183}encourage PO intake\par  hold HCTZ for now\par  monitor as improved \par  \pard\plain\f0\fs24\plain\f0\fs20\tsyq8471\hich\f0\dbch\f0\loch\f0\fs20  Sodium:   136  <==, 134  <==, 133  <==, 133  <==\par  \ql\plain\f0\fs24\plain\f0\fs20\kewu9703\hich\f0\dbch\f0\loch\f0\fs20\par  \plain\f1\fs20\cueh9123\hich\f1\dbch\f1\loch\f1\cf2\fs20\b\ul{\field{\*\fldinst HYPERLINK [id number] }{\fldrslt Problem/Plan - 5:}}\plain\f0\fs20\jkjw5949\hich\f0\dbch\f0\loch\f0\fs20\ql\par  \'b7  {\*\bkmkstart rv45637321032}{\*\bkmkend df03194625342}Problem: {\*\bkmkstart xz21700908971}{\*\bkmkend yl45072041557}Chronic atrial fibrillation. \par  \'b7  {\*\bkmkstart ls15631020104}{\*\bkmkend bp67957841572}Plan: {\*\bkmkstart qy60014981914}{\*\bkmkend gs97198362040}as above\par  continue Amio to keep in sinus\par  continue Eliquis\par  holding BB for now \par  monitor \par  cardio evaluation as needed.\par  \par  \plain\f1\fs20\lgay8202\hich\f1\dbch\f1\loch\f1\cf2\fs20\b\ul{\field{\*\fldinst HYPERLINK [id number] }{\fldrslt Problem/Plan - 6:}}\plain\f0\fs20\yrte9650\hich\f0\dbch\f0\loch\f0\fs20\ql\par  \'b7  {\*\bkmkstart xu93141902432}{\*\bkmkend nu73803988383}Problem: {\*\bkmkstart yb50261428047}{\*\bkmkend eu40286653190}HLD (hyperlipidemia). \par  \'b7  {\*\bkmkstart nj85414010210}{\*\bkmkend dg67173563031}Plan: {\*\bkmkstart op29456959254}{\*\bkmkend pb94479718862}Continue home medications and monitor.\par  \par  \plain\f1\fs20\yykv2848\hich\f1\dbch\f1\loch\f1\cf2\fs20\b\ul{\field{\*\fldinst HYPERLINK [id number] }{\fldrslt Problem/Plan - 7:}}\plain\f0\fs20\datz3262\hich\f0\dbch\f0\loch\f0\fs20\ql\par  \'b7  {\*\bkmkstart yz53434691826}{\*\bkmkend yi63690335798}Problem: {\*\bkmkstart vf66054131637}{\*\bkmkend by60389518812}Dementia. \par  \'b7  {\*\bkmkstart is72161284068}{\*\bkmkend gp05464725746}Plan: {\*\bkmkstart io54431518678}{\*\bkmkend mv11244470385}Continue home medications and monitor.\par     hold if lethargic / sleepy.\par  \pard\plain\f0\fs24\plain\f0\fs20\nmjv4159\hich\f0\dbch\f0\loch\f0\fs20\par  -GI/DVT Prophylaxis per protocol.\par  \par  --------------------------------------------\par  Case discussed with patient, Nurse Practitioner\par  Education given on findings and plan of care\par  ___________________________\par  MIGUEL Kelly D.O.\par  Pager: [phone]\par  \par  \ql\plain\f0\fs24\plain\f0\fs20\rtxx4932\hich\f0\dbch\f0\loch\f0\fs20\par  }

## 2023-10-09 NOTE — DISCHARGE NOTE PROVIDER - HOSPITAL COURSE
Discharge Summary     Admit Date: 10-08-23  Discharge Date:    Admission diagnoses:   ***  Acute respiratory failure with hypoxia        Discharge diagnoses:   ***    Hospital Course:   For full details, please see H&P, progress notes, consult notes and ancillary notes. Briefly, CONNER CELESTE is a 89y Female with a history of ***. The patient's hospital course will be summarized in a problem based format.    # ***    # ***    On day of discharge, patient is clinically stable with no new exam findings or acute symptoms compared to prior. The patient was seen by the attending physician on the date of discharge and deemed stable and acceptable for discharge. The patient's chronic medical conditions were treated accordingly per the patient's home medication regimen. The patient's medication reconciliation (with changes made to chronic medications), follow up appointments, discharge orders, instructions, and significant lab and diagnostic studies are as noted.     Discharge follow up action items:     1. Follow up with PCP in 1-2 weeks.   2. Follow up labs, path, & imaging ***  3. Medication changes ***  4. On hold medications ***    Patient's ordered code status: ***    Patient disposition: ***     Discharge Summary     Admit Date: 10-08-23  Discharge Date:    Admission diagnoses:   ***  Acute respiratory failure with hypoxia  Hyponatremia  sinus bradycardia        Discharge diagnoses:     acute respiratory failure hypoxia- resolved  hyponatremia-resolved  sinus bradycardia- resolved    Hospital Course:   For full details, please see H&P, progress notes, consult notes and ancillary notes. Briefly, CONNER CELESTE is a 89y Female with a history of ***. The patient's hospital course will be summarized in a problem based format.  Acute respiratory failure with hypoxemia.   ·  Plan: unclear etiology  pro BNP not significantly elevated for age  no clear signs of pneumonia  will check Echo  nebs PRN  wean and monitor off O2 as able ( curretly on about 2 L NC)   OOB to chair as able  incentive spirometer   RVP negative  CT as noted with nonspecific GGO  supportive care  monitor.     Problem/Plan - 2:  ·  Problem: Sinus bradycardia.   ·  Plan: patient with history of AF on Amio and BB  will hold off BB as ow HR may be responsible for some of the symptoms  if needed, will resume a smaller dose later   monitor vitals closely  Echo.     Problem/Plan - 3:  ·  Problem: Hypertension.   ·  Plan: will hold HCTZ given low sodium  continue Norvasc  hold BB as above  Echo  monitor.     Problem/Plan - 4:  ·  Problem: Hyponatremia.   ·  Plan: encourage PO intake  hold HCTZ for now  monitor as improved    Sodium:   136  <==, 134  <==, 133  <==, 133  <==     Problem/Plan - 5:  ·  Problem: Chronic atrial fibrillation.   ·  Plan: as above  continue Amio to keep in sinus  continue Eliquis  holding BB for now   monitor   cardio evaluation as needed.     Problem/Plan - 6:  ·  Problem: HLD (hyperlipidemia).   ·  Plan: Continue home medications and monitor.     Problem/Plan - 7:  ·  Problem: Dementia.   ·  Plan: Continue home medications and monitor.     hold if lethargic / sleepy.          On day of discharge, patient is clinically stable with no new exam findings or acute symptoms compared to prior. The patient was seen by the attending physician on the date of discharge and deemed stable and acceptable for discharge. The patient's chronic medical conditions were treated accordingly per the patient's home medication regimen. The patient's medication reconciliation (with changes made to chronic medications), follow up appointments, discharge orders, instructions, and significant lab and diagnostic studies are as noted.     Discharge follow up action items:     1. Follow up with PCP in 1-2 days.   2. Follow up labs, path, & imaging ***repeat BMP outpatient   3. Medication changes ***below  4. On hold medications ***HCTZ, Metoprolol    Patient's ordered code status: ***DNR/DNI    Patient disposition: ***home with 24h HHA and son     Discharge Summary     Admit Date: 10-08-23  Discharge Date:    Admission diagnoses:   ***  Acute respiratory failure with hypoxia  Hyponatremia  sinus bradycardia        Discharge diagnoses:     acute respiratory failure hypoxia- resolved  hyponatremia-resolved  sinus bradycardia- resolved    Hospital Course:   For full details, please see H&P, progress notes, consult notes and ancillary notes. Briefly, CONNER CELESTE is a 89y Female with a history of ***. The patient's hospital course will be summarized in a problem based format.      On day of discharge, patient is clinically stable with no new exam findings or acute symptoms compared to prior. The patient was seen by the attending physician on the date of discharge and deemed stable and acceptable for discharge. The patient's chronic medical conditions were treated accordingly per the patient's home medication regimen. The patient's medication reconciliation (with changes made to chronic medications), follow up appointments, discharge orders, instructions, and significant lab and diagnostic studies are as noted.     Discharge follow up action items:     1. Follow up with PCP in 1-2 days.   2. Follow up labs, path, & imaging ***repeat BMP outpatient   3. Medication changes ***below  4. On hold medications ***HCTZ, Metoprolol    Patient's ordered code status: ***DNR/DNI    Patient disposition: ***home with 24h HHA and son

## 2023-10-19 NOTE — PHYSICAL THERAPY INITIAL EVALUATION ADULT - TRANSFER SKILLS, REHAB EVAL
independent Propranolol Pregnancy And Lactation Text: This medication is Pregnancy Category C and it isn't known if it is safe during pregnancy. It is excreted in breast milk.

## 2023-10-20 PROBLEM — I47.19 ATRIAL TACHYCARDIA: Status: ACTIVE | Noted: 2019-09-05

## 2023-10-20 PROBLEM — Z79.01 CURRENT USE OF LONG TERM ANTICOAGULATION: Status: ACTIVE | Noted: 2019-09-05

## 2023-10-20 PROBLEM — I47.10 SVT (SUPRAVENTRICULAR TACHYCARDIA): Status: ACTIVE | Noted: 2019-08-30

## 2023-11-05 NOTE — H&P ADULT - PROBLEM SELECTOR PLAN 5
cont home norvasc  recently discontinued off of hctz given hypona  recently was dc'd off toprol upon discharge from recent hospitalization but resumed on toprol by outpatient provider; hold for now given 1st degree avb and bradycardia

## 2023-11-05 NOTE — ED PROVIDER NOTE - CCCP TRG CHIEF CMPLNT
[facility]  [address] [address]  Dept: [phone]  Dept Fax: [phone]    Manford Denver (:  1972) is a 48 y.o. female,Established patient, here for evaluation of the following chief complaint(s):  Pancreatitis (Hosp F/U) and Muscle Pain (generalized body)    Manford Denver presents today for emergency room follow-up. 2022 at [facility] [place] ER for abdominal pain with nausea and vomiting. Patient endorses history of pancreatitis, CT abdomen negative for acute pancreatitis or other acute process, positive for mild hepatic steatosis. Treated for UTI with nitrofurantoin. History of intermittent nausea vomiting and right upper quadrant pain. ASSESSMENT/PLAN:  1. Non-intractable vomiting with nausea, unspecified vomiting type  -     NM HEPATOBILIARY SCAN W EJECTION FRACTION; Future  -     Sebastian Franco MD, Gastroenterology, [place]  -     ondansetron (ZOFRAN ODT) 4 MG disintegrating tablet; Take 1 tablet by mouth every 8 hours as needed for Nausea or Vomiting, Disp-20 tablet, R-2Normal  2. Encounter for screening mammogram for breast cancer  -     REID Digital Screen Bilateral; Future  3. Generalized abdominal pain  -     Sebastian Franco MD, Gastroenterology, [place]    CT scans negative for gallbladder stones. However, patient with intermittent nausea and vomiting with food triggers. HIDA scan ordered to evaluate for gallbladder output. Patient denies any previous evaluation with GI, referral placed. Due for colon cancer screening, no previous evaluation. We will have patient discuss options with GI as colonoscopy may be likely given symptoms. Patient due for mammogram, not completed after visit 2 years ago. New order placed. Zofran ordered to be used as needed for nausea and vomiting.   Encouraged patient to use clear liquids such as Gatorade or Pedialyte to avoid dehydration symptoms. Symptoms not consistent with gluten intolerance, no diarrhea or skin changes noted. No follow-ups on file. Subjective   SUBJECTIVE/OBJECTIVE:  Feels episodes are occurring more often, now occuring every 2-3 months. Lasts 2-3 days. N/V starts first followed by gen abd pain  Starts as food, then switches to bile. Denies diarrhea with episodes. Hx of THC use for the last 6 years, but has been ongoing for the last 30-40 years    No menstrual cycle in the last year, but still having episodes. Last emesis July 3rd, most recent episode started about 5 to 7 days before she presented to the ER on July 1. Denies bloody stools or bloody emesis. Has been ingesting soft, bland foods for the last week. Does feel that greasy or spicy foods seem to irritate her symptoms more. Feels as if her stomach hurts \"all over\". Denies sharp pains or cramping, describes pain as \"an allover bruise\". Review of Systems   Constitutional: Negative for appetite change, fever and unexpected weight change. HENT: Negative for hearing loss and trouble swallowing. Eyes: Negative for visual disturbance. Respiratory: Negative for shortness of breath and wheezing. Cardiovascular: Negative for chest pain and palpitations. Gastrointestinal: Positive for abdominal pain, nausea and vomiting. Negative for diarrhea. Endocrine: Negative for polydipsia and polyuria. Genitourinary: Negative for difficulty urinating, frequency, hematuria and vaginal bleeding. Musculoskeletal: Negative for myalgias and neck pain. Neurological: Positive for weakness. Negative for seizures, numbness and headaches. Psychiatric/Behavioral: Negative for suicidal ideas. The patient is not nervous/anxious.            Objective     DIAGNOSTIC FINDINGS:  CBC:  Lab Results   Component Value Date/Time    WBC 13.5 07/01/2022 01:41 AM    HGB 15.1 07/01/2022 01:41 AM     07/01/2022 01:41 AM       BMP:    Lab Results   Component Value Date/Time     07/02/2022 06:31 AM    K 3.7 07/02/2022 06:31 AM     07/02/2022 06:31 AM    CO2 23 07/02/2022 06:31 AM    BUN 5 07/02/2022 06:31 AM    CREATININE 0.51 07/02/2022 06:31 AM    GLUCOSE 91 07/02/2022 06:31 AM       HEMOGLOBIN A1C: No results found for: LABA1C    FASTING LIPID PANEL:  Lab Results   Component Value Date    CHOL 215 (H) 07/01/2022    HDL 52 07/01/2022    TRIG 58 07/02/2022       Physical Exam  Vitals and nursing note reviewed. Constitutional:       General: She is not in acute distress. Appearance: Normal appearance. She is well-developed and normal weight. She is not ill-appearing. HENT:      Head: Normocephalic. Mouth/Throat:      Mouth: Mucous membranes are moist.   Eyes:      General: No scleral icterus. Pupils: Pupils are equal, round, and reactive to light. Cardiovascular:      Rate and Rhythm: Normal rate and regular rhythm. Pulmonary:      Effort: Pulmonary effort is normal.      Breath sounds: Normal breath sounds. Abdominal:      General: There is no distension. Palpations: Abdomen is soft. There is no mass. Tenderness: There is no abdominal tenderness. Musculoskeletal:      Cervical back: Normal range of motion and neck supple. Skin:     General: Skin is warm and dry. Neurological:      Mental Status: She is alert and oriented to person, place, and time. Coordination: Coordination normal.   Psychiatric:         Behavior: Behavior normal. Behavior is cooperative. Thought Content: Thought content normal.         Judgment: Judgment normal.            An electronic signature was used to authenticate this note.     --Bess Mata, APRN - CNP fall

## 2023-11-05 NOTE — PROCEDURE NOTE - ADDITIONAL PROCEDURE DETAILS
16fr parmar catheter placed with return of clear yellow urine, CT cystogram to rule out bladder perforation.

## 2023-11-05 NOTE — ED ADULT NURSE NOTE - ED STAT RN HANDOFF DETAILS
Report given to receiving change of shift JEREMY PERES  patient is in no acute distress. Patient vital signs stable, plan of care explained.

## 2023-11-05 NOTE — ED ADULT NURSE NOTE - CHPI ED NUR SYMPTOMS NEG
no bleeding/no confusion/no fever/no loss of consciousness/no numbness/no tingling/no vomiting/no weakness

## 2023-11-05 NOTE — H&P ADULT - PROBLEM SELECTOR PLAN 1
imaging reviewed; "acute comminuted fracture of right superior and inferior pubic rami + right ischial tuberosity"  ortho consulted by er; "wbat...no acute orthopaedic surgical intervention at this time"  fall and fracture precautions  wbat over affected area  elevate and ice affected area  pain control + bowel regimen as needed   parmar inserted in er  vte ppx with home ac  pt/ot eval + sw/cm consult for disposition

## 2023-11-05 NOTE — H&P ADULT - PROBLEM SELECTOR PLAN 4
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

## 2023-11-05 NOTE — ED ADULT NURSE NOTE - OBJECTIVE STATEMENT
89yo F aaox2 h/o HTN HLD, afib on Eliquis, dementia , presents to ED from home, via EMS, as per AIDE at bedside helping with history, 5 days ago pt had an unwitnessed fall , hitting the head, is not clear why she never came to ED for evaluation, but the aide today called ems because pt c/o R hip pain, on examinations R hip and L knee  bruise, and hematoma on the head , also aide endorse pt cant walk since the fall, Pt denies CP, SOB, HA, vision changes, n/v/d, fevers chills, abdominal pain, weakness, dizziness, URI symptoms, Safety and comfort measures initiated- bed placed in lowest position and side rails raised. Pt oriented to call bell system.

## 2023-11-05 NOTE — CONSULT NOTE ADULT - ASSESSMENT
89yo F presenting after fall from home with pelvic fractures in the setting of a small hematoma adjacent to the bladder. Reviewnig the images and given the urine was clear yellow have low suspicion for perforation but hematoma could be secondary to fracture in pelvis vs bladder contusion.     Recommendations  - CT cystogram to evaluate for bladder perforation  - Continue parmar catheter pending cystogram  - If cystogram shows no evidence of perforation then catheter can likely be removed  - If cystogram shows contusion contact urology for catheter guidance  - if cystogram shows perforation then do not remove catheter  - Discussed with Dr. Minor 89yo F presenting after fall from home with pelvic fractures in the setting of a small hematoma adjacent to the bladder. Reviewing the images and given the urine was clear yellow have low suspicion for perforation but hematoma could be secondary to fracture in pelvis vs bladder contusion.     Recommendations  - CT cystogram to evaluate for bladder perforation  - Continue parmar catheter pending cystogram  - If cystogram shows no evidence of perforation then catheter can likely be removed  - If cystogram shows contusion contact urology for catheter guidance  - if cystogram shows perforation then do not remove catheter  - Discussed with Dr. Minor 89yo F presenting after fall from home with pelvic fractures in the setting of a small hematoma adjacent to the bladder. Reviewing the images, have low suspicion for perforation but hematoma could be secondary to fracture in pelvis vs bladder contusion.     Recommendations  - CT cystogram to evaluate for bladder perforation  - Continue parmar catheter pending cystogram  - If cystogram shows contusion contact urology for catheter guidance  - if cystogram shows perforation then do not remove catheter  - Discussed with Dr. Minor

## 2023-11-05 NOTE — CONSULT NOTE ADULT - ATTENDING COMMENTS
PT for ambulation training WBAT with a walker
She was seen and examined, CT cystogram reviewed. Urine in Goodman is light red now. No obvious bladder leak was noted. Keep Goodman to drainage until urine is clear.

## 2023-11-05 NOTE — H&P ADULT - PROBLEM SELECTOR PLAN 3
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

## 2023-11-05 NOTE — ED PROVIDER NOTE - OBJECTIVE STATEMENT
Patient is a 89yo Female with past medical history of mild dementia, HTN, HLD, atrial fib on amiodarone, eliquis and metop, from [facility] with 24 HHA since the emergency department via EMS with her home health for the evaluation of a fall.  She is unable to significantly contribute to the history.  Per the home health aide who is only with her on weekends the home health aide from the weekend noted that she had an unwitnessed fall on Wednesday.  Typically walks around with a walker.  Positive head strike.  Since then patient has been unable to walk.  Per the home health aide there was a discussion at that time with the son to determine if she would come in however the decision was made to see if she was feeling better over the next few days.  Patient denies any headache right now.  Per the home health aide she does have a chronic dry cough.

## 2023-11-05 NOTE — H&P ADULT - PROBLEM SELECTOR PLAN 2
imaging reviewed "small hematoma abuts right anterolateral aspect of bladder wall"  ua shows rbcs but no hematuria  suspect 2/2 trauma/pelvic fracture vs bladder contusion, r/o bladder perforation  trauma and urology consulted by er  uro recommending, "no evidence of perforation then catheter can likely be removed... If cystogram shows contusion contact urology for catheter guidance....if cystogram shows perforation then do not remove catheter"  cystogram w no evidence of bladder perforation  s/p parmar placement in er  hold home aspirin; continue home eliquis for now given no active gross bleeding, stable + baseline hgb, and hds otherwise iso chadsvasc ~6  urology follow up in am

## 2023-11-05 NOTE — ED PROVIDER NOTE - NSCAREINITIATED _GEN_ER
Patient Care Team:  Donis Shelton DO as PCP - General (Family Medicine)  Ilda Andrews MD as Consulting Physician (Cardiology)      History and Physical    Mr. Jaquan Musa is a pleasant 61 yo male with a history of CKD requiring HD and  Liver disease. He is on the kidney/liver transplant list at [facility]. He has a LUE brachial - axillary AV graft. He reports that approximately 2 months ago he hit his left index finger with a hammer and developed a \"blood blister\". This has progressed to a non healing wound and we have been asked to evaluate him for this. He reports pain in his left index finger. He has an upcoming appointment schedule with [address] [place] [address],[address]. He dialyzes M-W-F at [facility]. He denies any fever/chills. Janice Dumont is a 62 y.o. male with the following history reviewed and recorded in [facility]:  Patient Active Problem List    Diagnosis Date Noted    Open wnd of finger 01/22/2018    Chronic deep vein thrombosis (DVT) of axillary vein of left upper extremity ([address] 01/20/2017    Superficial venous thrombosis of left upper extremity 12/16/2016    CKD (chronic kidney disease) stage V requiring chronic dialysis ([address]     Osteoarthritis     CHF (congestive heart failure) ([address]     CAD (coronary artery disease)     HTN (hypertension)     Liver disease      cirrosis, hep c       Current Outpatient Prescriptions   Medication Sig Dispense Refill    oxyCODONE-acetaminophen (PERCOCET)  MG per tablet Take 1 tablet by mouth every 4 hours as needed for Pain.       clindamycin (CLEOCIN) 300 MG capsule Take 300 mg by mouth every 8 hours      lactulose (CEPHULAC) 10 G packet Take 10 g by mouth 3 times daily Indications: Disorder of the Liver Enzymes       minoxidil (LONITEN) 10 MG tablet Take 10 mg by mouth daily Indications: High Blood Pressure       cloNIDine (CATAPRES) 0.2 MG tablet Take 0.3 mg by mouth 3 times daily Indications: High Blood Pressure       lisinopril (PRINIVIL;ZESTRIL) 20 MG Rina Cardoso(Attending)

## 2023-11-05 NOTE — ED ADULT TRIAGE NOTE - CHIEF COMPLAINT QUOTE
fall on wednesday, unwitnessed, + head injury and hematoma to head, on eliquis, unable to walk due to left hip pain since wednesday

## 2023-11-05 NOTE — H&P ADULT - HISTORY OF PRESENT ILLNESS
89yo 60kg f w pmh mci/dementia, HTN, HLD, pafib, constipation, gerd, p/w inability to ambulate following recent fall; unwitnessed, with head strike, while on antiplt and ac, no associated loc. patient is a poor historian but reportedly, according to hha, patient had fallen ~4 days ago. since then, patient has been unable to walk. at baseline, she walks with walker. family grew concerned so had patient brought ot [facility] er for further evaluation.    in er, found to have right sup+inf pubic rami and right ischial tuberosity fractures a/w small hematoma over right anterolateral bladder wall; admit to medicine for further mgmt

## 2023-11-05 NOTE — ED ADULT NURSE NOTE - IS THE PATIENT ABLE TO BE SCREENED?
Discharge Planning Assessment  [facility]     Patient Name: Reece Stephen  MRN: [id number]  Today's Date: 6/6/2022    Admit Date: 6/6/2022     Discharge Needs Assessment     Row Name 06/06/22 1640       Living Environment    People in Home facility resident    Name(s) of People in Home lives in [facility] assisted living    Current Living Arrangements assisted living facility    Primary Care Provided by --  facility staff    Provides Primary Care For no one, unable/limited ability to care for self    Quality of Family Relationships involved;supportive    Able to Return to Prior Arrangements no       Transition Planning    Patient/Family Anticipates Transition to inpatient rehabilitation facility    Patient/Family Anticipated Services at Transition ;skilled nursing    Transportation Anticipated health plan transportation       Discharge Needs Assessment    Current Outpatient/Agency/Support Group assisted living facility    Equipment Currently Used at Home walker, rolling;colostomy/ostomy supplies    Concerns to be Addressed discharge planning;decision-making    Anticipated Changes Related to Illness inability to care for self    Outpatient/Agency/Support Group Needs skilled nursing facility;inpatient rehabilitation facility    Discharge Facility/Level of Care Needs rehabilitation facility;nursing facility, skilled    Current Discharge Risk dependent with mobility/activities of daily living;chronically ill;physical impairment               Discharge Plan     Row Name 06/06/22 1605       Plan    Plan Comments Entered room, introduced self and explained role w/PPE in place on self, patient and niece at bedside; received permission to speak in front of nijovita Stephen (POA, Healthcare Surrogate); [facility]. Patient had a fall w/ brain hemorrhage about 2 weeks ago- niece reports patient has been on a gradual decline since w/increased trouble  "hearing, shuffled gait, slurred speech, and possible \"blacking out\". The niece feels as though the patient may need to be in a higher level of care at this point and is interested in [facility] or [facility]. In addition, patient has VA benefits and prefers to stay in this hospital if admitted. Patient has a colostomy and a rolling walker- has had assistance w/ADL's- however, patient has been able to change his own colostomy and take own meds until now. Please contact Geetha Marindle for further d/c planning.              Continued Care and Services - Admitted Since 6/6/2022    Coordination has not been started for this encounter.     Selected Continued Care - Prior Encounters Includes selections from prior encounters from 3/8/2022 to 6/6/2022    Discharged on 5/26/2022 Admission date: 5/18/2022 - Discharge disposition: Home or Self Care    Destination     Service Provider Selected Services Address Phone Fax Patient Preferred    [facility] [address] [place] [id number] [phone] --                       Demographic Summary     Row Name 06/06/22 1631       General Information    Admission Type observation    Arrived From home    Referral Source nursing    Reason for Consult decision-making;discharge planning    Preferred Language English       Contact Information    Permission Granted to Share Info With ;facility ;family/designee  Brayan Stephen               Functional Status     Row Name 06/06/22 1639       Functional Status    Usual Activity Tolerance moderate    Current Activity Tolerance fair       Functional Status, IADL    Medications assistive person    Meal Preparation assistive person    Housekeeping assistive person    Laundry assistive person       Mental Status    General Appearance WDL WDL       Mental Status Summary    Recent Changes in Mental Status/Cognitive Functioning ability to speak clearly    Mental Status Comments " hearing is worsening, speech slurring more per niece       Employment/    Employment Status , previous service;retired            [employer]               Psychosocial    No documentation.                Abuse/Neglect    No documentation.                Legal    No documentation.                Substance Abuse    No documentation.                Patient Forms    No documentation.                   Lianne Christianson RN     No

## 2023-11-05 NOTE — H&P ADULT - NSHPPHYSICALEXAM_GEN_ALL_CORE
T(C): 36.8 (11-05-23 @ 19:25), Max: 36.8 (11-05-23 @ 11:12)  HR: 60 (11-05-23 @ 19:25) (60 - 66)  BP: 119/73 (11-05-23 @ 19:25) (104/73 - 143/82)  RR: 20 (11-05-23 @ 19:25) (17 - 20)  SpO2: 97% (11-05-23 @ 19:25) (91% - 98%)  GENERAL: NAD, lying in bed   EYES: EOMI, PERRLA; conjunctiva and sclera clear  ENMT: Moist oral mucosa, no pharyngeal injection or exudates   NECK: Supple, no palpable masses; no JVD  RESPIRATORY: Normal respiratory effort; lungs are clear to auscultation bilaterally  CARDIOVASCULAR: Regular rate and rhythm, normal S1 and S2, no murmur/rub/gallop; No lower extremity edema; Peripheral pulses are 2+ bilaterally  ABDOMEN: Nontender to palpation, normoactive bowel sounds, no rebound/guarding; parmar placed in er  MUSCULOSKELETAL: decreased rom over right hip  PSYCH: A+Ox1; affect appropriate  NEUROLOGY: CN 2-12 are intact and symmetric; no gross motor or sensory deficits   SKIN: No rashes; no palpable lesions

## 2023-11-05 NOTE — CONSULT NOTE ADULT - ASSESSMENT
ASSESSMENT: 89y/o F w/ PMHx dementia, HTN, HLD, Afib on Eliquis who presents from home s/p fall 4 days prior, + HS, no LOC. Patient was unable to walk after the fall so was brought to the ED. In the ED, patient is afebrile, VSS. WBC 9.28, H/H 10.7/33.6. Lactate 3.1, improved to 1.5 s/p 1L bolus. CT w/ findings of acute fractures of R superior pubic ramus, R inferior pubic ramus, R ischium/ischial tuberosity, R sacral ala age-indeterminate nondisplaced fracture. Also, small hematoma abutting R anterolateral bladder wall. UA + for RBCs.      PLAN:   - Goodman placed by urology - appreciate recs  - F/u CT cystogram to r/o bladder injury  - If CT cystogram negative, likely an old hematoma s/p fractures 4 days prior  - Dispo pending CT cystogram      Patient discussed with trauma attending, Dr. Tai.    Elizabeth Michaels, PGY-2  ACS/Trauma  [phone]

## 2023-11-05 NOTE — H&P ADULT - NSHPADDITIONALINFOADULT_GEN_ALL_CORE
dnr/dni on prior admission; clarify code status in am  regular diet  vte ppx w home ac  bed rest for now, advance activity as tolerated

## 2023-11-05 NOTE — ED ADULT NURSE REASSESSMENT NOTE - NS ED NURSE REASSESS COMMENT FT1
urologist MD at bedside placing Goodman catheter while using sterile technique as per MD order, urine draining clear and yellow.
Pt was provided a sandwich to eat, pt is eating with  at bedside.

## 2023-11-05 NOTE — ED PROVIDER NOTE - CLINICAL SUMMARY MEDICAL DECISION MAKING FREE TEXT BOX
Walker WHITING: Patient is a 89-year-old female with a history of mild dementia, hypertension, hyperlipidemia, atrial fibrillation BIBEMS for evaluation of fall.  HHA is at bedside providing history.  Accordingly, patient had a fall on Wednesday.  Since then, she has not been walking.  Patient's son is aware.  According to HHA, they thought that patient would get better with time.  At baseline, patient walks with a walker.  Patient is unable to provide any history.  She denies any pain.  Per HHA, no recent fevers, chills, cough.  Of note, patient is on Eliquis for her atrial fibrillation.  Additional medications include amiodarone and metoprolol.  On exam, patient has a hematoma to her right occiput.  She also has decreased range of motion of the right hip with ecchymosis to the right lateral hip.  She has tenderness to palpation.  Pelvis is otherwise stable.  Otherwise, NAD, RRR, lungs CTA bilaterally, abdomen is soft, nontender, nondistended.  Differential includes ICH, hip/pelvic fracture.  Plan for labs, CT head, CT C-spine, XRAY pelvis, CT pelvis. Patient will need to be admitted. See progress notes for updates.

## 2023-11-05 NOTE — CONSULT NOTE ADULT - SUBJECTIVE AND OBJECTIVE BOX
TRAUMA SERVICE (Acute Care Surgery / ACS - #[id number]) - CONSULT NOTE  --------------------------------------------------------------------------------------------    TRAUMA ACTIVATION LEVEL: consult    MECHANISM OF INJURY:      [x] Blunt  	[] MVC	[x] Fall	[] Pedestrian Struck	[] Motorcycle accident      [] Penetrating  	[] Gun Shot Wound 		[] Stab Wound    GCS: 	E: 4	V: 5	M: 6      HPI:   89y/o F w/ PMHx dementia, HTN, HLD, Afib on Eliquis who presents from home s/p fall 4 days prior. Patient's son is at bedside. Reports that patient fell from standing when turning to look at home aide 4 days ago, + HS, no LOC. Patient seemed to be ok after the fall; however, was unable to walk since the fall so was brought to the ED. Patient denies headache, abdominal pain, fevers, chills. Endorses R hip pain.    In the ED, patient is afebrile, VSS. WBC 9.28, H/H 10.7/33.6. Lactate 3.1, improved to 1.5 s/p 1L bolus. CT w/ findings of acute fractures of R superior pubic ramus, R inferior pubic ramus, R ischium/ischial tuberosity, R sacral ala age-indeterminate nondisplaced fracture. Also, small hematoma abutting R anterolateral bladder wall. UA + for RBCs.    Primary Survey:   A - airway intact  B - bilateral breath sounds and good chest rise  C - initial BP: 104/73 (23 @ 15:07) , HR: 66 (23 @ 15:07) , palpable pulses in all extremities  D - GCS 15 on arrival  Exposure obtained      Secondary Survey:  General: NAD  HEENT: Normocephalic, atraumatic, EOMI, PEERLA  Neck: Soft, midline trachea  Chest: No chest wall tenderness   Cardiac: S1, S2, RRR  Respiratory: Bilateral breath sounds, clear and equal bilaterally  Abdomen: Soft, non-distended, non-tender, no rebound, no guarding, no masses palpated  Pelvis: Stable, R sided tenderness  Ext: palp radial b/l UE, b/l DP palp in Lower Extrem, motor and sensory grossly intact except R hip ROM limited by pain  Back: no TTP, no palpable runoff/stepoff/deformity      ROS: 10-system review is otherwise negative except HPI above.      PAST MEDICAL & SURGICAL HISTORY:  Hypertension      Hyperlipidemia      Delusional disorder      Cataracts, bilateral      Atrial tachycardia      H/O fracture of patella      S/P breast biopsy      H/O cataract extraction      H/O fracture of patella      H/O fracture of patella        FAMILY HISTORY:  Family history of hypertension (Uncle)    Family history of acute myocardial infarction (Uncle)      [] Family history not pertinent as reviewed with the patient and family    SOCIAL HISTORY:     ALLERGIES: Cipro (Unknown)  latex (Urticaria)  penicillin (Unknown)      HOME MEDICATIONS:     CURRENT MEDICATIONS  MEDICATIONS (STANDING):   MEDICATIONS (PRN):  --------------------------------------------------------------------------------------------    Vitals:   T(C): 36.8 (23 @ 11:12), Max: 36.8 (23 @ 11:12)  HR: 66 (23 @ 15:07) (63 - 66)  BP: 104/73 (23 @ 15:07) (104/73 - 143/82)  RR: 18 (23 @ 15:07) (17 - 18)  SpO2: 95% (23 @ 15:07) (91% - 98%)  CAPILLARY BLOOD GLUCOSE        CAPILLARY BLOOD GLUCOSE            Weight (kg): 59 ( @ 10:42)  --------------------------------------------------------------------------------------------    LABS  CBC ( 11:50)                              10.7<L>                         9.28    )----------------(  176        76.4  % Neutrophils, 10.8<L>% Lymphocytes, ANC: 7.10                                33.6<L>    BMP ( 11:50)             133<L>  |  96      |  26<H> 		Ca++ --      Ca 9.2                ---------------------------------( 140<H>		Mg --                 4.3     |  20<L>   |  0.91  			Ph --        LFTs ( @ 11:50)      TPro 7.8 / Alb 3.6 / TBili 1.1 / DBili -- / AST 61<H> / ALT 44 / AlkPhos 136<H>    Coags ( @ 11:50)  aPTT 31.5 / INR 1.38<H> / PT 15.0<H>        VBG ( @ 15:00)     7.40 / 42 / 46<H> / 26 / 1.0 / 74.6%     Lactate: 1.5  VBG ( @ 11:50)     7.39 / 45<H> / 46<H> / 27 / 1.8 / 68.3%     Lactate: 3.1<H>    --------------------------------------------------------------------------------------------    MICROBIOLOGY  Urinalysis ( @ 15:53):     Color: Yellow / Appearance: Turbid<!> / S.017 / pH: 7.0 / Gluc: Negative / Ketones: Negative / Bili: Negative / Urobili: 1.0 / Protein :Trace / Nitrites: Negative / Leuk.Est: Moderate<!> / RBC: 47<H> / WBC: 2 / Sq Epi:  / Non Sq Epi: 2 / Bacteria Many<!>         --------------------------------------------------------------------------------------------    IMAGING  < from: CT Pelvis Bony Only No Cont (05.23 @ 12:25) >  FINDINGS:    Acute comminuted mildly displaced fracture of the right superior pubic   ramus. Acute nondisplaced fracture of the right inferior pubic ramus.   Acute minimally displaced fracture of the right posterior ischium/ischial   tuberosity.    Mild cortical irregularity along the right sacral ala, which may   represent an age-indeterminate nondisplaced fracture.    Mild bilateral hip arthrosis. Degenerative changes within the lower   lumbar spine, sacroiliac joints, pubic symphysis.    Small hematoma surrounding the fracture sites. This hematoma abuts the   right anterolateral aspect of the bladder wall.    Mild presacral soft tissue swelling.    Colonic diverticulosis. Partially evaluated cholelithiasis.   Fat-containing left inguinal hernia. Small fat-containing umbilical   hernia. Atherosclerotic calcifications are noted.    IMPRESSION:    Acute fractures of the right superior pubic ramus, right inferior pubic   ramus, and right ischium/ischial tuberosity.    Cortical irregularity along the anterior aspect of the right sacral ala,   which may represent an age-indeterminate nondisplaced fracture.    Small hematoma which abuts the right anterolateral aspect of the bladder   wall. Recommend correlation with urinalysis to evaluate for hematuria. If   hematuria is present, recommend further evaluation with CT cystogram to   rule out bladder injury.    Other findings as above.    < end of copied text >      --------------------------------------------------------------------------------------------
89y Female presents to ED s/p [facility] fall c/o R hip pain and inability to ambulate. Patient fell on wednesday, patient had signficant difficulty ambulating and when didnt improve in a few days, family brought her into the ED. Son at beside providing hisotry. +HS, no LOC. Localizes pain to R hip/femur. Patient denies radiation of pain. Patient denies numbness/tingling/burning in the RLE. No other bone/joint complaints. Patient is a home ambulator at baseline with walker. Anticoagulation Eliquis    PAST MEDICAL & SURGICAL HISTORY:  Hypertension      Hyperlipidemia      Delusional disorder      Cataracts, bilateral      Atrial tachycardia      H/O fracture of patella      S/P breast biopsy      H/O cataract extraction      H/O fracture of patella      H/O fracture of patella        MEDICATIONS  (STANDING):    MEDICATIONS  (PRN):    Allergies    Cipro (Unknown)  latex (Urticaria)  penicillin (Unknown)    Intolerances        T(C): 36.8 (11-05-23 @ 19:25), Max: 36.8 (11-05-23 @ 11:12)  HR: 60 (11-05-23 @ 19:25) (60 - 66)  BP: 119/73 (11-05-23 @ 19:25) (104/73 - 143/82)  RR: 20 (11-05-23 @ 19:25) (17 - 20)  SpO2: 97% (11-05-23 @ 19:25) (91% - 98%)  Wt(kg): --    PE   RLE:  Skin intact; No ecchymosis/soft tissue swelling  Compartments soft; + TTP about hip. No TTP to knee/leg/ankle/foot   ROM lmited 2/2 pain   Unable to SLR; - Log Roll/Heel Strike  Motor intact GS/TA/FHL/EHL  SILT L2-S1  DP pulses +    Secondary Survey:   No TTP over bony prominences, SILT, palpable pulses, full/painless A/PROM, compartments soft. No TTP over spinous processes or paraspinal muscles at C/T/L spine. No palpable step off. No other injuries or complaints.    Imaging:  XR CT demonstrating R Sup/inf pubic rami fx    89y Female with R Sup/inf pubic rami fx  - Pain control  - WBAT  - Analgesia  - PT/OT  - No acute orthopaedic surgical intervention indicated at this time. This patient is orthopaedically stable for discharge.   - Patient to follow up with Dr. Burton as an outpatient for further evaluation and management.   - All of the patient's questions and concerns were answered and addressed.  
HPI  Patient is a 89yo Female with past medical history of mild dementia, HTN, HLD, atrial fib on amiodarone, eliquis and metop, from [facility] with 24 HHA since the emergency department via EMS with her home health for the evaluation of a fall.  She is unable to significantly contribute to the history.  Per the home health aide who is only with her on weekends the home health aide from the weekend noted that she had an unwitnessed fall on Wednesday.  Typically walks around with a walker.  Positive head strike.  Since then patient has been unable to walk.  Per the home health aide there was a discussion at that time with the son to determine if she would come in however the decision was made to see if she was feeling better over the next few days.  Patient denies any headache right now.  Per the home health aide she does have a chronic dry cough.    Patient is a 89yo F presenting 3 days after a fall at home. On admission to the ED she had a CT scan which showed pelvic fractures (pubic rami fractures and ischial tuberosity) along with a small hematoma on right anterolateral aspect of the bladder wall for which urology was consulted. In speaking to the family and patient she has not had any urinary issues since the fall including frequency, urgency, dysuria, and hematuria. She denies any bilateral flank pain. In the ED she is hemodynamically stable and labs notable for a WBC of 9.2, H/H of 10.7/33.6, and a Cr of .91. UA is notable for 47 RBC and 2 WBC. A catheter was placed in the ED with return of clear yellow urine.    PAST MEDICAL & SURGICAL HISTORY:  Hypertension      Hyperlipidemia      Delusional disorder      Cataracts, bilateral      Atrial tachycardia      H/O fracture of patella      S/P breast biopsy      H/O cataract extraction      H/O fracture of patella      H/O fracture of patella          MEDICATIONS  (STANDING):    MEDICATIONS  (PRN):      FAMILY HISTORY:  Family history of hypertension (Uncle)    Family history of acute myocardial infarction (Uncle)        Allergies    Cipro (Unknown)  latex (Urticaria)  penicillin (Unknown)    Intolerances        SOCIAL HISTORY:    REVIEW OF SYSTEMS: Otherwise negative as stated in HPI    Physical Exam  Vital signs  T(C): 36.8 (23 @ 11:12), Max: 36.8 (23 @ 11:12)  HR: 66 (23 @ 15:07)  BP: 104/73 (23 @ 15:07)  SpO2: 95% (23 @ 15:07)  Wt(kg): --    Output      Gen:  NAD    Pulm:  No respiratory distress  	  CV:  RRR    GI:  S/ND/NT    :  parmar catheter in place with clear yellow urine, no suprapubic tenderness    MSK:      LABS:       @ 11:50    WBC 9.28  / Hct 33.6  / SCr 0.91         133<L>  |  96  |  26<H>  ----------------------------<  140<H>  4.3   |  20<L>  |  0.91    Ca    9.2      2023 11:50    TPro  7.8  /  Alb  3.6  /  TBili  1.1  /  DBili  x   /  AST  61<H>  /  ALT  44  /  AlkPhos  136<H>      PT/INR - ( 2023 11:50 )   PT: 15.0 sec;   INR: 1.38 ratio         PTT - ( 2023 11:50 )  PTT:31.5 sec  Urinalysis Basic - ( 2023 15:53 )    Color: Yellow / Appearance: Turbid / S.017 / pH: x  Gluc: x / Ketone: Negative mg/dL  / Bili: Negative / Urobili: 1.0 mg/dL   Blood: x / Protein: Trace mg/dL / Nitrite: Negative   Leuk Esterase: Moderate / RBC: 47 /HPF / WBC 2 /HPF   Sq Epi: x / Non Sq Epi: 2 /HPF / Bacteria: Many /HPF        Urine Cx:   Blood Cx:    RADIOLOGY:  ACC: [id number] EXAM:  CT 3D RECONSTRUCT W [place]   ORDERED BY:  TAWANA DURAN     ACC: [id number] EXAM:  CT PELVIS BONY ONLY   ORDERED BY:  TAWANA DURAN     PROCEDURE DATE:  2023          INTERPRETATION:  CLINICAL INFORMATION: Evaluation status post fall    TECHNIQUE: Noncontrast axial CT of the pelvis with sagittal and coronal   reformatted images. 3-D reconstruction was performed.    COMPARISON: CT abdomen and pelvis 10/8/2023    FINDINGS:    Acute comminuted mildly displaced fracture of the right superior pubic   ramus. Acute nondisplaced fracture of the right inferior pubic ramus.   Acute minimally displaced fracture of the right posterior ischium/ischial   tuberosity.    Mild cortical irregularity along the right sacral ala, which may   represent an age-indeterminate nondisplaced fracture.    Mild bilateral hip arthrosis. Degenerative changes within the lower   lumbar spine, sacroiliac joints, pubic symphysis.    Small hematoma surrounding the fracture sites. This hematoma abuts the   right anterolateral aspect of the bladder wall.    Mild presacral soft tissue swelling.    Colonic diverticulosis. Partially evaluated cholelithiasis.   Fat-containing left inguinal hernia. Small fat-containing umbilical   hernia. Atherosclerotic calcifications are noted.    IMPRESSION:    Acute fractures of the right superior pubic ramus, right inferior pubic   ramus, and right ischium/ischial tuberosity.    Cortical irregularity along the anterior aspect of the right sacral ala,   which may represent an age-indeterminate nondisplaced fracture.    Small hematoma which abuts the right anterolateral aspect of the bladder   wall. Recommend correlation with urinalysis to evaluate for hematuria. If   hematuria is present, recommend further evaluation with CT cystogram to   rule out bladder injury.    Other findings as above.    --- End of Report ---           MISTY WAITE MD; Resident Radiologist  This document has been electronically signed.  HESHAM BEST M.D., ATTENDING RADIOLOGIST  This document has been electronically signed. 2023  1:53PM

## 2023-11-05 NOTE — ED PROVIDER NOTE - PROGRESS NOTE DETAILS
Aiyana Ko MD, PGY2: 89 y/o dementia, son decision maker, unwitnessed mechanical fall on Wednesday, has been unable to ambulate since, found to have pubic rami and sacral alar fx w/ hematoma abutting the bladder. trauma aware, if bladder is involved trauma will continue to manage.   pt signed out to me pending CT cystogram read and ortho eval and admission. Aiyana Ko MD, PGY2:  ortho at bedside. recommends nonop management and WBAT. CT cystogram shows no bladder perforation. discussed plan with son at bedside, pt to be admitted to medicine. Aiyana Ko MD, PGY2: trauma states nothing to do. pt accepted for admission

## 2023-11-05 NOTE — ED ADULT NURSE NOTE - NSFALLHARMRISKINTERV_ED_ALL_ED

## 2023-11-05 NOTE — ED PROVIDER NOTE - ATTENDING APP SHARED VISIT CONTRIBUTION OF CARE
I performed a history and physical exam of the patient and discussed their management with the Advanced Care Practitioner. I reviewed the ACP's note and agree with the documented findings and plan of care.    I performed a history and physical exam of the patient and discussed their management with the resident. I reviewed the resident's note and agree with the documented findings and plan of care.

## 2023-11-05 NOTE — H&P ADULT - ASSESSMENT
lorenza is a 89yo Female with past medical history of mild dementia, HTN, HLD, atrial fib on amiodarone, eliquis and metop, from [facility] with 24 HHA presented to the ED with generalized constant abdominal pain since this morning with SOB and reported wheezing .   HHA stated the pain was severe this morning but much better now.. Patient had normal BM this morning.   Patient also has been wheezing according to HHA (no known COPD or asthma) and has non productive cough.   ? report of hypoxemia > placed on NC O2   Patient at baseline mental status per son and HHA at bedside. No known sick contacts. Denied CP, SOB, vomiting, diarrhea, nausea, sick contacts, fever, syncope, palpitations  no fever or chills reported  on tele patient on sinus dasha in 40s while sleeping >> goes up to sinus dasha to 50s when woken up    frac  likely mechanical in nature; known h/o falls  imaging reviewed; "nondisplaced right fibular fracture...severe osteoarthrosis of right knee w large knee joint effusion"  ortho consulted by er; "no acute orthopaedic surgical intervention at this time...WBAT RLE"   fall and fracture precautions  nwb over affected area  elevated and ice affected area  pain control + bowel regimen as needed   frequent bladder scan  vte ppx with subq lovenox  pt eval + sw/cm consult for disposition    fall  h/o dementia ([place], s/p bilat cataract surg), cva (l thalamic infarct), oa  wheelchair bound/bed bound, a+o x1-2 at baseline  trauma work up with no acute significant findings  neuroimaging shows chronic microvascular ischemic changes   ekg with no new st seg - t wave changes suggestive of acute ischemia   likely mechanical in nature (multiple er visits in the past few years for metabolic derangements consistent w hypovolemia/dehydration iso falls and presyncopal/syncopal episodes)  obtain tsh, folate, b12, rpr; orthostatic vitals, tte, carotid duplex; ua +/- uc, rvp + cxr  Monitor mental status with frequent neurochecks  monitor on telemetry  maintain seizure, aspiration precautions; keep head end of bed elevated  delirium precautions (eg Minimize invasive lines + devices; avoid restraints; correct pre existing sensory deficits ie hearing aids, glasses; maintain adequate hydration; promote normal circadian rhythm ie adequate light and noise control; prevent environmental isolation ie music therapy, increase family interaction)  nutrition consult  dysphagia screen/slp eval  pt/ot eval + sw/cm consult for disposition  goc and advanced directives conversation           Problem/Plan - 1:  ·  Problem: Acute respiratory failure with hypoxemia.   ·  Plan: unclear etiology  pro BNP not significantly elevated for age  no clear signs of pneumonia  will check Echo  nebs PRN  wean off O2 as able  OOB to chair as able  RVP negative  CT as noted with nonspecific GGO  supportive care  monitor.     Problem/Plan - 2:  ·  Problem: Sinus bradycardia.   ·  Plan: patient with history of AF on Amio and BB  will hold off BB as ow HR may be responsible for some of the symptoms  if needed, will resume a half dose  monitor vitals closely  Echo.     Problem/Plan - 3:  ·  Problem: Hypertension.   ·  Plan: will hold HCTZ given low sodium  continue Norvasc  hold BB as above  Echo  monitor.     Problem/Plan - 4:  ·  Problem: Hyponatremia.   ·  Plan: encourage PO intake  hold HCTZ for now  monitor.     Problem/Plan - 5:  ·  Problem: Chronic atrial fibrillation.   ·  Plan: as above  continue Amio to keep in sinus  continue Eliquis  holding BB for now   monitor   cardio evaluation as needed.     Problem/Plan - 6:  ·  Problem: HLD (hyperlipidemia).   ·  Plan: Continue home medications and monitor.     Problem/Plan - 7:  ·  Problem: Dementia.   ·  Plan: Continue home medications and monitor.     hold if lethargic / sleepy.   89yo 60kg f w pmh mci/dementia, HTN, HLD, pafib, constipation, gerd, p/w inability to ambulate following recent fall; in er, found to have right sup+inf pubic rami and right ischial tuberosity fractures a/w small hematoma over right anterolateral bladder wall; admit to medicine for further mgmt

## 2023-11-06 NOTE — PHYSICAL THERAPY INITIAL EVALUATION ADULT - DIAGNOSIS, PT EVAL
Problem: Adult Inpatient Plan of Care  Goal: Plan of Care Review  Outcome: Ongoing, Progressing  Goal: Patient-Specific Goal (Individualized)  Outcome: Ongoing, Progressing  Goal: Absence of Hospital-Acquired Illness or Injury  Outcome: Ongoing, Progressing  Goal: Optimal Comfort and Wellbeing  Outcome: Ongoing, Progressing  Goal: Readiness for Transition of Care  Outcome: Ongoing, Progressing     Problem: Fall Injury Risk  Goal: Absence of Fall and Fall-Related Injury  Outcome: Ongoing, Progressing     Problem: Violence Risk or Actual  Goal: Anger and Impulse Control  Outcome: Ongoing, Progressing      Pt presents with pain, decreased strength, decreased balance, and decreased endurance limiting overall independence with mobility.

## 2023-11-06 NOTE — PHYSICAL THERAPY INITIAL EVALUATION ADULT - PERTINENT HX OF CURRENT PROBLEM, REHAB EVAL
pt is a 89yo female with pmh mci/dementia, HTN, HLD, pafib, constipation, gerd, p/w inability to ambulate following recent fall; unwitnessed, with head strike, while on antiplt and ac, no associated loc. patient is a poor historian but reportedly, according to hha, patient had fallen ~4 days ago. since then, patient has been unable to walk. At baseline, she walks with walker.   Xray Pelvis/Hip: Acute mildly displaced fracture of the right superior and inferior pubic rami.   CXR: Clear lungs.  CT HEAD: No evidence of acute calvarial fracture or intracranial hemorrhage. Right parietal scalp hematoma. 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 Additional findings described in detail above.  CT CERVICAL SPINE: No definitive evidence of prevertebral soft tissue swelling or acute fracture. Straightening of lordosis may reflect muscle spasm. Grade 1 anterolisthesis C4 over C5 and C7 over T1. Additional findings, including those degenerative, described in detail above. Partially imaged bilateral upper lobe ground glass opacities.

## 2023-11-06 NOTE — PHYSICAL THERAPY INITIAL EVALUATION ADULT - IMPAIRMENTS FOUND, PT EVAL
aerobic capacity/endurance/cognitive impairment/gait, locomotion, and balance/poor safety awareness/posture

## 2023-11-06 NOTE — PROGRESS NOTE ADULT - SUBJECTIVE AND OBJECTIVE BOX
TRAUMA SERVICE TERTIARY EXAM    Date of TTS:      23                        Time: 720  Admit Date:       23                       Trauma Activation: consult  Admit GCS: E- 4    V- 5    M- 6    HPI:  89y/o F w/ PMHx dementia, HTN, HLD, Afib on Eliquis who presents from home s/p fall 4 days prior. Patient's son is at bedside. Reports that patient fell from standing when turning to look at home aide 4 days ago, + HS, no LOC. Patient seemed to be ok after the fall; however, was unable to walk since the fall so was brought to the ED. Patient denies headache, abdominal pain, fevers, chills. Endorses R hip pain.    In the ED, patient is afebrile, VSS. WBC 9.28, H/H 10.7/33.6. Lactate 3.1, improved to 1.5 s/p 1L bolus. CT w/ findings of acute fractures of R superior pubic ramus, R inferior pubic ramus, R ischium/ischial tuberosity, R sacral ala age-indeterminate nondisplaced fracture. Also, small hematoma abutting R anterolateral bladder wall. UA + for RBCs.      PAST MEDICAL & SURGICAL HISTORY:  Hypertension      Hyperlipidemia      Delusional disorder      Cataracts, bilateral      Atrial tachycardia      H/O fracture of patella      S/P breast biopsy      H/O cataract extraction      H/O fracture of patella      H/O fracture of patella        [  ] No significant past history as reviewed with the patient and family    Primary Survey:   A - airway intact  B - bilateral breath sounds and good chest rise  C - initial BP: 104/73 (23 @ 15:07) , HR: 66 (23 @ 15:07) , palpable pulses in all extremities  D - GCS 15 on arrival  Exposure obtained    Secondary Survey:  General: NAD  HEENT: Normocephalic, atraumatic, EOMI, PEERLA  Neck: Soft, midline trachea  Chest: No chest wall tenderness   Cardiac: S1, S2, RRR  Respiratory: Bilateral breath sounds, clear and equal bilaterally  Abdomen: Soft, non-distended, non-tender, no rebound, no guarding, no masses palpated  Pelvis: Stable, R sided tenderness  Ext: palp radial b/l UE, b/l DP palp in Lower Extrem, motor and sensory grossly intact except R hip ROM limited by pain  Back: no TTP, no palpable runoff/stepoff/deformity      TERTIARY SURVEY:   GEN: resting comfortably in bed, in NAD  HEENT: normocephalic, non-tender to palpation, R parietooccipital scabbed small abrasion w/ small hematoma, no step-offs palpated  NECK: no JVD, non-tender to palpation at posterior midline, no pain with flexion, extension, and bilateral neck rotation  CHEST: non-tender to palpation across clavicles and b/l anterior ribs  BACK: non-tender to palpation along cervical, thoracic, lumbar spine midline and b/l posterior ribs; no palpable step-offs or hematomas  ABD: soft, non-distended, non-tender to palpation in all quadrants without rebound tenderness or guarding  LUE: non-tender to palpation across upper and lower arm, 5/5  strength, fingers warm, well-perfused, full ROM in shoulder, elbow, wrist, and fingers, palpable radial pulses  RUE: non-tender to palpation across upper and lower arm, 5/5  strength, fingers warm, well-perfused, full ROM in shoulder, elbow, wrist, and fingers, palpable radial pulses  LLE: non-tender to palpation across upper and lower leg; full ROM in hip, knee, ankle, and toes, 5/5 dorsiflexion + plantarflexion, palpable DP pulses; warm, well-perfused  RLE: non-tender to palpation across upper and lower leg; full ROM in knee, ankle, and toes, hip ROM limited d/t pain, 5/5 dorsiflexion + plantarflexion, palpable DP pulses; warm, well-perfused  NEURO: AAOx1, no focal neuro deficits; CN II-IX intact     Medications (inpatient): aMIOdarone    Tablet 200 milliGRAM(s) Oral daily  amLODIPine   Tablet 5 milliGRAM(s) Oral daily  apixaban 2.5 milliGRAM(s) Oral every 12 hours  atorvastatin 10 milliGRAM(s) Oral at bedtime  lidocaine   4% Patch 1 Patch Transdermal daily  naloxone Injectable 0.4 milliGRAM(s) IV Push once  polyethylene glycol 3350 17 Gram(s) Oral daily  risperiDONE   Tablet 0.5 milliGRAM(s) Oral at bedtime  senna 2 Tablet(s) Oral at bedtime  sodium chloride 0.9%. 1000 milliLiter(s) IV Continuous <Continuous>  timolol 0.5% Solution 1 Drop(s) Both EYES two times a day    Medications (PRN):acetaminophen     Tablet .. 650 milliGRAM(s) Oral every 6 hours PRN  aluminum hydroxide/magnesium hydroxide/simethicone Suspension 30 milliLiter(s) Oral every 4 hours PRN  bisacodyl 5 milliGRAM(s) Oral daily PRN  LORazepam     Tablet 0.5 milliGRAM(s) Oral every 12 hours PRN  melatonin 3 milliGRAM(s) Oral at bedtime PRN  ondansetron Injectable 4 milliGRAM(s) IV Push every 8 hours PRN  oxyCODONE    IR 2.5 milliGRAM(s) Oral every 4 hours PRN  oxyCODONE    IR 5 milliGRAM(s) Oral every 4 hours PRN  pantoprazole    Tablet 40 milliGRAM(s) Oral before breakfast PRN    Allergies: Cipro (Unknown)  latex (Urticaria)  penicillin (Unknown)  (Intolerances: )    Vital Signs Last 24 Hrs  T(C): 37.1 (2023 05:30), Max: 37.1 (2023 05:30)  T(F): 98.7 (2023 05:30), Max: 98.7 (2023 05:30)  HR: 63 (2023 05:30) (60 - 78)  BP: 126/66 (2023 05:30) (104/73 - 144/72)  BP(mean): 86 (2023 23:03) (86 - 86)  RR: 18 (2023 05:30) (17 - 20)  SpO2: 93% (2023 05:30) (91% - 99%)    Parameters below as of 2023 05:30  Patient On (Oxygen Delivery Method): nasal cannula  O2 Flow (L/min): 4    Drug Dosing Weight    Weight (kg): 59 (2023 10:42)                          10.7   9.28  )-----------( 176      ( 2023 11:50 )             33.6     11-05    133<L>  |  96  |  26<H>  ----------------------------<  140<H>  4.3   |  20<L>  |  0.91    Ca    9.2      2023 11:50    TPro  7.8  /  Alb  3.6  /  TBili  1.1  /  DBili  x   /  AST  61<H>  /  ALT  44  /  AlkPhos  136<H>  11-05    PT/INR - ( 2023 11:50 )   PT: 15.0 sec;   INR: 1.38 ratio         PTT - ( 2023 11:50 )  PTT:31.5 sec  Urinalysis Basic - ( 2023 23:22 )    Color: Yellow / Appearance: Clear / S.016 / pH: x  Gluc: x / Ketone: Negative mg/dL  / Bili: Negative / Urobili: 1.0 mg/dL   Blood: x / Protein: Negative mg/dL / Nitrite: Negative   Leuk Esterase: Small / RBC: 50 /HPF / WBC 5 /HPF   Sq Epi: x / Non Sq Epi: 1 /HPF / Bacteria: Negative /HPF        List Operative and Interventional Radiological Procedures:     Consults (Date):  [  ] Neurosurgery   [ x ] Orthopedics ()  [  ] Plastics  [ x ] Urology ()  [  ] PM&R  [  ] Social Work    RADIOLOGICAL FINDINGS REVIEW:  CXR:    < from: Xray Chest 1 View AP/PA (23 @ 12:00) >  FINDINGS:    The heart is stably enlarged.  The lungs are clear.  There is no pneumothorax or pleural effusion.  No acute displaced rib fracture.    IMPRESSION:  Clear lungs.    < end of copied text >      Pelvis Films:     < from: Xray Pelvis AP only (23 @ 11:59) >  IMPRESSION:  Acute mildly displaced fracture of the right superior and inferior pubic   rami.    < end of copied text >        Head/C-Spine CT:    < from: CT Cervical Spine No Cont (23 @ 12:24) >  IMPRESSION:    CT HEAD:  No evidence of acute calvarial fracture or intracranial hemorrhage. Right   parietal scalp hematoma. Chronic right greater than left cerebellar   infarcts. Additional findings described in detail above.    CT CERVICAL SPINE:  No definitive evidence of prevertebral soft tissue swelling or acute   fracture. Straightening of lordosis may reflectmuscle spasm. Grade 1   anterolisthesis C4 over C5 and C7 over T1. Additional findings, including   those degenerative, described in detail above. Partially imaged bilateral   upper lobe ground glass opacities.    < end of copied text >        Pelvis CT:    < from: CT Pelvis Bony Only No Cont (23 @ 12:25) >  IMPRESSION:    Acute fractures of the right superior pubic ramus, right inferior pubic   ramus, and right ischium/ischial tuberosity.    Cortical irregularity along the anterior aspect of the right sacral ala,   which may represent an age-indeterminate nondisplaced fracture.    Small hematoma which abuts the right anterolateral aspect of the bladder   wall. Recommend correlation with urinalysis to evaluate for hematuria. If   hematuria is present, recommend further evaluation with CT cystogram to   rule out bladder injury.    < end of copied text >        CT Cystogram:    < from: CT Abdomen and Pelvis Cystogram w/ Catheter (23 @ 18:37) >  IMPRESSION:  No extravasation of contrast to suggest bladder injury/rupture.    Redemonstrated acute fractures of the right superior and inferior pubic   rami and right ischial tuberosity. For complete evaluation, please refer   to same day CT pelvis report.    Hyperdense liver which can be seen in the setting of amiodarone   administration or deposition disease.    < end of copied text >      ASSESSMENT:   89y/o F w/ PMHx dementia, HTN, HLD, Afib on Eliquis who presents from home s/p fall 4 days prior, + HS, no LOC. Patient was unable to walk after the fall so was brought to the ED. In the ED, patient is afebrile, VSS. WBC 9.28, H/H 10.7/33.6. Lactate 3.1, improved to 1.5 s/p 1L bolus. CT w/ findings of acute fractures of R superior pubic ramus, R inferior pubic ramus, R ischium/ischial tuberosity, R sacral ala age-indeterminate nondisplaced fracture. Also, small hematoma abutting R anterolateral bladder wall. UA + for RBCs. CT cystogram negative for bladder perforation, likely hematoma.    PLAN:   - No acute trauma surgical intervention  - No additional injuries on tertiary exam  - No incidental findings noted on imaging review  - Rest of care per primary - please call back with additional questions or concerns.    Known Injuries:   - R superior pubic ramus fx  - R inferior pubic ramus fx  - R ischium/ischial tuberosity fx  - R sacral ala age-indeterminate nondisplaced fracture  - Small pelvic hematoma      ACS/Trauma  [id number]   TRAUMA SERVICE TERTIARY EXAM    Date of TTS:      23                        Time: 720  Admit Date:       23                       Trauma Activation: consult  Admit GCS: E- 4    V- 5    M- 6    HPI:  89y/o F w/ PMHx dementia, HTN, HLD, Afib on Eliquis who presents from home s/p fall 4 days prior. Patient's son is at bedside. Reports that patient fell from standing when turning to look at home aide 4 days ago, + HS, no LOC. Patient seemed to be ok after the fall; however, was unable to walk since the fall so was brought to the ED. Patient denies headache, abdominal pain, fevers, chills. Endorses R hip pain.    In the ED, patient is afebrile, VSS. WBC 9.28, H/H 10.7/33.6. Lactate 3.1, improved to 1.5 s/p 1L bolus. CT w/ findings of acute fractures of R superior pubic ramus, R inferior pubic ramus, R ischium/ischial tuberosity, R sacral ala age-indeterminate nondisplaced fracture. Also, small hematoma abutting R anterolateral bladder wall. UA + for RBCs.      PAST MEDICAL & SURGICAL HISTORY:  Hypertension      Hyperlipidemia      Delusional disorder      Cataracts, bilateral      Atrial tachycardia      H/O fracture of patella      S/P breast biopsy      H/O cataract extraction      H/O fracture of patella      H/O fracture of patella        [  ] No significant past history as reviewed with the patient and family    Primary Survey:   A - airway intact  B - bilateral breath sounds and good chest rise  C - initial BP: 104/73 (23 @ 15:07) , HR: 66 (23 @ 15:07) , palpable pulses in all extremities  D - GCS 15 on arrival  Exposure obtained    Secondary Survey:  General: NAD  HEENT: Normocephalic, atraumatic, EOMI, PEERLA  Neck: Soft, midline trachea  Chest: No chest wall tenderness   Cardiac: S1, S2, RRR  Respiratory: Bilateral breath sounds, clear and equal bilaterally  Abdomen: Soft, non-distended, non-tender, no rebound, no guarding, no masses palpated  Pelvis: Stable, R sided tenderness  Ext: palp radial b/l UE, b/l DP palp in Lower Extrem, motor and sensory grossly intact except R hip ROM limited by pain  Back: no TTP, no palpable runoff/stepoff/deformity      TERTIARY SURVEY:   GEN: resting comfortably in bed, in NAD  HEENT: normocephalic, non-tender to palpation, R parietooccipital scabbed small abrasion w/ small hematoma, no step-offs palpated  NECK: no JVD, non-tender to palpation at posterior midline, no pain with flexion, extension, and bilateral neck rotation  CHEST: non-tender to palpation across clavicles and b/l anterior ribs  BACK: non-tender to palpation along cervical, thoracic, lumbar spine midline and b/l posterior ribs; no palpable step-offs or hematomas  ABD: soft, non-distended, non-tender to palpation in all quadrants without rebound tenderness or guarding  LUE: non-tender to palpation across upper and lower arm, 5/5  strength, fingers warm, well-perfused, full ROM in shoulder, elbow, wrist, and fingers, palpable radial pulses  RUE: non-tender to palpation across upper and lower arm, 5/5  strength, fingers warm, well-perfused, full ROM in shoulder, elbow, wrist, and fingers, palpable radial pulses  LLE: non-tender to palpation across upper and lower leg; full ROM in hip, knee, ankle, and toes, 5/5 dorsiflexion + plantarflexion, palpable DP pulses; warm, well-perfused  RLE: non-tender to palpation across upper and lower leg; full ROM in knee, ankle, and toes, hip ROM limited d/t pain, 5/5 dorsiflexion + plantarflexion, palpable DP pulses; warm, well-perfused  NEURO: AAOx1, no focal neuro deficits; CN II-IX intact     Medications (inpatient): aMIOdarone    Tablet 200 milliGRAM(s) Oral daily  amLODIPine   Tablet 5 milliGRAM(s) Oral daily  apixaban 2.5 milliGRAM(s) Oral every 12 hours  atorvastatin 10 milliGRAM(s) Oral at bedtime  lidocaine   4% Patch 1 Patch Transdermal daily  naloxone Injectable 0.4 milliGRAM(s) IV Push once  polyethylene glycol 3350 17 Gram(s) Oral daily  risperiDONE   Tablet 0.5 milliGRAM(s) Oral at bedtime  senna 2 Tablet(s) Oral at bedtime  sodium chloride 0.9%. 1000 milliLiter(s) IV Continuous <Continuous>  timolol 0.5% Solution 1 Drop(s) Both EYES two times a day    Medications (PRN):acetaminophen     Tablet .. 650 milliGRAM(s) Oral every 6 hours PRN  aluminum hydroxide/magnesium hydroxide/simethicone Suspension 30 milliLiter(s) Oral every 4 hours PRN  bisacodyl 5 milliGRAM(s) Oral daily PRN  LORazepam     Tablet 0.5 milliGRAM(s) Oral every 12 hours PRN  melatonin 3 milliGRAM(s) Oral at bedtime PRN  ondansetron Injectable 4 milliGRAM(s) IV Push every 8 hours PRN  oxyCODONE    IR 2.5 milliGRAM(s) Oral every 4 hours PRN  oxyCODONE    IR 5 milliGRAM(s) Oral every 4 hours PRN  pantoprazole    Tablet 40 milliGRAM(s) Oral before breakfast PRN    Allergies: Cipro (Unknown)  latex (Urticaria)  penicillin (Unknown)  (Intolerances: )    Vital Signs Last 24 Hrs  T(C): 37.1 (2023 05:30), Max: 37.1 (2023 05:30)  T(F): 98.7 (2023 05:30), Max: 98.7 (2023 05:30)  HR: 63 (2023 05:30) (60 - 78)  BP: 126/66 (2023 05:30) (104/73 - 144/72)  BP(mean): 86 (2023 23:03) (86 - 86)  RR: 18 (2023 05:30) (17 - 20)  SpO2: 93% (2023 05:30) (91% - 99%)    Parameters below as of 2023 05:30  Patient On (Oxygen Delivery Method): nasal cannula  O2 Flow (L/min): 4    Drug Dosing Weight    Weight (kg): 59 (2023 10:42)                          10.7   9.28  )-----------( 176      ( 2023 11:50 )             33.6     11-05    133<L>  |  96  |  26<H>  ----------------------------<  140<H>  4.3   |  20<L>  |  0.91    Ca    9.2      2023 11:50    TPro  7.8  /  Alb  3.6  /  TBili  1.1  /  DBili  x   /  AST  61<H>  /  ALT  44  /  AlkPhos  136<H>  11-05    PT/INR - ( 2023 11:50 )   PT: 15.0 sec;   INR: 1.38 ratio         PTT - ( 2023 11:50 )  PTT:31.5 sec  Urinalysis Basic - ( 2023 23:22 )    Color: Yellow / Appearance: Clear / S.016 / pH: x  Gluc: x / Ketone: Negative mg/dL  / Bili: Negative / Urobili: 1.0 mg/dL   Blood: x / Protein: Negative mg/dL / Nitrite: Negative   Leuk Esterase: Small / RBC: 50 /HPF / WBC 5 /HPF   Sq Epi: x / Non Sq Epi: 1 /HPF / Bacteria: Negative /HPF        List Operative and Interventional Radiological Procedures:     Consults (Date):  [  ] Neurosurgery   [ x ] Orthopedics ()  [  ] Plastics  [ x ] Urology ()  [  ] PM&R  [  ] Social Work    RADIOLOGICAL FINDINGS REVIEW:  CXR:    < from: Xray Chest 1 View AP/PA (23 @ 12:00) >  FINDINGS:    The heart is stably enlarged.  The lungs are clear.  There is no pneumothorax or pleural effusion.  No acute displaced rib fracture.    IMPRESSION:  Clear lungs.    < end of copied text >      Pelvis Films:     < from: Xray Pelvis AP only (23 @ 11:59) >  IMPRESSION:  Acute mildly displaced fracture of the right superior and inferior pubic   rami.    < end of copied text >        Head/C-Spine CT:    < from: CT Cervical Spine No Cont (23 @ 12:24) >  IMPRESSION:    CT HEAD:  No evidence of acute calvarial fracture or intracranial hemorrhage. Right   parietal scalp hematoma. Chronic right greater than left cerebellar   infarcts. Additional findings described in detail above.    CT CERVICAL SPINE:  No definitive evidence of prevertebral soft tissue swelling or acute   fracture. Straightening of lordosis may reflectmuscle spasm. Grade 1   anterolisthesis C4 over C5 and C7 over T1. Additional findings, including   those degenerative, described in detail above. Partially imaged bilateral   upper lobe ground glass opacities.    < end of copied text >        Pelvis CT:    < from: CT Pelvis Bony Only No Cont (23 @ 12:25) >  IMPRESSION:    Acute fractures of the right superior pubic ramus, right inferior pubic   ramus, and right ischium/ischial tuberosity.    Cortical irregularity along the anterior aspect of the right sacral ala,   which may represent an age-indeterminate nondisplaced fracture.    Small hematoma which abuts the right anterolateral aspect of the bladder   wall. Recommend correlation with urinalysis to evaluate for hematuria. If   hematuria is present, recommend further evaluation with CT cystogram to   rule out bladder injury.    < end of copied text >        CT Cystogram:    < from: CT Abdomen and Pelvis Cystogram w/ Catheter (23 @ 18:37) >  IMPRESSION:  No extravasation of contrast to suggest bladder injury/rupture.    Redemonstrated acute fractures of the right superior and inferior pubic   rami and right ischial tuberosity. For complete evaluation, please refer   to same day CT pelvis report.    Hyperdense liver which can be seen in the setting of amiodarone   administration or deposition disease.    < end of copied text >      ASSESSMENT:   89y/o F w/ PMHx dementia, HTN, HLD, Afib on Eliquis who presents from home s/p fall 4 days prior, + HS, no LOC. Patient was unable to walk after the fall so was brought to the ED. In the ED, patient is afebrile, VSS. WBC 9.28, H/H 10.7/33.6. Lactate 3.1, improved to 1.5 s/p 1L bolus. CT w/ findings of acute fractures of R superior pubic ramus, R inferior pubic ramus, R ischium/ischial tuberosity, R sacral ala age-indeterminate nondisplaced fracture. Also, small hematoma abutting R anterolateral bladder wall. UA + for RBCs. CT cystogram negative for bladder perforation, likely hematoma.    PLAN:   - No acute trauma surgical intervention  - H/H stable - AC ok from trauma perspective  - No additional injuries on tertiary exam  - No incidental findings noted on imaging review  - Rest of care per primary - please call back with additional questions or concerns.    Known Injuries:   - R superior pubic ramus fx  - R inferior pubic ramus fx  - R ischium/ischial tuberosity fx  - R sacral ala age-indeterminate nondisplaced fracture  - Small pelvic hematoma      ACS/Trauma  [id number]

## 2023-11-06 NOTE — OCCUPATIONAL THERAPY INITIAL EVALUATION ADULT - DIAGNOSIS, OT EVAL
Pt presents s/p fall + R pubic rami fx with decreased functional mobility and independence with ADLs

## 2023-11-06 NOTE — OCCUPATIONAL THERAPY INITIAL EVALUATION ADULT - STRENGTHENING, PT EVAL
GOAL: Pt will increase RLE strength 1 grade for increased safety and independence with ADL task in 4 weeks.

## 2023-11-06 NOTE — PATIENT PROFILE ADULT - NSPROPTRIGHTSUPPORTPERSON_GEN_A_NUR
ELI patient. Scheduled COLONOSCOPY/EGD 7-   Prep paperwork GIVEN TO PT. PSC TO CALL WARRNIKKIE.  Patient advised arrival time may change based on Merged with [facility] guidelines. [facility]    
same name as above

## 2023-11-06 NOTE — PATIENT PROFILE ADULT - FALL HARM RISK - HARM RISK INTERVENTIONS

## 2023-11-06 NOTE — OCCUPATIONAL THERAPY INITIAL EVALUATION ADULT - NSOTDISCHREC_GEN_A_CORE
Sub-acute rehab, If home pt will need 24/7 assist with all mobility and ADLs + transport w/c 2/2 impaired balance, strength and cog.

## 2023-11-06 NOTE — OCCUPATIONAL THERAPY INITIAL EVALUATION ADULT - LIVES WITH, PROFILE
Pt a poor Pt a poor historian + confused, unable to provide accurate  information at this time. Pt a poor historian + confused, unable to provide accurate  information at this time. Per PT note who spoke with pt son, pt lives in a private home with 24/7 HHA and ambulated  with RW PTA and owns shower chair.

## 2023-11-06 NOTE — PHYSICAL THERAPY INITIAL EVALUATION ADULT - ADDITIONAL COMMENTS
pt is a poor historian. Spoke with son Rupert, pt lives in a private home with 24/7 HHA. pt ambulates with walker and owns shower chair.

## 2023-11-06 NOTE — PROGRESS NOTE ADULT - ASSESSMENT
{\rtf1\wqhbsv33510\ansi\qgtiqmy4229\ftnbj\uc1\deff0  {\fonttbl{\f0 \fnil Segoe UI;}{\f1 \fnil \fcharset0 Segoe UI;}{\f2 \fnil Times New Alf;}}  {\colortbl ;\rvk972\ifdfa212\nbuo428 ;\red0\green0\blue0 ;\red0\green0\etop342 ;\red0\green0\blue0 ;}  {\stylesheet{\f0\fs20 Normal;}{\cs1 Default Paragraph Font;}{\cs2\f0\fs16 Line Number;}{\cs3\f2\fs24\ul\cf3 Hyperlink;}}  {\*\revtbl{Unknown;}}  \vycusc48453\vhxukf37630\ggjxc3150\idtau1156\qqggd0432\vnsxq2413\pggsrof349\aeghine455\nogrowautofit\mwnrss901\formshade\nofeaturethrottle1\dntblnsbdb\fet4\aendnotes\aftnnrlc\pgbrdrhead\pgbrdrfoot  \sectd\rengdz20952\aynowp79387\guttersxn0\inevuetf7979\wvfxzdwm3008\cviuzqlj5219\cqseyrxl4822\vtauqew044\gwxlnrr338\sbkpage\pgncont\pgndec  \plain\plain\f0\fs24\pard\plain\f0\fs24\plain\f0\fs20\ktod5498\hich\f0\dbch\f0\loch\f0\fs20\par  _________________________________________________________________________________________\par  ========>>  M E D I C A L   A T T E N D I N G    F O L L O W  U P  N O T E  <<=========\par  -----------------------------------------------------------------------------------------------------\par  \par  - Patient seen and examined by me earlier today. \par  - In summary,  CONNER CELESTE is a 89y year old woman admitted post fall, pelvic fracture.. \par  - Patient today overall doing ok, comfortable, denies having any pain.. no other complains of otherwise,, \par  \par  ==================>> REVIEW OF SYSTEM <<=================\par  \par  GEN: no fever, no chills, no pain at rest in bed \par  RESP: no SOB, no cough\par  CVS: no chest pain, no palpitations\par  GI: no abdominal pain, no nausea, \par  : no dysuria, no issues with parmar reported \par  Neuro: no headache, no dizziness\par  \par  ==================>> PHYSICAL EXAM <<=================\par  \par  GEN: A&O X 1-2 , NAD , comfortable, pleasant, calm in bed.. \par  HEENT: NCAT, PERRL, MMM, hearing intact\par  CVS: S1S2 , regular , No M/R/G appreciated\par  PULM: CTA B/L,  no W/R/R appreciated.. limited exam as not taking deep breaths \par  ABD.: soft. non tender, non distended,  bowel sounds present\par  Extrem: intact pulses , no edema \par     + Parmar in place with slight blood tinged / grapefruit colored urine \par  \par        ( Note written / Date of service 11-06-23 ( This is certified to be the same as "ENTERED" date above ( for billing purposes)))\par    ==================>> MEDICATIONS <<====================\par  \par  MEDICATIONS  (STANDING):\par  aMIOdarone    Tablet 200 milliGRAM(s) Oral daily\par  amLODIPine   Tablet 5 milliGRAM(s) Oral daily\par  apixaban 2.5 milliGRAM(s) Oral every 12 hours\par  atorvastatin 10 milliGRAM(s) Oral at bedtime\par  lidocaine   4% Patch 1 Patch Transdermal daily\par  naloxone Injectable 0.4 milliGRAM(s) IV Push once\par  polyethylene glycol 3350 17 Gram(s) Oral daily\par  risperiDONE   Tablet 0.5 milliGRAM(s) Oral at bedtime\par  senna 2 Tablet(s) Oral at bedtime\par  sodium chloride 0.9%. 1000 milliLiter(s) (75 mL/Hr) IV Continuous <Continuous>\par  timolol 0.5% Solution 1 Drop(s) Both EYES two times a day\par  \par  MEDICATIONS  (PRN):\par  acetaminophen     Tablet .. 650 milliGRAM(s) Oral every 6 hours PRN Temp greater or equal to 38C (100.4F), Mild Pain (1 - 3)\par  aluminum hydroxide/magnesium hydroxide/simethicone Suspension 30 milliLiter(s) Oral every 4 hours PRN Dyspepsia\par  bisacodyl 5 milliGRAM(s) Oral daily PRN Constipation\par  LORazepam     Tablet 0.5 milliGRAM(s) Oral every 12 hours PRN for anxiety\par  melatonin 3 milliGRAM(s) Oral at bedtime PRN Insomnia\par  ondansetron Injectable 4 milliGRAM(s) IV Push every 8 hours PRN Nausea and/or Vomiting\par  oxyCODONE    IR 2.5 milliGRAM(s) Oral every 4 hours PRN Moderate Pain (4 - 6)\par  oxyCODONE    IR 5 milliGRAM(s) Oral every 4 hours PRN Severe Pain (7 - 10)\par  pantoprazole    Tablet 40 milliGRAM(s) Oral before breakfast PRN gerd\par  \par  ___________\par  Active diet:\par  Diet, DASH/TLC: \par  Sodium & Cholesterol Restricted\par  ___________________\par  \par  ==================>> VITAL SIGNS <<==================\par  \par  T(C): 37.1 (11-06-23 @ 05:30), Max: 37.1 (11-06-23 @ 05:30)\par  HR: 63 (11-06-23 @ 05:30) (60 - 78)\par  BP: 126/66 (11-06-23 @ 05:30) (104/73 - 144/72)\par  BP(mean): 86 (11-05-23 @ 23:03)\par  RR: 18 (11-06-23 @ 05:30) (17 - 20)\par  SpO2: 93% (11-06-23 @ 05:30) (91% - 99%) \par  \par  \ltrpar\ql\plain\f0\fs24\plain\f0\fs20\omea8463\hich\f0\dbch\f0\loch\f0\fs20 I&O's Summary\par  \pard\plain\f0\fs24\plain\f0\fs20\tscm4989\hich\f0\dbch\f0\loch\f0\fs20\par  05 Nov 2023 07:01  -  06 Nov 2023 07:00\par  --------------------------------------------------------\par  IN: 0 mL / OUT: 1200 mL / NET: -1200 mL\par  \par   ==================>> LAB AND IMAGING <<==================\par           \par             10.2 \par  8.49  )-----------( 183      ( 06 Nov 2023 08:00 )\par             31.6 \par     \par  11-06\par  \par  138  |  101  |  22\par  ----------------------------<  111<H>\par  \plain\f1\fs20\opia9192\hich\f1\dbch\f1\loch\f1\cf2\fs20\b 3.4<L>\plain\f0\fs20\aije2973\hich\f0\dbch\f0\loch\f0\fs20    |  25  |  0.91\par  \par  Ca    8.6      06 Nov 2023 08:00\par  Phos  3.0     11-06\par  Mg     2.1     11-06\par  \par  TPro  7.1  /  Alb  3.3  /  TBili  1.2  /  DBili  x   /  AST  43<H>  /  ALT  42  /  AlkPhos  125<H>  11-06\par  \par  PT/INR - ( 06 Nov 2023 08:00 )   PT: 14.1 sec;   INR: 1.35 ratio  \par  \par  Urinalysis (11.05.23 @ 23:22)\par    pH Urine: 6.0\par   Glucose Qualitative, Urine: Negative mg/dL\par   Blood, Urine: Moderate\par   Color: Yellow\par   Urine Appearance: Clear\par   Bilirubin: Negative\par   Ketone - Urine: Negative mg/dL\par   Specific Gravity: 1.016\par   Protein, Urine: Negative mg/dL\par   Urobilinogen: 1.0 mg/dL\par   Nitrite: Negative\par   Leukocyte Esterase Concentration: Small\par  \par  \par  Lipid profile:  (10-09-23)\par     Total: 129\par     LDL  : (p)\par     HDL  :61\par     TG   :62\par   \par  imaging reports reviewed in EMR \par  \par  ___________________________________________________________________________________\par  ===============>>  A S S E S S M E N T   A N D   P L A N <<===============\par  ------------------------------------------------------------------------------------------\par  \par  \trowd\bwqmqq74\lastrow\ohfaejm30\trpaddfl3\rmyggdx25\trpaddfr3\trpaddt0\trpaddft3\trpaddb0\trpaddfb3\trleft0  \clvertalt\dunhhp58\clpadft3\zfpdgb81\clpadfr3\clpadl0\clpadfl3\clpadb0\clpadfb3\wmmon3542  \clvertalt\sjytds57\clpadft3\fawynl66\clpadfr3\clpadl0\clpadfl3\clpadb0\clpadfb3\mokyk3820  \pard\intbl\ssparaaux0\s0\fi-120\li120\ql\plain\f0\fs24{\*\bkmkstart [id number]}{\*\bkmkend gc47400009010}\plain\f0\fs20\aeds9286\hich\f0\dbch\f0\loch\f0\fs20 \'b7 \plain\f1\fs20\bnwg9488\hich\f1\dbch\f1\loch\f1\cf2\fs20\b Assessment\plain\f0\fs20\debv9840\hich\f0\dbch\f0\loch\f0\fs20\cell  \pard\intbl\ssparaaux0\s0\ql\plain\f0\fs24\plain\f0\fs20\fndw7890\hich\f0\dbch\f0\loch\f0\fs20\cell  \intbl\row  \pard\ssparaaux0\s0\ql\plain\f0\fs24\plain\f0\fs20\edzi9341\hich\f0\dbch\f0\loch\f0\fs20 89yo 60kg f w pmh mci/dementia, HTN, HLD, pafib, constipation, gerd, p/w inability to ambulate following recent fall; in er, found to have right sup+inf pubic rami   and right ischial tuberosity fractures a/w small hematoma over right anterolateral bladder wall; admit to medicine for further mgmt\par  \par  \plain\f1\fs20\qury8358\hich\f1\dbch\f1\loch\f1\cf2\fs20\strike\plain\f1\fs20\yqff1105\hich\f1\dbch\f1\loch\f1\cf2\fs20\plain\f0\fs20\oztd3707\hich\f0\dbch\f0\loch\f0\fs20\par  \plain\f1\fs20\fmrt3288\hich\f1\dbch\f1\loch\f1\cf2\fs20\b\ul{\field{\*\fldinst HYPERLINK [id number] }{\fldrslt Problem/Plan - 1:}}\plain\f0\fs20\ezcp0693\hich\f0\dbch\f0\loch\f0\fs20\ql\par  \'b7  {\*\bkmkstart tj92768195174}{\*\bkmkend jg57790927701}Problem: patient post fall with : \par     - R superior pubic ramus fx\par     - R inferior pubic ramus fx\par     - R ischium/ischial tuberosity fx\par     - R sacral ala age-indeterminate nondisplaced fracture\par     - Small pelvic hematoma\par  appreciated Ortho and  trauma follow up: no surgical intervention\par  WBAT / PT\par  pain management as needed \par  fall and fracture precautions\par  check vitamin D levels\par  ? DEXA scan outpatient vs just give Fosamax as outpatient\par  \par  ** Urology appreciated re bladder injury and pelvic hematoma\par     no evidence of bladder perforatoin\par     possible TOV tomorrow :  parmar inserted in er : keep today \par  \par  \plain\f1\fs20\ijyz4493\hich\f1\dbch\f1\loch\f1\cf2\fs20\b\ul{\field{\*\fldinst HYPERLINK [id number] }{\fldrslt Problem/Plan - 2:}}\plain\f0\fs20\frhb3576\hich\f0\dbch\f0\loch\f0\fs20\ql\par  \'b7  {\*\bkmkstart hf07592880654}{\*\bkmkend xl41595319036}Problem: {\*\bkmkstart qy58156191803}{\*\bkmkend zx67734446764}Fall. \par    {\*\bkmkstart ce81553108608}{\*\bkmkend ar19979973433}h/o mci/dementia   \par    trauma work up as above\par    likely mechanical in nature \par  Monitor mental status with frequent neurochecks\par  maintain falll and fracture, delirium, seizure, aspiration precautions; keep head end of bed elevated\par  cont home risperidone qhs + prn ativan\par  pt/ot eval + sw/cm consult for disposition\par  patient is DNR per prior conversation\par  \par  \plain\f1\fs20\kuxl3319\hich\f1\dbch\f1\loch\f1\cf2\fs20\b\ul{\field{\*\fldinst HYPERLINK [id number] }{\fldrslt Problem/Plan - 3:}}\plain\f0\fs20\ywwv2130\hich\f0\dbch\f0\loch\f0\fs20\ql\par  \'b7  {\*\bkmkstart tv11452275432}{\*\bkmkend pi42200565250}Problem: {\*\bkmkstart pt69759017742}{\*\bkmkend wp25600960902}PAF (paroxysmal atrial fibrillation). 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)\par  recently was dc'd off toprol upon discharge from recent hospitalization but resumed on toprol by outpatient provider; hold for now given 1st degree avb and bradycardia << monitor \par  continue home eliquis for now given no active gross bleeding, stable + baseline hgb, and hds otherwise iso chadsvasc ~6.\par  \par  \plain\f1\fs20\ekga5669\hich\f1\dbch\f1\loch\f1\cf2\fs20\b\ul{\field{\*\fldinst HYPERLINK [id number] }{\fldrslt Problem/Plan - 4:}}\plain\f0\fs20\tbtz9345\hich\f0\dbch\f0\loch\f0\fs20\ql\par  \'b7  {\*\bkmkstart cc55572134266}{\*\bkmkend fz71210257296}Problem: {\*\bkmkstart zo01001516918}{\*\bkmkend wo73312811747}HTN (hypertension). \par  \'b7  {\*\bkmkstart wz09267410343}{\*\bkmkend wq81663802901}Plan: {\*\bkmkstart am69640778404}{\*\bkmkend mg82283611716}cont home norvasc\par  recently discontinued off of hctz given hypona\par  recently was dc'd off toprol upon discharge from recent hospitalization but resumed on toprol by outpatient provider; hold for now given 1st degree avb and bradycardia.\par  \par  \plain\f1\fs20\iwbo9097\hich\f1\dbch\f1\loch\f1\cf2\fs20\b\ul{\field{\*\fldinst HYPERLINK [id number] }{\fldrslt Problem/Plan - 5:}}\plain\f0\fs20\rtmj6181\hich\f0\dbch\f0\loch\f0\fs20\ql\par  \'b7  {\*\bkmkstart wr27729941573}{\*\bkmkend vj94695043194}Problem: {\*\bkmkstart ue51981324350}{\*\bkmkend oy32649667027}HLD (hyperlipidemia). \par  \'b7  {\*\bkmkstart jf37822131744}{\*\bkmkend we25524049915}Plan: {\*\bkmkstart nz43575774545}{\*\bkmkend uq24141358370}cont home lipitor.\par  \par  \plain\f1\fs20\lnos0131\hich\f1\dbch\f1\loch\f1\cf2\fs20\b\ul{\field{\*\fldinst HYPERLINK [id number] }{\fldrslt Problem/Plan - 6:}}\plain\f0\fs20\kkod0439\hich\f0\dbch\f0\loch\f0\fs20\ql\par  \'b7  {\*\bkmkstart ye75624100397}{\*\bkmkend va64432430555}Problem: {\*\bkmkstart of37355599726}{\*\bkmkend ly31265090863}GERD (gastroesophageal reflux disease). \par  \'b7  {\*\bkmkstart al81510396151}{\*\bkmkend ed99907752319}Plan: {\*\bkmkstart jz06987142043}{\*\bkmkend qi71787226950}cont home prilosec prn => protonix.\par  \par  \plain\f1\fs20\vnpa1853\hich\f1\dbch\f1\loch\f1\cf2\fs20\b\ul{\field{\*\fldinst HYPERLINK [id number] }{\fldrslt Problem/Plan - 7:}}\plain\f0\fs20\cmxj8544\hich\f0\dbch\f0\loch\f0\fs20\ql\par  \'b7  {\*\bkmkstart ct88009929166}{\*\bkmkend lr84063051114}Problem: {\*\bkmkstart yj95299812301}{\*\bkmkend fv35672742233}Constipation. \par  \'b7  {\*\bkmkstart gz72150729303}{\*\bkmkend ft43026098587}Plan: {\*\bkmkstart dg94707504206}{\*\bkmkend na47789450736}cont home bowel regimen{\*\bkmkstart bkcommentCR}{\*\bkmkend bkcommentCR}.\par  \pard\plain\f0\fs24\plain\f0\fs20\oxcr1794\hich\f0\dbch\f0\loch\f0\fs20\par  -GI/DVT Prophylaxis per protocol.\par  \par  --------------------------------------------\par  Case discussed with patient.. \par  Education given on findings and plan of care\par  ___________________________\par  MIGUEL Kelly D.O.\par  Pager: [phone]\par  \par  \ql\plain\f0\fs24\plain\f0\fs20\comz7807\hich\f0\dbch\f0\loch\f0\fs20\par  }

## 2023-11-06 NOTE — PHYSICAL THERAPY INITIAL EVALUATION ADULT - NSPTDISCHREC_GEN_A_CORE
FREDERIC to increase overall strength, endurance and balance. If home, home PT with continued 24/7 HHA. TOMAS Fontanez aware./Sub-acute Rehab

## 2023-11-06 NOTE — PROGRESS NOTE ADULT - ASSESSMENT
89yo F presenting after fall from home with pelvic fractures in the setting of a small hematoma adjacent to the bladder. Reviewing the images and given the urine was clear yellow have low suspicion for perforation but hematoma could be secondary to fracture in pelvis vs bladder contusion.     Recommendations  - CT cystogram to evaluate for bladder perforation - showing no signs of perforation  - Can give TOV  - Rest of care per primary  - Discussed with Dr. Minor 89yo F presenting after fall from home with pelvic fractures in the setting of a small hematoma adjacent to the bladder. Reviewing the images and given the urine was clear yellow have low suspicion for perforation but hematoma could be secondary to fracture in pelvis vs bladder contusion.     Recommendations  - CT cystogram to evaluate for bladder perforation - showing no signs of perforation  - Keep parmar catheter for now given recent possible bladder trauma  - Will consider TOV tomorrow  - Rest of care per primary  - Discussed with Dr. Minor

## 2023-11-06 NOTE — PROGRESS NOTE ADULT - SUBJECTIVE AND OBJECTIVE BOX
Subjective  Patient seen and examined. Resting comfortably. No complaints.    Objective    Vital signs  T(F): , Max: 98.7 (11-06-23 @ 05:30)  HR: 63 (11-06-23 @ 05:30)  BP: 126/66 (11-06-23 @ 05:30)  SpO2: 93% (11-06-23 @ 05:30)  Wt(kg): --    Output     OUT:    Indwelling Catheter - Urethral (mL): 1200 mL  Total OUT: 1200 mL    Total NET: -1200 mL          Gen: NAD  Abd: soft, nontender, nondistended  : parmar secured in place, draining CYU, no suprapubic tenderness    Labs      11-05 @ 11:50    WBC 9.28  / Hct 33.6  / SCr 0.91           Urine Cx: pending    Imaging  < from: CT Abdomen and Pelvis Cystogram w/ Catheter (11.05.23 @ 18:37) >    ACC: [id number] EXAM:  CT ABD PELV CYSTOGRAMEXISTCATH   ORDERED BY:  TAWANA DURAN     PROCEDURE DATE:  11/05/2023          INTERPRETATION:  CLINICAL INFORMATION: Pelvic fracture. Concern for   bladder injury    COMPARISON: CT pelvis 11/5/2023. CT abdomen/pelvis 10/8/2023.    CONTRAST/COMPLICATIONS:  IV Contrast: NONE  Oral Contrast: Omnipaque 300 via Parmar catheter.  Complications: None reported at time of study completion    PROCEDURE:  CT of the Pelvis was performed (CT Cystogram).  Precontrast images were obtained through the pelvis followed by imaging   after the installation of a dilute mixture of Dixvapfva333 via a Parmar   catheter.  Sagittal and coronal reformats were performed.    FINDINGS:  BLADDER: On noncontrast imaging, the bladder is decompressed with an   indwelling Parmar balloon catheter. On postcontrast imaging, the bladder   is distended with contrast without evidence of extravasation to suggest   bladder injury/rupture.  REPRODUCTIVE ORGANS: Uterus within normal limits.  LYMPH NODES: No pelvic lymphadenopathy.    VISUALIZED PORTIONS:  ABDOMINAL ORGANS: Cholelithiasis. Partially visualized hyperdense liver.  BOWEL: Within normal limits.  PERITONEUM: No ascites.  VESSELS: Atherosclerotic changes.  ABDOMINAL WALL: Small fat-containing left inguinal hernia. Diffuse   subcutaneous edema.  BONES: Degenerative changes. Acute comminuted fracture of the right   superior and inferior pubic rami and right ischial tuberosity. For   complete evaluation, please refer to same day CT pelvis report.    IMPRESSION:  No extravasation of contrast to suggest bladder injury/rupture.    Redemonstrated acute fractures of the right superior and inferior pubic   rami and right ischial tuberosity. For complete evaluation, please refer   to same day CT pelvis report.    Hyperdense liver which can be seen in the setting of amiodarone   administration or deposition disease.        --- End of Report ---           ARACELI SILVESTRE MD; Resident Radiologist  This document has been electronically signed.   AKUA PURI MD; Attending Radiologist  This document has been electronically signed. Nov 5 2023  7:40PM    < end of copied text >

## 2023-11-06 NOTE — PHYSICAL THERAPY INITIAL EVALUATION ADULT - GENERAL OBSERVATIONS, REHAB EVAL
pt received pt received supine in ED, 3L 02 NC, +IV, +parmar, A&0x1 (to self), following ~50% of commands

## 2023-11-06 NOTE — OCCUPATIONAL THERAPY INITIAL EVALUATION ADULT - PERTINENT HX OF CURRENT PROBLEM, REHAB EVAL
89yo 60kg f w pmh mci/dementia, HTN, HLD, pafib, constipation, gerd, p/w inability to ambulate following recent fall; unwitnessed, with head strike, while on antiplt and ac, no associated loc. patient is a poor historian but reportedly, according to hha, patient had fallen ~4 days ago. since then, patient has been unable to walk. at baseline, she walks with walker. family grew concerned so had patient brought ot [facility] er for further evaluation.in er, found to have right sup+inf pubic rami and right ischial tuberosity fractures a/w small hematoma over right anterolateral bladder wall; admit to medicine for further mgmt.Pt presents s/p R Sup/inf pubic rami fx- WBAT   CT ABDOMEN : No extravasation of contrast to suggest bladder injury/rupture.Redemonstrated acute fractures of the right superior and inferior pubic rami and right ischial tuberosity. For complete evaluation, please refer   to same day CT pelvis report.Hyperdense liver which can be seen in the setting of amiodarone administration or deposition disease.  CT HEAD:No evidence of acute calvarial fracture or intracranial hemorrhage. Right parietal scalp hematoma. 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 Additional findings described in detail above.CT CERVICAL SPINE:  No definitive evidence of prevertebral soft tissue swelling or acute fracture. Straightening of lordosis may reflect muscle spasm. Grade 1 anterolisthesis C4 over C5 and C7 over T1. Additional findings, including   those degenerative, described in detail above. Partially imaged bilateral upper lobe ground glass opacities.  CT CHEST: No evidence of mesenteric ischemia.Nonspecific pulmonary bilateral, upper lobe predominant groundglass   opacities. XRAY PELVIS: Similar-appearing acute minimally displaced fractures through the right   superior and inferior pubic rami. XRAY CHEST: Clear lungs. XRAY PELVIS: Acute mildly displaced fracture of the right superior and inferior pubic   rami.

## 2023-11-06 NOTE — PATIENT PROFILE ADULT - FUNCTIONAL ASSESSMENT - BASIC MOBILITY 6.
1-calculated by average/Not able to assess (calculate score using [facility] averaging method)

## 2023-11-07 NOTE — CHART NOTE - NSCHARTNOTEFT_GEN_A_CORE
Notified by RN, patient with cough.   Patient seen at bedside, in NAD. Patient states cough has been present for 1 week, nonproductive. Patient denies having taken anything prior for cough.  Respiratory exam limited as patient not taking deep breaths secondary to pain with deep breathing, however mild wheeze, rhonchi appreciated.   > S/p CXR 11/5 with clear lungs  > RVP ordered  > Tessalon perle ordered  > Duoneb x1 ordered   > Will continue to monitor   > Will endorse to AM team. Attending to follow.     Eze Richard PA-C  Dept. of Medicine  Spectra [phone]

## 2023-11-07 NOTE — PROGRESS NOTE ADULT - ASSESSMENT
89yo F presenting after fall from home with pelvic fractures in the setting of a small hematoma adjacent to the bladder. Reviewing the images and given the urine was clear yellow have low suspicion for perforation but hematoma could be secondary to fracture in pelvis vs bladder contusion. CT cystogram showed no signs of bladder perforation since no extravasation of contrast was noted    Recommendations  - CT cystogram to evaluate for bladder perforation - showing no signs of perforation  - Keep parmar catheter for now given recent possible bladder trauma and improving hematuria  - Will consider TOV tomorrow if urine color continues to resume  - Rest of care per primary

## 2023-11-07 NOTE — PROGRESS NOTE ADULT - SUBJECTIVE AND OBJECTIVE BOX
Subjective  Patient seen and examined. Resting comfortably. Only complaining of her cough.    Objective    Vital signs  T(F): , Max: 99.5 (11-07-23 @ 00:01)  HR: 57 (11-07-23 @ 04:42)  BP: 130/72 (11-07-23 @ 04:42)  SpO2: 94% (11-07-23 @ 04:42)  Wt(kg): --    Output     OUT:    Indwelling Catheter - Urethral (mL): 1200 mL  Total OUT: 1200 mL    Total NET: -1200 mL      OUT:    Indwelling Catheter - Urethral (mL): 850 mL  Total OUT: 850 mL    Total NET: -850 mL      Gen: NAD  Abd: soft, nontender, nondistended, no suprapubic tenderness  : parmar secured in place, draining pink tinged urine    Labs      11-06 @ 08:00    WBC 8.49  / Hct 31.6  / SCr 0.91     11-05 @ 11:50    WBC 9.28  / Hct 33.6  / SCr 0.91         Culture - Urine (collected 11-05-23 @ 15:53)  Source: Clean Catch Clean Catch (Midstream)  Preliminary Report (11-06-23 @ 23:12):    >100,000 CFU/ml Escherichia coli    <10,000 CFU/ml Normal Urogenital simin present        Urine Cx: ?  Blood Cx: ?    Imaging

## 2023-11-07 NOTE — PROGRESS NOTE ADULT - ASSESSMENT
_________________________________________________________________________________________  ========>>  M E D I C A L   A T T E N D I N G    F O L L O W  U P  N O T E  <<=========  -----------------------------------------------------------------------------------------------------    - Patient seen and examined by me earlier today.   - In summary,  CONNER CELESTE is a 89y year old woman admitted post fall, pelvic fracture..   - Patient today overall doing ok, comfortable, denies having any pain..      poor appetite / not hungry..  but took some yogurt from me..     ==================>> REVIEW OF SYSTEM <<=================    limited as patient poor historian   denies any pain or discomfort     ==================>> PHYSICAL EXAM <<=================    GEN: A&O X 1 , NAD , comfortable, pleasant, calm in bed..   HEENT: NCAT, PERRL, MMM, hearing intact  CVS: S1S2 , regular , No M/R/G appreciated  PULM: CTA B/L,  no W/R/R appreciated.. limited exam as not taking deep breaths   ABD.: soft. non tender, non distended,  bowel sounds present  Extrem: intact pulses , no edema      + Parmar in place with dark urine        ( Note written / Date of service 11-07-23 ( This is certified to be the same as "ENTERED" date above ( for billing purposes)))    ==================>> MEDICATIONS <<====================    aMIOdarone    Tablet 200 milliGRAM(s) Oral daily  amLODIPine   Tablet 5 milliGRAM(s) Oral daily  apixaban 2.5 milliGRAM(s) Oral every 12 hours  atorvastatin 10 milliGRAM(s) Oral at bedtime  lidocaine   4% Patch 1 Patch Transdermal daily  naloxone Injectable 0.4 milliGRAM(s) IV Push once  polyethylene glycol 3350 17 Gram(s) Oral daily  risperiDONE   Tablet 0.5 milliGRAM(s) Oral at bedtime  senna 2 Tablet(s) Oral at bedtime  sodium chloride 0.9%. 1000 milliLiter(s) IV Continuous <Continuous>  timolol 0.5% Solution 1 Drop(s) Both EYES two times a day    MEDICATIONS  (PRN):  acetaminophen     Tablet .. 650 milliGRAM(s) Oral every 6 hours PRN Temp greater or equal to 38C (100.4F), Mild Pain (1 - 3)  aluminum hydroxide/magnesium hydroxide/simethicone Suspension 30 milliLiter(s) Oral every 4 hours PRN Dyspepsia  benzonatate 100 milliGRAM(s) Oral three times a day PRN Cough  bisacodyl 5 milliGRAM(s) Oral daily PRN Constipation  LORazepam     Tablet 0.5 milliGRAM(s) Oral every 12 hours PRN for anxiety  melatonin 3 milliGRAM(s) Oral at bedtime PRN Insomnia  ondansetron Injectable 4 milliGRAM(s) IV Push every 8 hours PRN Nausea and/or Vomiting  oxyCODONE    IR 2.5 milliGRAM(s) Oral every 4 hours PRN Moderate Pain (4 - 6)  oxyCODONE    IR 5 milliGRAM(s) Oral every 4 hours PRN Severe Pain (7 - 10)  pantoprazole    Tablet 40 milliGRAM(s) Oral before breakfast PRN gerd    ___________  Active diet:  Diet, DASH/TLC:   Sodium & Cholesterol Restricted  ___________________    ==================>> VITAL SIGNS <<==================    Weight (kg): 59  Vital Signs Last 24 HrsT(C): 36.6 (11-07-23 @ 10:54)  T(F): 97.9 (11-07-23 @ 10:54), Max: 99.5 (11-07-23 @ 00:01)  HR: 59 (11-07-23 @ 10:54) (51 - 75)  BP: 117/71 (11-07-23 @ 10:54)  RR: 18 (11-07-23 @ 10:54) (18 - 22)  SpO2: 98% (11-07-23 @ 10:54) (93% - 98%)       ==================>> LAB AND IMAGING <<==================                        9.5    8.73  )-----------( 166      ( 07 Nov 2023 07:08 )             29.6        11-07    140  |  105  |  22  ----------------------------<  98  4.4   |  26  |  0.94    Ca    8.8      07 Nov 2023 07:04  Phos  3.0     11-06  Mg     2.1     11-06    TPro  7.1  /  Alb  3.3  /  TBili  1.2  /  DBili  x   /  AST  43<H>  /  ALT  42  /  AlkPhos  125<H>  11-06    WBC count:   8.73 <<== ,  8.49 <<== ,  9.28 <<==   Hemoglobin:   9.5 <<==,  10.2 <<==,  10.7 <<==  platelets:  166 <==, 183 <==, 176 <==    Creatinine:  0.94  <<==, 0.91  <<==, 0.91  <<==  Sodium:   140  <==, 138  <==, 133  <==       AST:          43 <== , 61 <==      ALT:        42  <== , 44  <==      AP:        125  <=, 136  <=     Bili:        1.2  <=, 1.1  <=    ____________________________    M I C R O B I O L O G Y :    Culture - Urine (collected 05 Nov 2023 15:53)  Source: Clean Catch Clean Catch (Midstream)  Preliminary Report (06 Nov 2023 23:12):    >100,000 CFU/ml Escherichia coli    <10,000 CFU/ml Normal Urogenital simin present      ___________________________________________________________________________________  ===============>>  A S S E S S M E N T   A N D   P L A N <<===============  ------------------------------------------------------------------------------------------    · Assessment	  89yo 60kg f w pmh mci/dementia, HTN, HLD, pafib, constipation, gerd, p/w inability to ambulate following recent fall; in er, found to have right sup+inf pubic rami and right ischial tuberosity fractures a/w small hematoma over right anterolateral bladder wall; admit to medicine for further mgmt      Problem/Plan - 1:  ·  Problem: patient post fall with :      - R superior pubic ramus fx     - R inferior pubic ramus fx     - R ischium/ischial tuberosity fx     - R sacral ala age-indeterminate nondisplaced fracture     - Small pelvic hematoma  appreciated Ortho and  trauma follow up: no surgical intervention  WBAT / PT  pain management as needed   fall and fracture precautions  check vitamin D levels >> 35.8  ? DEXA scan outpatient vs just give Fosamax as outpatient    ** Urology appreciated re bladder injury and pelvic hematoma     no evidence of bladder perforatoin     possible TOV tomorrow :  parmar inserted in er : keep today      will give gentle IVH given dark urine+ UTI.     Problem/Plan - 2:  ·  Problem: Fall.     h/o mci/dementia       trauma work up as above    likely mechanical in nature   Monitor mental status with frequent neurochecks  maintain falll and fracture, delirium, seizure, aspiration precautions; keep head end of bed elevated  cont home risperidone qhs + prn ativan  pt/ot eval + sw/cm consult for disposition  patient is DNR per prior conversation    Problem/Plan - 3:  ·  Problem: PAF (paroxysmal atrial fibrillation).   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    Problem/Plan - 4:  ·  Problem: HTN / HLD  ·  Plan: cont home norvasc  recently discontinued off of hctz given hypona  recently was dc'd off toprol upon discharge from recent hospitalization but resumed on toprol by outpatient provider; hold for now given 1st degree avb and bradycardia.  statin    Problem/Plan - 5:  ·  Problem: UTI  patient with allergies to PCN and Quinolon..   will await sensitivities before treating unless febrile / symptomatic..     Problem/Plan - 6:  ·  Problem: GERD (gastroesophageal reflux disease).   ·  Plan: cont home prilosec prn => protonix.    Problem/Plan - 7:  ·  Problem: Constipation.   ·  Plan: cont home bowel regimen.    -GI/DVT Prophylaxis per protocol.    I called pt's son Rupert and updated on all findings and plan of care .. all questions answered. appreciative and agreeable to rehabilitation          --------------------------------------------  Case discussed with patient.. son..   Education given on findings and plan of care  ___________________________  H. GLENN Kelly.  Pager: [phone]

## 2023-11-08 NOTE — DIETITIAN INITIAL EVALUATION ADULT - OTHER INFO
Wt Hx:   Dosing wt 59 kG/130 lbs. RD obtained wt: 122 lbs  UBW ~120 lbs and denies changes in wt PTA  Ht: 60 inches   IBW: 100 lbs    IBW%: 122% (based on RD obtained wt)  Wt Hx per HIE (lbs): 117 (10/15/22), 121 (4/28/23), 120 (7/28/23), 114 (10/9/23)

## 2023-11-08 NOTE — DIETITIAN INITIAL EVALUATION ADULT - EDUCATION DIETARY MODIFICATIONS
RD encouraged adequate intake, pt declined to provide food preferences/nutrition supplements. Pt made aware RD to remain available./(2) meets goals/outcomes/verbalization

## 2023-11-08 NOTE — DIETITIAN INITIAL EVALUATION ADULT - REASON FOR ADMISSION
Fracture of unspecified parts of lumbosacral spine and pelvis, initial encounter for closed fracture

## 2023-11-08 NOTE — DIETITIAN INITIAL EVALUATION ADULT - PERTINENT MEDS FT
MEDICATIONS  (STANDING):  aMIOdarone    Tablet 200 milliGRAM(s) Oral daily  amLODIPine   Tablet 5 milliGRAM(s) Oral daily  apixaban 2.5 milliGRAM(s) Oral every 12 hours  atorvastatin 10 milliGRAM(s) Oral at bedtime  lidocaine   4% Patch 1 Patch Transdermal daily  naloxone Injectable 0.4 milliGRAM(s) IV Push once  polyethylene glycol 3350 17 Gram(s) Oral daily  risperiDONE   Tablet 0.5 milliGRAM(s) Oral at bedtime  senna 2 Tablet(s) Oral at bedtime  timolol 0.5% Solution 1 Drop(s) Both EYES two times a day    MEDICATIONS  (PRN):  acetaminophen     Tablet .. 650 milliGRAM(s) Oral every 6 hours PRN Temp greater or equal to 38C (100.4F), Mild Pain (1 - 3)  aluminum hydroxide/magnesium hydroxide/simethicone Suspension 30 milliLiter(s) Oral every 4 hours PRN Dyspepsia  benzonatate 100 milliGRAM(s) Oral three times a day PRN Cough  bisacodyl 5 milliGRAM(s) Oral daily PRN Constipation  LORazepam     Tablet 0.5 milliGRAM(s) Oral every 12 hours PRN for anxiety  melatonin 3 milliGRAM(s) Oral at bedtime PRN Insomnia  ondansetron Injectable 4 milliGRAM(s) IV Push every 8 hours PRN Nausea and/or Vomiting  oxyCODONE    IR 5 milliGRAM(s) Oral every 4 hours PRN Severe Pain (7 - 10)  oxyCODONE    IR 2.5 milliGRAM(s) Oral every 4 hours PRN Moderate Pain (4 - 6)  pantoprazole    Tablet 40 milliGRAM(s) Oral before breakfast PRN gerd

## 2023-11-08 NOTE — PROGRESS NOTE ADULT - ASSESSMENT
_________________________________________________________________________________________  ========>>  M E D I C A L   A T T E N D I N G    F O L L O W  U P  N O T E  <<=========  -----------------------------------------------------------------------------------------------------    - Patient seen and examined by me earlier today.   - In summary,  CONNER CELESTE is a 89y year old woman admitted post fall, pelvic fracture..   - Patient today overall doing ok, comfortable, denies having any pain..      poor appetite / not hungry..  but took some yogurt from me..     ==================>> REVIEW OF SYSTEM <<=================    limited as patient poor historian   denies any pain or discomfort     ==================>> PHYSICAL EXAM <<=================    GEN: A&O X 1 , NAD , comfortable, pleasant, calm in bed..   HEENT: NCAT, PERRL, MMM, hearing intact  CVS: S1S2 , regular , No M/R/G appreciated  PULM: CTA B/L,  no W/R/R appreciated.. limited exam as not taking deep breaths   ABD.: soft. non tender, non distended,  bowel sounds present  Extrem: intact pulses , no edema      + Parmar in place with dark urine         ( Note written / Date of service 11-08-23 ( This is certified to be the same as "ENTERED" date above ( for billing purposes)))    ==================>> MEDICATIONS <<====================    aMIOdarone    Tablet 200 milliGRAM(s) Oral daily  amLODIPine   Tablet 5 milliGRAM(s) Oral daily  apixaban 2.5 milliGRAM(s) Oral every 12 hours  atorvastatin 10 milliGRAM(s) Oral at bedtime  lidocaine   4% Patch 1 Patch Transdermal daily  naloxone Injectable 0.4 milliGRAM(s) IV Push once  polyethylene glycol 3350 17 Gram(s) Oral daily  risperiDONE   Tablet 0.5 milliGRAM(s) Oral at bedtime  senna 2 Tablet(s) Oral at bedtime  timolol 0.5% Solution 1 Drop(s) Both EYES two times a day    MEDICATIONS  (PRN):  acetaminophen     Tablet .. 650 milliGRAM(s) Oral every 6 hours PRN Temp greater or equal to 38C (100.4F), Mild Pain (1 - 3)  aluminum hydroxide/magnesium hydroxide/simethicone Suspension 30 milliLiter(s) Oral every 4 hours PRN Dyspepsia  benzonatate 100 milliGRAM(s) Oral three times a day PRN Cough  bisacodyl 5 milliGRAM(s) Oral daily PRN Constipation  LORazepam     Tablet 0.5 milliGRAM(s) Oral every 12 hours PRN for anxiety  melatonin 3 milliGRAM(s) Oral at bedtime PRN Insomnia  ondansetron Injectable 4 milliGRAM(s) IV Push every 8 hours PRN Nausea and/or Vomiting  oxyCODONE    IR 2.5 milliGRAM(s) Oral every 4 hours PRN Moderate Pain (4 - 6)  oxyCODONE    IR 5 milliGRAM(s) Oral every 4 hours PRN Severe Pain (7 - 10)  pantoprazole    Tablet 40 milliGRAM(s) Oral before breakfast PRN gerd    ___________  Active diet:  Diet, DASH/TLC:   Sodium & Cholesterol Restricted  ___________________    ==================>> VITAL SIGNS <<==================    Vital Signs Last 24 HrsT(C): 36.8 (11-08-23 @ 08:22)  T(F): 98.3 (11-08-23 @ 08:22), Max: 98.5 (11-08-23 @ 04:20)  HR: 69 (11-08-23 @ 08:22) (63 - 76)  BP: 121/70 (11-08-23 @ 08:22)  RR: 18 (11-08-23 @ 08:22) (16 - 18)  SpO2: 96% (11-08-23 @ 08:22) (91% - 96%)      CAPILLARY BLOOD GLUCOSE         ==================>> LAB AND IMAGING <<==================                        9.9    8.70  )-----------( 182      ( 08 Nov 2023 07:09 )             32.1        11-08    141  |  106  |  23  ----------------------------<  108<H>  3.9   |  24  |  0.92    Ca    8.6      08 Nov 2023 07:09      WBC count:   8.70 <<== ,  8.73 <<== ,  8.49 <<== ,  9.28 <<==   Hemoglobin:   9.9 <<==,  9.5 <<==,  10.2 <<==,  10.7 <<==  platelets:  182 <==, 166 <==, 183 <==, 176 <==    Creatinine:  0.92  <<==, 0.94  <<==, 0.91  <<==, 0.91  <<==  Sodium:   141  <==, 140  <==, 138  <==, 133  <==       AST:          43 <== , 61 <==      ALT:        42  <== , 44  <==      AP:        125  <=, 136  <=     Bili:        1.2  <=, 1.1  <=    ____________________________    M I C R O B I O L O G Y :    Culture - Urine (collected 05 Nov 2023 15:53)  Source: Clean Catch Clean Catch (Midstream)  Final Report (08 Nov 2023 10:40):    >100,000 CFU/ml Escherichia coli    <10,000 CFU/ml Normal Urogenital simin present  Organism: Escherichia coli (08 Nov 2023 10:40)  Organism: Escherichia coli (08 Nov 2023 10:40)    Sensitivities:      Method Type: ROSENDO      -  Amoxicillin/Clavulanic Acid: S <=8/4      -  Ampicillin: R >16 These ampicillin results predict results for amoxicillin      -  Ampicillin/Sulbactam: I 16/8      -  Aztreonam: S <=4      -  Cefazolin: S <=2 For uncomplicated UTI with K. pneumoniae, E. coli, or P. mirablis: ROSENDO <=16 is sensitive and ROSENDO >=32 is resistant. This also predicts results for oral agents cefaclor, cefdinir, cefpodoxime, cefprozil, cefuroxime axetil, cephalexin and locarbef for uncomplicated UTI. Note that some isolates may be susceptible to these agents while testing resistant to cefazolin.      -  Cefepime: S <=2      -  Cefoxitin: S <=8      -  Ceftriaxone: S <=1      -  Cefuroxime: S <=4      -  Ciprofloxacin: S <=0.25      -  Ertapenem: S <=0.5      -  Gentamicin: S <=2      -  Imipenem: S <=1      -  Levofloxacin: S <=0.5      -  Meropenem: S <=1      -  Nitrofurantoin: S <=32 Should not be used to treat pyelonephritis      -  Piperacillin/Tazobactam: S <=8      -  Tobramycin: S <=2      -  Trimethoprim/Sulfamethoxazole: R >2/38        SARS-CoV-2: Kenneth (11-07-23 @ 00:16)      ___________________________________________________________________________________  ===============>>  A S S E S S M E N T   A N D   P L A N <<===============  ------------------------------------------------------------------------------------------    · Assessment	  89yo 60kg f w pmh mci/dementia, HTN, HLD, pafib, constipation, gerd, p/w inability to ambulate following recent fall; in er, found to have right sup+inf pubic rami and right ischial tuberosity fractures a/w small hematoma over right anterolateral bladder wall; admit to medicine for further mgmt      Problem/Plan - 1:  ·  Problem: patient post fall with :      - R superior pubic ramus fx     - R inferior pubic ramus fx     - R ischium/ischial tuberosity fx     - R sacral ala age-indeterminate nondisplaced fracture     - Small pelvic hematoma  appreciated Ortho and  trauma follow up: no surgical intervention  WBAT / PT  pain management as needed   fall and fracture precautions  check vitamin D levels >> 35.8  ? DEXA scan outpatient vs just give Fosamax as outpatient    ** Urology appreciated re bladder injury and pelvic hematoma     no evidence of bladder perforatoin     possible TOV tomorrow :  parmar inserted in er : keep today      will give gentle IVH given dark urine+ UTI.     Problem/Plan - 2:  ·  Problem: Fall.     h/o mci/dementia       trauma work up as above    likely mechanical in nature   Monitor mental status with frequent neurochecks  maintain falll and fracture, delirium, seizure, aspiration precautions; keep head end of bed elevated  cont home risperidone qhs + prn ativan  pt/ot eval + sw/cm consult for disposition  patient is DNR per prior conversation    Problem/Plan - 3:  ·  Problem: PAF (paroxysmal atrial fibrillation).   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    Problem/Plan - 4:  ·  Problem: HTN / HLD  ·  Plan: cont home norvasc  recently discontinued off of hctz given hypona  recently was dc'd off toprol upon discharge from recent hospitalization but resumed on toprol by outpatient provider; hold for now given 1st degree avb and bradycardia.  statin    Problem/Plan - 5:  ·  Problem: UTI  patient with allergies to PCN and Quinolon..   will await sensitivities before treating unless febrile / symptomatic..     Problem/Plan - 6:  ·  Problem: GERD (gastroesophageal reflux disease).   ·  Plan: cont home prilosec prn => protonix.    Problem/Plan - 7:  ·  Problem: Constipation.   ·  Plan: cont home bowel regimen.    -GI/DVT Prophylaxis per protocol.    I called pt's son Rupert and updated on all findings and plan of care .. all questions answered. appreciative and agreeable to rehabilitation          --------------------------------------------  Case discussed with patient.. son..   Education given on findings and plan of care  ___________________________  H. GLENN Kelly.  Pager: [phone]       _________________________________________________________________________________________  ========>>  M E D I C A L   A T T E N D I N G    F O L L O W  U P  N O T E  <<=========  -----------------------------------------------------------------------------------------------------    - Patient seen and examined by me earlier today.   - Patient today overall doing ok, comfortable, denies having any pain..      appetite better / patient more alert and eating better     ==================>> REVIEW OF SYSTEM <<=================    limited as patient poor historian   denies any pain or discomfort     ==================>> PHYSICAL EXAM <<=================    GEN: A&O X 1 , NAD , comfortable, pleasant, calm in chair , eating..   HEENT: NCAT, PERRL, MMM, hearing intact  CVS: S1S2 , regular , No M/R/G appreciated  PULM: CTA B/L,  no W/R/R appreciated.. limited exam as not taking deep breaths   ABD.: soft. non tender, non distended,  bowel sounds present  Extrem: intact pulses , no edema      + Goodman in place with dark urine         ( Note written / Date of service 11-08-23 ( This is certified to be the same as "ENTERED" date above ( for billing purposes)))    ==================>> MEDICATIONS <<====================    aMIOdarone    Tablet 200 milliGRAM(s) Oral daily  amLODIPine   Tablet 5 milliGRAM(s) Oral daily  apixaban 2.5 milliGRAM(s) Oral every 12 hours  atorvastatin 10 milliGRAM(s) Oral at bedtime  lidocaine   4% Patch 1 Patch Transdermal daily  naloxone Injectable 0.4 milliGRAM(s) IV Push once  polyethylene glycol 3350 17 Gram(s) Oral daily  risperiDONE   Tablet 0.5 milliGRAM(s) Oral at bedtime  senna 2 Tablet(s) Oral at bedtime  timolol 0.5% Solution 1 Drop(s) Both EYES two times a day    MEDICATIONS  (PRN):  acetaminophen     Tablet .. 650 milliGRAM(s) Oral every 6 hours PRN Temp greater or equal to 38C (100.4F), Mild Pain (1 - 3)  aluminum hydroxide/magnesium hydroxide/simethicone Suspension 30 milliLiter(s) Oral every 4 hours PRN Dyspepsia  benzonatate 100 milliGRAM(s) Oral three times a day PRN Cough  bisacodyl 5 milliGRAM(s) Oral daily PRN Constipation  LORazepam     Tablet 0.5 milliGRAM(s) Oral every 12 hours PRN for anxiety  melatonin 3 milliGRAM(s) Oral at bedtime PRN Insomnia  ondansetron Injectable 4 milliGRAM(s) IV Push every 8 hours PRN Nausea and/or Vomiting  oxyCODONE    IR 2.5 milliGRAM(s) Oral every 4 hours PRN Moderate Pain (4 - 6)  oxyCODONE    IR 5 milliGRAM(s) Oral every 4 hours PRN Severe Pain (7 - 10)  pantoprazole    Tablet 40 milliGRAM(s) Oral before breakfast PRN gerd    ___________  Active diet:  Diet, DASH/TLC:   Sodium & Cholesterol Restricted  ___________________    ==================>> VITAL SIGNS <<==================    Vital Signs Last 24 HrsT(C): 36.8 (11-08-23 @ 08:22)  T(F): 98.3 (11-08-23 @ 08:22), Max: 98.5 (11-08-23 @ 04:20)  HR: 69 (11-08-23 @ 08:22) (63 - 76)  BP: 121/70 (11-08-23 @ 08:22)  RR: 18 (11-08-23 @ 08:22) (16 - 18)  SpO2: 96% (11-08-23 @ 08:22) (91% - 96%)       ==================>> LAB AND IMAGING <<==================                        9.9    8.70  )-----------( 182      ( 08 Nov 2023 07:09 )             32.1        11-08    141  |  106  |  23  ----------------------------<  108<H>  3.9   |  24  |  0.92    Ca    8.6      08 Nov 2023 07:09      WBC count:   8.70 <<== ,  8.73 <<== ,  8.49 <<== ,  9.28 <<==   Hemoglobin:   9.9 <<==,  9.5 <<==,  10.2 <<==,  10.7 <<==  platelets:  182 <==, 166 <==, 183 <==, 176 <==    Creatinine:  0.92  <<==, 0.94  <<==, 0.91  <<==, 0.91  <<==  Sodium:   141  <==, 140  <==, 138  <==, 133  <==       AST:          43 <== , 61 <==      ALT:        42  <== , 44  <==      AP:        125  <=, 136  <=     Bili:        1.2  <=, 1.1  <=    ____________________________    M I C R O B I O L O G Y :    Culture - Urine (collected 05 Nov 2023 15:53)  Source: Clean Catch Clean Catch (Midstream)  Final Report (08 Nov 2023 10:40):    >100,000 CFU/ml Escherichia coli    <10,000 CFU/ml Normal Urogenital simin present  Organism: Escherichia coli (08 Nov 2023 10:40)  Organism: Escherichia coli (08 Nov 2023 10:40)    Sensitivities:      Method Type: ROSENDO      -  Amoxicillin/Clavulanic Acid: S <=8/4      -  Ampicillin: R >16 These ampicillin results predict results for amoxicillin      -  Ampicillin/Sulbactam: I 16/8      -  Aztreonam: S <=4      -  Cefazolin: S <=2 For uncomplicated UTI with K. pneumoniae, E. coli, or P. mirablis: ROSENDO <=16 is sensitive and ROSENDO >=32 is resistant. This also predicts results for oral agents cefaclor, cefdinir, cefpodoxime, cefprozil, cefuroxime axetil, cephalexin and locarbef for uncomplicated UTI. Note that some isolates may be susceptible to these agents while testing resistant to cefazolin.      -  Cefepime: S <=2      -  Cefoxitin: S <=8      -  Ceftriaxone: S <=1      -  Cefuroxime: S <=4      -  Ciprofloxacin: S <=0.25      -  Ertapenem: S <=0.5      -  Gentamicin: S <=2      -  Imipenem: S <=1      -  Levofloxacin: S <=0.5      -  Meropenem: S <=1      -  Nitrofurantoin: S <=32 Should not be used to treat pyelonephritis      -  Piperacillin/Tazobactam: S <=8      -  Tobramycin: S <=2      -  Trimethoprim/Sulfamethoxazole: R >2/38      ___________________________________________________________________________________  ===============>>  A S S E S S M E N T   A N D   P L A N <<===============  ------------------------------------------------------------------------------------------    · Assessment	  89yo 60kg f w pmh mci/dementia, HTN, HLD, pafib, constipation, gerd, p/w inability to ambulate following recent fall; in er, found to have right sup+inf pubic rami and right ischial tuberosity fractures a/w small hematoma over right anterolateral bladder wall; admit to medicine for further mgmt      Problem/Plan - 1:  ·  Problem: patient post fall with :      - R superior pubic ramus fx     - R inferior pubic ramus fx     - R ischium/ischial tuberosity fx     - R sacral ala age-indeterminate nondisplaced fracture     - Small pelvic hematoma  appreciated Ortho and  trauma follow up: no surgical intervention  WBAT / PT  pain management as needed   fall and fracture precautions  vitamin D levels >> 35.8  ? DEXA scan outpatient vs just give Fosamax as outpatient    ** Urology appreciated re bladder injury and pelvic hematoma     no evidence of bladder perforation     TOV today    Problem/Plan - 2:  ·  Problem: Fall.     h/o mci/dementia       trauma work up as above    likely mechanical in nature   Monitor mental status with frequent neurochecks  maintain falll and fracture, delirium, seizure, aspiration precautions; keep head end of bed elevated  cont home risperidone qhs + prn ativan  pt/ot eval + sw/cm consult for disposition  patient is DNR per prior conversation    Problem/Plan - 3:  ·  Problem: PAF (paroxysmal atrial fibrillation).   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    Problem/Plan - 4:  ·  Problem: HTN / HLD  ·  Plan: cont home norvasc  recently discontinued off of hctz given hypona  recently was dc'd off toprol upon discharge from recent hospitalization but resumed on toprol by outpatient provider; hold for now given 1st degree avb and bradycardia.  statin    Problem/Plan - 5:  ·  Problem: UTI  patient with allergies to PCN and Quinolon..   >>3 days of macribid ordered     Problem/Plan - 6:  ·  Problem: GERD (gastroesophageal reflux disease).   ·  Plan: cont home prilosec prn => protonix.    Problem/Plan - 7:  ·  Problem: Constipation.   ·  Plan: cont home bowel regimen.    -GI/DVT Prophylaxis per protocol.    DC planing to rehabilitation in process          --------------------------------------------  Case discussed with patient..   ___________________________  H. GLENN Kelly.  Pager: [phone]

## 2023-11-08 NOTE — DIETITIAN INITIAL EVALUATION ADULT - REASON INDICATOR FOR ASSESSMENT
Consult for MST Score 2 or >  Source: Medical record and pt (noted with Hx of dementia, however answered all questions appropriately)

## 2023-11-08 NOTE — DIETITIAN INITIAL EVALUATION ADULT - ORAL INTAKE PTA/DIET HISTORY
Pt was eating well with no changes in appetite. Pt was not following therapeutic diet. Denies micronutrient or oral nutrient supplement use. Denies Hx of chewing or swallowing issues. Latex allergy noted; confirms no known food allergies.

## 2023-11-08 NOTE — DIETITIAN INITIAL EVALUATION ADULT - PERTINENT LABORATORY DATA
11-08    141  |  106  |  23  ----------------------------<  108<H>  3.9   |  24  |  0.92    Ca    8.6      08 Nov 2023 07:09    A1C with Estimated Average Glucose Result: 5.5 % (11-06-23 @ 08:00)

## 2023-11-08 NOTE — PROGRESS NOTE ADULT - SUBJECTIVE AND OBJECTIVE BOX
Subjective  Patient seen and examined. Resting comfortably    Objective    Vital signs  T(F): , Max: 98.5 (11-08-23 @ 04:20)  HR: 63 (11-08-23 @ 04:20)  BP: 121/68 (11-08-23 @ 04:20)  SpO2: 94% (11-08-23 @ 04:20)  Wt(kg): --    Output     OUT:    Indwelling Catheter - Urethral (mL): 750 mL  Total OUT: 750 mL    Total NET: -750 mL      Gen: NAD  Abd: soft, nontender, nondistended, no suprapubic tenderness  : parmar secured in place, draining yellow urine    Labs      11-07 @ 07:08    WBC 8.73  / Hct 29.6  / SCr --       11-07 @ 07:04    WBC --    / Hct --    / SCr 0.94         Culture - Urine (collected 11-05-23 @ 15:53)  Source: Clean Catch Clean Catch (Midstream)  Preliminary Report (11-06-23 @ 23:12):    >100,000 CFU/ml Escherichia coli    <10,000 CFU/ml Normal Urogenital simin present        Urine Cx: ?  Blood Cx: ?    Imaging

## 2023-11-08 NOTE — PROGRESS NOTE ADULT - ASSESSMENT
89yo F presenting after fall from home with pelvic fractures in the setting of a small hematoma adjacent to the bladder. Reviewing the images and given the urine was clear yellow have low suspicion for perforation but hematoma could be secondary to fracture in pelvis vs bladder contusion. CT cystogram showed no signs of bladder perforation since no extravasation of contrast was noted    Recommendations  - CT cystogram to evaluate for bladder perforation - showing no signs of perforation  - Can give patient TOV today  - Please reconsult urology as needed. No need for urological outpatient follow up given no perforation  - Rest of care per primary  - Discussed with Dr. Hodan [facility]  [address] [place], [place] [address] [phone]

## 2023-11-09 NOTE — PROGRESS NOTE ADULT - ASSESSMENT
_________________________________________________________________________________________  ========>>  M E D I C A L   A T T E N D I N G    F O L L O W  U P  N O T E  <<=========  -----------------------------------------------------------------------------------------------------    - Patient seen and examined by me earlier today.   - Patient today overall doing ok, comfortable, denies having any pain..      no major events reported / noted otherwise     ==================>> REVIEW OF SYSTEM <<=================    limited as patient poor historian   denies any pain or discomfort     ==================>> PHYSICAL EXAM <<=================    GEN: A&O X 1 , NAD , comfortable, pleasant, calm .. resting in bed   HEENT: NCAT, PERRL, MMM, hearing intact  CVS: S1S2 , regular , No M/R/G appreciated  PULM: CTA B/L,  no W/R/R appreciated.. limited exam as not taking deep breaths   ABD.: soft. non tender, non distended,  bowel sounds present  Extrem: intact pulses , no edema        ( Note written / Date of service 11-09-23 ( This is certified to be the same as "ENTERED" date above ( for billing purposes)))    ==================>> MEDICATIONS <<====================    aMIOdarone    Tablet 200 milliGRAM(s) Oral daily  amLODIPine   Tablet 5 milliGRAM(s) Oral daily  apixaban 2.5 milliGRAM(s) Oral every 12 hours  atorvastatin 10 milliGRAM(s) Oral at bedtime  lidocaine   4% Patch 1 Patch Transdermal daily  naloxone Injectable 0.4 milliGRAM(s) IV Push once  nitrofurantoin monohydrate/macrocrystals (MACROBID) 100 milliGRAM(s) Oral two times a day  polyethylene glycol 3350 17 Gram(s) Oral daily  risperiDONE   Tablet 0.5 milliGRAM(s) Oral at bedtime  senna 2 Tablet(s) Oral at bedtime  timolol 0.5% Solution 1 Drop(s) Both EYES two times a day    MEDICATIONS  (PRN):  acetaminophen     Tablet .. 650 milliGRAM(s) Oral every 6 hours PRN Temp greater or equal to 38C (100.4F), Mild Pain (1 - 3)  aluminum hydroxide/magnesium hydroxide/simethicone Suspension 30 milliLiter(s) Oral every 4 hours PRN Dyspepsia  benzonatate 100 milliGRAM(s) Oral three times a day PRN Cough  bisacodyl 5 milliGRAM(s) Oral daily PRN Constipation  LORazepam     Tablet 0.5 milliGRAM(s) Oral every 12 hours PRN for anxiety  melatonin 3 milliGRAM(s) Oral at bedtime PRN Insomnia  ondansetron Injectable 4 milliGRAM(s) IV Push every 8 hours PRN Nausea and/or Vomiting  oxyCODONE    IR 5 milliGRAM(s) Oral every 4 hours PRN Severe Pain (7 - 10)  oxyCODONE    IR 2.5 milliGRAM(s) Oral every 4 hours PRN Moderate Pain (4 - 6)  pantoprazole    Tablet 40 milliGRAM(s) Oral before breakfast PRN gerd    ___________  Active diet:  Diet, Regular  ___________________    ==================>> VITAL SIGNS <<==================    Vital Signs Last 24 HrsT(C): 36.5 (11-09-23 @ 08:43)  T(F): 97.7 (11-09-23 @ 08:43), Max: 98.6 (11-09-23 @ 00:00)  HR: 74 (11-09-23 @ 08:43) (69 - 75)  BP: 154/77 (11-09-23 @ 08:43)  RR: 18 (11-09-23 @ 08:43) (15 - 18)  SpO2: 93% (11-09-23 @ 08:43) (93% - 97%)       ==================>> LAB AND IMAGING <<==================                        9.1    8.91  )-----------( 182      ( 09 Nov 2023 07:00 )             28.8        11-09    136  |  104  |  25<H>  ----------------------------<  110<H>  4.0   |  24  |  0.93    Ca    8.6      09 Nov 2023 07:01      WBC count:   8.91 <<== ,  8.70 <<== ,  8.73 <<== ,  8.49 <<== ,  9.28 <<==   Hemoglobin:   9.1 <<==,  9.9 <<==,  9.5 <<==,  10.2 <<==,  10.7 <<==  platelets:  182 <==, 182 <==, 166 <==, 183 <==, 176 <==    Creatinine:  0.93  <<==, 0.92  <<==, 0.94  <<==, 0.91  <<==, 0.91  <<==  Sodium:   136  <==, 141  <==, 140  <==, 138  <==, 133  <==       AST:          43 <== , 61 <==      ALT:        42  <== , 44  <==      AP:        125  <=, 136  <=     Bili:        1.2  <=, 1.1  <=    ____________________________    M I C R O B I O L O G Y :    Culture - Urine (collected 05 Nov 2023 15:53)  Source: Clean Catch Clean Catch (Midstream)  Final Report (08 Nov 2023 10:40):    >100,000 CFU/ml Escherichia coli    <10,000 CFU/ml Normal Urogenital simin present  Organism: Escherichia coli (08 Nov 2023 10:40)  Organism: Escherichia coli (08 Nov 2023 10:40)    Sensitivities:      -  Levofloxacin: S <=0.5      -  Tobramycin: S <=2      -  Nitrofurantoin: S <=32 Should not be used to treat pyelonephritis      -  Aztreonam: S <=4      -  Gentamicin: S <=2      -  Cefazolin: S <=2 For uncomplicated UTI with K. pneumoniae, E. coli, or P. mirablis: ROSENDO <=16 is sensitive and ROSENDO >=32 is resistant. This also predicts results for oral agents cefaclor, cefdinir, cefpodoxime, cefprozil, cefuroxime axetil, cephalexin and locarbef for uncomplicated UTI. Note that some isolates may be susceptible to these agents while testing resistant to cefazolin.      -  Cefepime: S <=2      -  Piperacillin/Tazobactam: S <=8      -  Ciprofloxacin: S <=0.25      -  Imipenem: S <=1      -  Ceftriaxone: S <=1      -  Ampicillin: R >16 These ampicillin results predict results for amoxicillin      Method Type: ROSENDO      -  Meropenem: S <=1      -  Ampicillin/Sulbactam: I 16/8      -  Cefoxitin: S <=8      -  Cefuroxime: S <=4      -  Amoxicillin/Clavulanic Acid: S <=8/4      -  Trimethoprim/Sulfamethoxazole: R >2/38      -  Ertapenem: S <=0.5      ___________________________________________________________________________________  ===============>>  A S S E S S M E N T   A N D   P L A N <<===============  ------------------------------------------------------------------------------------------    · Assessment	  89yo 60kg f w pmh mci/dementia, HTN, HLD, pafib, constipation, gerd, p/w inability to ambulate following recent fall; in er, found to have right sup+inf pubic rami and right ischial tuberosity fractures a/w small hematoma over right anterolateral bladder wall; admit to medicine for further mgmt      Problem/Plan - 1:  ·  Problem: patient post fall with :      - R superior pubic ramus fx     - R inferior pubic ramus fx     - R ischium/ischial tuberosity fx     - R sacral ala age-indeterminate nondisplaced fracture     - Small pelvic hematoma  appreciated Ortho and  trauma follow up: no surgical intervention  WBAT / PT  pain management as needed   fall and fracture precautions  vitamin D levels >> 35.8  ? DEXA scan outpatient vs just give Fosamax as outpatient    ** Urology appreciated re bladder injury and pelvic hematoma     no evidence of bladder perforation     TOV > monitor     Problem/Plan - 2:  ·  Problem: Fall.     h/o mci/dementia       trauma work up as above    likely mechanical in nature   Monitor mental status with frequent neurochecks  maintain falll and fracture, delirium, seizure, aspiration precautions; keep head end of bed elevated  cont home risperidone qhs + prn ativan  pt/ot eval + sw/cm consult for disposition  patient is DNR per prior conversation    Problem/Plan - 3:  ·  Problem: PAF (paroxysmal atrial fibrillation).   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    Problem/Plan - 4:  ·  Problem: HTN / HLD  ·  Plan: cont home norvasc  recently discontinued off of hctz given hypona  recently was dc'd off toprol upon discharge from recent hospitalization but resumed on toprol by outpatient provider; hold for now given 1st degree avb and bradycardia.  statin    Problem/Plan - 5:  ·  Problem: UTI  patient with allergies to PCN and Quinolon..   >>3 days of macribid ordered     Problem/Plan - 6:  ·  Problem: GERD (gastroesophageal reflux disease).   ·  Plan: cont home prilosec prn => protonix.    Problem/Plan - 7:  ·  Problem: Constipation.   ·  Plan: cont home bowel regimen.    -GI/DVT Prophylaxis per protocol.    DC planing to rehabilitation in process            ___________________________  H. GLENN Kelly.  Pager: [phone]

## 2023-11-10 NOTE — DISCHARGE NOTE PROVIDER - PROVIDER TOKENS
PROVIDER:[TOKEN:[[id number]],FOLLOWUP:[2 weeks]],PROVIDER:[TOKEN:[[id number]],FOLLOWUP:[2 weeks]]

## 2023-11-10 NOTE — DISCHARGE NOTE PROVIDER - CARE PROVIDERS DIRECT ADDRESSES
[email or web address]

## 2023-11-10 NOTE — DISCHARGE NOTE PROVIDER - CARE PROVIDER_API CALL
Laura Peterson  Internal Medicine  [address], [address]  [place] [address]  Phone: [phone]  Fax: [phone]  Follow Up Time: 2 weeks    Desean Burton  Orthopaedic Surgery  [address]  [place] [address]  Phone: [phone]  Fax: [phone]  Follow Up Time: 2 weeks

## 2023-11-10 NOTE — DISCHARGE NOTE PROVIDER - NSDCCAREPROVSEEN_GEN_ALL_CORE_FT
Agustín Kelly Baruch Hoorbod Delshadfar  Hoorbod Delshadfar  Hoorbod Delshadfar  Tamar Ko  Ordering Physician  Team [facility]  [facility]      [ Greater than 35 min spent for discharge services.   MIGUEL Kelly ]       ( Note written / Date of service is the same as last day of patient stay  in the hospital ( for billing purposes)))

## 2023-11-10 NOTE — DISCHARGE NOTE PROVIDER - NSDCFUSCHEDAPPT_GEN_ALL_CORE_FT
Detail Level: Detailed Additional Notes (Optional): Patient to discontinue wound care at this time Hide Additional Notes?: No [facility]  [facility] [address]  Scheduled Appointment: 01/26/2024

## 2023-11-10 NOTE — DISCHARGE NOTE PROVIDER - HOSPITAL COURSE
HPI:  89yo 60kg f w pmh mci/dementia, HTN, HLD, pafib, constipation, gerd, p/w inability to ambulate following recent fall; unwitnessed, with head strike, while on antiplt and ac, no associated loc. patient is a poor historian but reportedly, according to hha, patient had fallen ~4 days ago. since then, patient has been unable to walk. at baseline, she walks with walker. family grew concerned so had patient brought ot [facility] er for further evaluation.    in er, found to have right sup+inf pubic rami and right ischial tuberosity fractures a/w small hematoma over right anterolateral bladder wall; admit to medicine for further mgmt (05 Nov 2023 21:58)    Hospital Course:  Pateint admitted s/p fall and with inability to ambulate.  Imaging revealed Right superior, inferious pubic ramus fracture, Right ischium/ischial tuberosity fracture, and right sacral ala age indeterminate nondisplaced fracture.  Also with small pelvic hematoma. Orthopedic surgery was consulted.  No acute surgical interventions were recommended.  Patient may bear weight as needed, pain management was provided.  Follow up with Dr Burton outpatient. Urology consulted for concerns of bladder injury given pelvic hematoma.  There was no evidence of bladder perforation.  No acute interventions were recommended.  Fall deemed to likely be mechanical in nature given history of dementia.     Incidentally noted to have UTI, treated with a 3 day course of macrobid.     Important Medication Changes and Reason:    Active or Pending Issues Requiring Follow-up:  Follow up with Dr Burton for orthopedics    Advanced Directives:   [ ] Full code  [x] DNR  [ ] Hospice    Discharge Diagnoses:  Pubic ramus fracture  UTI  Afib  Hypertension       HPI:  89yo 60kg f w pmh mci/dementia, HTN, HLD, pafib, constipation, gerd, p/w inability to ambulate following recent fall; unwitnessed, with head strike, while on antiplt and ac, no associated loc. patient is a poor historian but reportedly, according to hha, patient had fallen ~4 days ago. since then, patient has been unable to walk. at baseline, she walks with walker. family grew concerned so had patient brought ot [facility] er for further evaluation.    in er, found to have right sup+inf pubic rami and right ischial tuberosity fractures a/w small hematoma over right anterolateral bladder wall; admit to medicine for further mgmt (05 Nov 2023 21:58)    Hospital Course:  Patient admitted s/p fall and with inability to ambulate.  Imaging revealed Right superior, inferious pubic ramus fracture, Right ischium/ischial tuberosity fracture, and right sacral ala age indeterminate nondisplaced fracture.  Also with small pelvic hematoma. Orthopedic surgery was consulted.  No acute surgical interventions were recommended.  Patient may bear weight as needed, pain management was provided.  Follow up with Dr Burton outpatient. Urology consulted for concerns of bladder injury given pelvic hematoma.  There was no evidence of bladder perforation.  No acute interventions were recommended.  Fall deemed to likely be mechanical in nature given history of dementia.     Incidentally noted to have UTI, treated with a 3 day course of macrobid.     Important Medication Changes and Reason:    Active or Pending Issues Requiring Follow-up:  Follow up with Dr Burton for orthopedics    Advanced Directives:   [ ] Full code  [x] DNR  [ ] Hospice    Discharge Diagnoses:  Pubic ramus fracture  UTI  Afib  Hypertension

## 2023-11-10 NOTE — PROGRESS NOTE ADULT - ASSESSMENT
_________________________________________________________________________________________  ========>>  M E D I C A L   A T T E N D I N G    F O L L O W  U P  N O T E  <<=========  -----------------------------------------------------------------------------------------------------    - Patient seen and examined by me earlier today.   - Patient today overall doing ok, comfortable, denies having any pain..      no major events reported / noted otherwise     ==================>> REVIEW OF SYSTEM <<=================    limited as patient poor historian   denies any pain or discomfort     ==================>> PHYSICAL EXAM <<=================    GEN: A&O X 1 , NAD , comfortable, pleasant, calm .. resting in bed   HEENT: NCAT, PERRL, MMM, hearing intact  CVS: S1S2 , regular , No M/R/G appreciated  PULM: CTA B/L,  no W/R/R appreciated.. limited exam as not taking deep breaths   ABD.: soft. non tender, non distended,  bowel sounds present  Extrem: intact pulses , no edema        ( Note written / Date of service 11-10-23 ( This is certified to be the same as "ENTERED" date above ( for billing purposes)))    ==================>> MEDICATIONS <<====================    aMIOdarone    Tablet 200 milliGRAM(s) Oral daily  amLODIPine   Tablet 5 milliGRAM(s) Oral daily  apixaban 2.5 milliGRAM(s) Oral every 12 hours  atorvastatin 10 milliGRAM(s) Oral at bedtime  lidocaine   4% Patch 1 Patch Transdermal daily  naloxone Injectable 0.4 milliGRAM(s) IV Push once  nitrofurantoin monohydrate/macrocrystals (MACROBID) 100 milliGRAM(s) Oral two times a day  polyethylene glycol 3350 17 Gram(s) Oral daily  risperiDONE   Tablet 0.5 milliGRAM(s) Oral at bedtime  senna 2 Tablet(s) Oral at bedtime  timolol 0.5% Solution 1 Drop(s) Both EYES two times a day    MEDICATIONS  (PRN):  acetaminophen     Tablet .. 650 milliGRAM(s) Oral every 6 hours PRN Temp greater or equal to 38C (100.4F), Mild Pain (1 - 3)  aluminum hydroxide/magnesium hydroxide/simethicone Suspension 30 milliLiter(s) Oral every 4 hours PRN Dyspepsia  benzonatate 100 milliGRAM(s) Oral three times a day PRN Cough  bisacodyl 5 milliGRAM(s) Oral daily PRN Constipation  LORazepam     Tablet 0.5 milliGRAM(s) Oral every 12 hours PRN for anxiety  melatonin 3 milliGRAM(s) Oral at bedtime PRN Insomnia  ondansetron Injectable 4 milliGRAM(s) IV Push every 8 hours PRN Nausea and/or Vomiting  oxyCODONE    IR 5 milliGRAM(s) Oral every 4 hours PRN Severe Pain (7 - 10)  oxyCODONE    IR 2.5 milliGRAM(s) Oral every 4 hours PRN Moderate Pain (4 - 6)  pantoprazole    Tablet 40 milliGRAM(s) Oral before breakfast PRN gerd    ___________  Active diet:  Diet, Regular  ___________________    ==================>> VITAL SIGNS <<==================    Vital Signs Last 24 HrsT(C): 36.5 (11-10-23 @ 13:40)  T(F): 97.7 (11-10-23 @ 13:40), Max: 98.7 (11-09-23 @ 16:00)  HR: 67 (11-10-23 @ 13:40) (62 - 77)  BP: 111/70 (11-10-23 @ 13:40)  RR: 18 (11-10-23 @ 13:40) (18 - 18)  SpO2: 93% (11-10-23 @ 13:40) (90% - 95%)      CAPILLARY BLOOD GLUCOSE         ==================>> LAB AND IMAGING <<==================                        9.7    9.52  )-----------( 216      ( 10 Nov 2023 06:47 )             30.1        11-09    136  |  104  |  25<H>  ----------------------------<  110<H>  4.0   |  24  |  0.93    Ca    8.6      09 Nov 2023 07:01      WBC count:   9.52 <<== ,  8.91 <<== ,  8.70 <<== ,  8.73 <<== ,  8.49 <<==   Hemoglobin:   9.7 <<==,  9.1 <<==,  9.9 <<==,  9.5 <<==,  10.2 <<==  platelets:  216 <==, 182 <==, 182 <==, 166 <==, 183 <==, 176 <==    Creatinine:  0.93  <<==, 0.92  <<==, 0.94  <<==, 0.91  <<==, 0.91  <<==  Sodium:   136  <==, 141  <==, 140  <==, 138  <==, 133  <==       AST:          43 <== , 61 <==      ALT:        42  <== , 44  <==      AP:        125  <=, 136  <=     Bili:        1.2  <=, 1.1  <=    ____________________________    M I C R O B I O L O G Y :    Culture - Urine (collected 05 Nov 2023 15:53)  Source: Clean Catch Clean Catch (Midstream)  Final Report (08 Nov 2023 10:40):    >100,000 CFU/ml Escherichia coli    <10,000 CFU/ml Normal Urogenital simin present  Organism: Escherichia coli (08 Nov 2023 10:40)  Organism: Escherichia coli (08 Nov 2023 10:40)    Sensitivities:      Method Type: ROSENDO      -  Amoxicillin/Clavulanic Acid: S <=8/4      -  Ampicillin: R >16 These ampicillin results predict results for amoxicillin      -  Ampicillin/Sulbactam: I 16/8      -  Aztreonam: S <=4      -  Cefazolin: S <=2 For uncomplicated UTI with K. pneumoniae, E. coli, or P. mirablis: ROSENDO <=16 is sensitive and ROSENDO >=32 is resistant. This also predicts results for oral agents cefaclor, cefdinir, cefpodoxime, cefprozil, cefuroxime axetil, cephalexin and locarbef for uncomplicated UTI. Note that some isolates may be susceptible to these agents while testing resistant to cefazolin.      -  Cefepime: S <=2      -  Cefoxitin: S <=8      -  Ceftriaxone: S <=1      -  Cefuroxime: S <=4      -  Ciprofloxacin: S <=0.25      -  Ertapenem: S <=0.5      -  Gentamicin: S <=2      -  Imipenem: S <=1      -  Levofloxacin: S <=0.5      -  Meropenem: S <=1      -  Nitrofurantoin: S <=32 Should not be used to treat pyelonephritis      -  Piperacillin/Tazobactam: S <=8      -  Tobramycin: S <=2      -  Trimethoprim/Sulfamethoxazole: R >2/38        SARS-CoV-2: Kenneth (11-07-23 @ 00:16)    ___________________________________________________________________________________  ===============>>  A S S E S S M E N T   A N D   P L A N <<===============  ------------------------------------------------------------------------------------------    · Assessment	  89yo 60kg f w pmh mci/dementia, HTN, HLD, pafib, constipation, gerd, p/w inability to ambulate following recent fall; in er, found to have right sup+inf pubic rami and right ischial tuberosity fractures a/w small hematoma over right anterolateral bladder wall; admit to medicine for further mgmt      Problem/Plan - 1:  ·  Problem: patient post fall with :      - R superior pubic ramus fx     - R inferior pubic ramus fx     - R ischium/ischial tuberosity fx     - R sacral ala age-indeterminate nondisplaced fracture     - Small pelvic hematoma  appreciated Ortho and  trauma follow up: no surgical intervention  WBAT / PT  pain management as needed   fall and fracture precautions  vitamin D levels >> 35.8  ? DEXA scan outpatient vs just give Fosamax as outpatient    ** Urology appreciated re bladder injury and pelvic hematoma     no evidence of bladder perforation     TOV > monitor     Problem/Plan - 2:  ·  Problem: Fall.     h/o mci/dementia       trauma work up as above    likely mechanical in nature   Monitor mental status with frequent neurochecks  maintain falll and fracture, delirium, seizure, aspiration precautions; keep head end of bed elevated  cont home risperidone qhs + prn ativan  pt/ot eval + sw/cm consult for disposition  patient is DNR per prior conversation    Problem/Plan - 3:  ·  Problem: PAF (paroxysmal atrial fibrillation).   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    Problem/Plan - 4:  ·  Problem: HTN / HLD  ·  Plan: cont home norvasc  recently discontinued off of hctz given hypona  recently was dc'd off toprol upon discharge from recent hospitalization but resumed on toprol by outpatient provider; hold for now given 1st degree avb and bradycardia.  statin    Problem/Plan - 5:  ·  Problem: UTI  patient with allergies to PCN and Quinolon..   >>3 days of macribid ordered     Problem/Plan - 6:  ·  Problem: GERD (gastroesophageal reflux disease).   ·  Plan: cont home prilosec prn => protonix.    Problem/Plan - 7:  ·  Problem: Constipation.   ·  Plan: cont home bowel regimen.    -GI/DVT Prophylaxis per protocol.    BERNARD planing to rehabilitation in process            ___________________________  H. GLENN Kelly.  Pager: [phone]       _________________________________________________________________________________________  ========>>  M E D I C A L   A T T E N D I N G    F O L L O W  U P  N O T E  <<=========  -----------------------------------------------------------------------------------------------------    - Patient seen and examined by me earlier today.   - Patient today overall doing ok, comfortable, denies having any pain..      no major events reported / noted otherwise       As per nursing staff, patient ate breakfast, another was doing well    ==================>> REVIEW OF SYSTEM <<=================    limited as patient poor historian   denies any pain or discomfort     ==================>> PHYSICAL EXAM <<=================    GEN: A&O X 1 , NAD , comfortable, pleasant, calm .. resting in bed   HEENT: NCAT, PERRL, MMM, hearing intact  CVS: S1S2 , regular , No M/R/G appreciated  PULM: CTA B/L,  no W/R/R appreciated.. limited exam as not taking deep breaths   ABD.: soft. non tender, non distended,  bowel sounds present  Extrem: intact pulses , no edema        ( Note written / Date of service 11-10-23 ( This is certified to be the same as "ENTERED" date above ( for billing purposes)))    ==================>> MEDICATIONS <<====================    aMIOdarone    Tablet 200 milliGRAM(s) Oral daily  amLODIPine   Tablet 5 milliGRAM(s) Oral daily  apixaban 2.5 milliGRAM(s) Oral every 12 hours  atorvastatin 10 milliGRAM(s) Oral at bedtime  lidocaine   4% Patch 1 Patch Transdermal daily  naloxone Injectable 0.4 milliGRAM(s) IV Push once  nitrofurantoin monohydrate/macrocrystals (MACROBID) 100 milliGRAM(s) Oral two times a day  polyethylene glycol 3350 17 Gram(s) Oral daily  risperiDONE   Tablet 0.5 milliGRAM(s) Oral at bedtime  senna 2 Tablet(s) Oral at bedtime  timolol 0.5% Solution 1 Drop(s) Both EYES two times a day    MEDICATIONS  (PRN):  acetaminophen     Tablet .. 650 milliGRAM(s) Oral every 6 hours PRN Temp greater or equal to 38C (100.4F), Mild Pain (1 - 3)  aluminum hydroxide/magnesium hydroxide/simethicone Suspension 30 milliLiter(s) Oral every 4 hours PRN Dyspepsia  benzonatate 100 milliGRAM(s) Oral three times a day PRN Cough  bisacodyl 5 milliGRAM(s) Oral daily PRN Constipation  LORazepam     Tablet 0.5 milliGRAM(s) Oral every 12 hours PRN for anxiety  melatonin 3 milliGRAM(s) Oral at bedtime PRN Insomnia  ondansetron Injectable 4 milliGRAM(s) IV Push every 8 hours PRN Nausea and/or Vomiting  oxyCODONE    IR 5 milliGRAM(s) Oral every 4 hours PRN Severe Pain (7 - 10)  oxyCODONE    IR 2.5 milliGRAM(s) Oral every 4 hours PRN Moderate Pain (4 - 6)  pantoprazole    Tablet 40 milliGRAM(s) Oral before breakfast PRN gerd    ___________  Active diet:  Diet, Regular  ___________________    ==================>> VITAL SIGNS <<==================    Vital Signs Last 24 HrsT(C): 36.5 (11-10-23 @ 13:40)  T(F): 97.7 (11-10-23 @ 13:40), Max: 98.7 (11-09-23 @ 16:00)  HR: 67 (11-10-23 @ 13:40) (62 - 77)  BP: 111/70 (11-10-23 @ 13:40)  RR: 18 (11-10-23 @ 13:40) (18 - 18)  SpO2: 93% (11-10-23 @ 13:40) (90% - 95%)       ==================>> LAB AND IMAGING <<==================                        9.7    9.52  )-----------( 216      ( 10 Nov 2023 06:47 )             30.1        11-09    136  |  104  |  25<H>  ----------------------------<  110<H>  4.0   |  24  |  0.93    Ca    8.6      09 Nov 2023 07:01      WBC count:   9.52 <<== ,  8.91 <<== ,  8.70 <<== ,  8.73 <<== ,  8.49 <<==   Hemoglobin:   9.7 <<==,  9.1 <<==,  9.9 <<==,  9.5 <<==,  10.2 <<==  platelets:  216 <==, 182 <==, 182 <==, 166 <==, 183 <==, 176 <==    Creatinine:  0.93  <<==, 0.92  <<==, 0.94  <<==, 0.91  <<==, 0.91  <<==  Sodium:   136  <==, 141  <==, 140  <==, 138  <==, 133  <==       AST:          43 <== , 61 <==      ALT:        42  <== , 44  <==      AP:        125  <=, 136  <=     Bili:        1.2  <=, 1.1  <=    ____________________________    M I C R O B I O L O G Y :    Culture - Urine (collected 05 Nov 2023 15:53)  Source: Clean Catch Clean Catch (Midstream)  Final Report (08 Nov 2023 10:40):    >100,000 CFU/ml Escherichia coli    <10,000 CFU/ml Normal Urogenital simin present  Organism: Escherichia coli (08 Nov 2023 10:40)  Organism: Escherichia coli (08 Nov 2023 10:40)    Sensitivities:      Method Type: ROSENDO      -  Amoxicillin/Clavulanic Acid: S <=8/4      -  Ampicillin: R >16 These ampicillin results predict results for amoxicillin      -  Ampicillin/Sulbactam: I 16/8      -  Aztreonam: S <=4      -  Cefazolin: S <=2 For uncomplicated UTI with K. pneumoniae, E. coli, or P. mirablis: ROSENDO <=16 is sensitive and ROSENDO >=32 is resistant. This also predicts results for oral agents cefaclor, cefdinir, cefpodoxime, cefprozil, cefuroxime axetil, cephalexin and locarbef for uncomplicated UTI. Note that some isolates may be susceptible to these agents while testing resistant to cefazolin.      -  Cefepime: S <=2      -  Cefoxitin: S <=8      -  Ceftriaxone: S <=1      -  Cefuroxime: S <=4      -  Ciprofloxacin: S <=0.25      -  Ertapenem: S <=0.5      -  Gentamicin: S <=2      -  Imipenem: S <=1      -  Levofloxacin: S <=0.5      -  Meropenem: S <=1      -  Nitrofurantoin: S <=32 Should not be used to treat pyelonephritis      -  Piperacillin/Tazobactam: S <=8      -  Tobramycin: S <=2      -  Trimethoprim/Sulfamethoxazole: R >2/38    SARS-CoV-2: Giulianotec (11-07-23 @ 00:16)    ___________________________________________________________________________________  ===============>>  A S S E S S M E N T   A N D   P L A N <<===============  ------------------------------------------------------------------------------------------    · Assessment	  89yo 60kg f w pmh mci/dementia, HTN, HLD, pafib, constipation, gerd, p/w inability to ambulate following recent fall; in er, found to have right sup+inf pubic rami and right ischial tuberosity fractures a/w small hematoma over right anterolateral bladder wall; admit to medicine for further mgmt    Problem/Plan - 1:  ·  Problem: patient post fall with :      - R superior pubic ramus fx     - R inferior pubic ramus fx     - R ischium/ischial tuberosity fx     - R sacral ala age-indeterminate nondisplaced fracture     - Small pelvic hematoma  appreciated Ortho and  trauma follow up: no surgical intervention  WBAT / PT  pain management as needed   fall and fracture precautions  vitamin D levels >> 35.8  ? DEXA scan outpatient vs just give Fosamax as outpatient  >> Awaiting rehabilitation placement, pending authorization    ** Urology appreciated re bladder injury and pelvic hematoma     no evidence of bladder perforation     post TOV > monitor Urine output    Problem/Plan - 2:  ·  Problem: Fall.     h/o mci/dementia       trauma work up as above    likely mechanical in nature   Monitor mental status with frequent neurochecks  maintain falll and fracture, delirium, seizure, aspiration precautions; keep head end of bed elevated  cont home risperidone qhs + prn ativan  pt/ot eval + sw/cm consult for disposition  patient is DNR per prior conversation    Problem/Plan - 3:  ·  Problem: PAF (paroxysmal atrial fibrillation).   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    Problem/Plan - 4:  ·  Problem: HTN / HLD  ·  Plan: cont home norvasc  recently discontinued off of hctz given hypona  recently was dc'd off toprol upon discharge from recent hospitalization but resumed on toprol by outpatient provider; hold for now given 1st degree avb and bradycardia.  statin    Problem/Plan - 5:  ·  Problem: UTI  patient with allergies to PCN and Quinolon..   >>3 days of macribid ordered     Problem/Plan - 6:  ·  Problem: GERD (gastroesophageal reflux disease).   ·  Plan: cont home prilosec prn => protonix.    Problem/Plan - 7:  ·  Problem: Constipation.   ·  Plan: cont home bowel regimen.    -GI/DVT Prophylaxis per protocol.    DC planing to rehabilitation in process >> Pending authorization        ___________________________  H. GLENN Kelly.  Pager: [phone]

## 2023-11-10 NOTE — DISCHARGE NOTE PROVIDER - NSDCCPCAREPLAN_GEN_ALL_CORE_FT
PRINCIPAL DISCHARGE DIAGNOSIS  Diagnosis: Pelvic fracture  Assessment and Plan of Treatment: Weight bear as tolerated  Follow up with Dr Burton in 2 weeks, call to make an appointment.      SECONDARY DISCHARGE DIAGNOSES  Diagnosis: Acute UTI  Assessment and Plan of Treatment: You were treated with macrobid during your hospital stay.    Diagnosis: Atrial fibrillation  Assessment and Plan of Treatment: Atrial fibrillation is the most common heart rhythm problem & has the risk of stroke & heart attack  It helps if you control your blood pressure, not drink more than 1-2 alcohol drinks per day, cut down on caffeine, getting treatment for over active thyroid gland, & getting exercise  Call your doctor if you feel your heart racing or beating unusually, chest tightness or pain, lightheaded, faint, shortness of breath especially with exercise  It is important to take your heart medication as prescribed      Diagnosis: Hypertension  Assessment and Plan of Treatment: Low salt diet  Activity as tolerated.  Take all medication as prescribed.  Follow up with your medical doctor for routine blood pressure monitoring at your next visit.  Notify your doctor if you have any of the following symptoms:   Dizziness, Lightheadedness, Blurry vision, Headache, Chest pain, Shortness of breath

## 2023-11-10 NOTE — DISCHARGE NOTE PROVIDER - NSDCMRMEDTOKEN_GEN_ALL_CORE_FT
amiodarone 200 mg oral tablet: 1 tab(s) orally once a day  apixaban 2.5 mg oral tablet: 1 tab(s) orally 2 times a day  aspirin 81 mg oral tablet, chewable: 1 tab(s) orally once a day  Ativan 0.5 mg oral tablet: 1 tab(s) orally 2 times a day as needed for  anxiety  atorvastatin 10 mg oral tablet: 1 tab(s) orally once a day (at bedtime)  docusate sodium 100 mg oral capsule: 1 cap(s) orally 2 times a day  folic acid 1 mg oral tablet: 1 tab(s) orally once a day  Multiple Vitamins oral capsule: 1 cap(s) orally once a day. last dose11/28/2017  Norvasc 5 mg oral tablet: 1 tab(s) orally once a day  omeprazole 20 mg oral delayed release capsule: 1 cap(s) orally once a day as needed for abdominal pain  risperiDONE 0.5 mg oral tablet: 1 tab(s) orally once a day (at bedtime)  Timolol Maleate (Eqv-Timoptic) 0.5% ophthalmic solution: 1 drop(s) in each affected eye 2 times a day  Toprol-XL 50 mg oral tablet, extended release: 1 tab(s) orally once a day   acetaminophen 325 mg oral tablet: 2 tab(s) orally every 6 hours As needed Temp greater or equal to 38C (100.4F), Mild Pain (1 - 3)  aluminum hydroxide-magnesium hydroxide 200 mg-200 mg/5 mL oral suspension: 30 milliliter(s) orally every 4 hours As needed Dyspepsia  amiodarone 200 mg oral tablet: 1 tab(s) orally once a day  apixaban 2.5 mg oral tablet: 1 tab(s) orally 2 times a day  Ativan 0.5 mg oral tablet: 1 tab(s) orally 2 times a day as needed for  anxiety  atorvastatin 10 mg oral tablet: 1 tab(s) orally once a day (at bedtime)  benzonatate 100 mg oral capsule: 1 cap(s) orally 3 times a day As needed Cough  bisacodyl 5 mg oral delayed release tablet: 1 tab(s) orally once a day As needed Constipation  folic acid 1 mg oral tablet: 1 tab(s) orally once a day  guaiFENesin 100 mg/5 mL oral liquid: 10 milliliter(s) orally every 6 hours As needed Cough  lidocaine 4% topical film: Apply topically to affected area once a day 12 hours on, 12 hours off  melatonin 3 mg oral tablet: 1 tab(s) orally once a day (at bedtime) As needed Insomnia  Multiple Vitamins oral capsule: 1 cap(s) orally once a day. last dose11/28/2017  Norvasc 5 mg oral tablet: 1 tab(s) orally once a day  pantoprazole 40 mg oral delayed release tablet: 1 tab(s) orally once a day (before a meal) As needed gerd  polyethylene glycol 3350 oral powder for reconstitution: 17 gram(s) orally once a day  risperiDONE 0.5 mg oral tablet: 1 tab(s) orally once a day (at bedtime)  senna leaf extract oral tablet: 2 tab(s) orally once a day (at bedtime)  timolol maleate 0.5% ophthalmic solution: 1 drop(s) to each affected eye 2 times a day

## 2023-11-11 NOTE — PROGRESS NOTE ADULT - SUBJECTIVE AND OBJECTIVE BOX
date of service: 11-11-23 @ 13:28  afberile  REVIEW OF SYSTEMS:  CONSTITUTIONAL: No fever,  no  weight loss  ENT:  No  tinnitus,   no   vertigo  NECK: No pain or stiffness  RESPIRATORY: No cough, wheezing, chills or hemoptysis;    No Shortness of Breath  CARDIOVASCULAR: No chest pain, palpitations, dizziness  GASTROINTESTINAL: No abdominal or epigastric pain. No nausea, vomiting, or hematemesis; No diarrhea  No melena or hematochezia.  GENITOURINARY: No dysuria, frequency, hematuria, or incontinence  NEUROLOGICAL: No headaches  SKIN: No itching,  no   rash  LYMPH Nodes: No enlarged glands  ENDOCRINE: No heat or cold intolerance  MUSCULOSKELETAL: No joint pain or swelling  PSYCHIATRIC: No depression, anxiety  HEME/LYMPH: No easy bruising, or bleeding gums  ALLERGY AND IMMUNOLOGIC: No hives or eczema	    MEDICATIONS  (STANDING):  aMIOdarone    Tablet 200 milliGRAM(s) Oral daily  amLODIPine   Tablet 5 milliGRAM(s) Oral daily  apixaban 2.5 milliGRAM(s) Oral every 12 hours  atorvastatin 10 milliGRAM(s) Oral at bedtime  lidocaine   4% Patch 1 Patch Transdermal daily  naloxone Injectable 0.4 milliGRAM(s) IV Push once  nitrofurantoin monohydrate/macrocrystals (MACROBID) 100 milliGRAM(s) Oral two times a day  polyethylene glycol 3350 17 Gram(s) Oral daily  risperiDONE   Tablet 0.5 milliGRAM(s) Oral at bedtime  senna 2 Tablet(s) Oral at bedtime  timolol 0.5% Solution 1 Drop(s) Both EYES two times a day    MEDICATIONS  (PRN):  acetaminophen     Tablet .. 650 milliGRAM(s) Oral every 6 hours PRN Temp greater or equal to 38C (100.4F), Mild Pain (1 - 3)  aluminum hydroxide/magnesium hydroxide/simethicone Suspension 30 milliLiter(s) Oral every 4 hours PRN Dyspepsia  benzonatate 100 milliGRAM(s) Oral three times a day PRN Cough  bisacodyl 5 milliGRAM(s) Oral daily PRN Constipation  LORazepam     Tablet 0.5 milliGRAM(s) Oral every 12 hours PRN for anxiety  melatonin 3 milliGRAM(s) Oral at bedtime PRN Insomnia  ondansetron Injectable 4 milliGRAM(s) IV Push every 8 hours PRN Nausea and/or Vomiting  oxyCODONE    IR 5 milliGRAM(s) Oral every 4 hours PRN Severe Pain (7 - 10)  oxyCODONE    IR 2.5 milliGRAM(s) Oral every 4 hours PRN Moderate Pain (4 - 6)  pantoprazole    Tablet 40 milliGRAM(s) Oral before breakfast PRN gerd      Vital Signs Last 24 Hrs  T(C): 36.8 (11 Nov 2023 09:45), Max: 37.1 (11 Nov 2023 01:06)  T(F): 98.3 (11 Nov 2023 09:45), Max: 98.7 (11 Nov 2023 01:06)  HR: 65 (11 Nov 2023 09:45) (65 - 98)  BP: 109/70 (11 Nov 2023 09:45) (109/70 - 164/77)  BP(mean): --  RR: 18 (11 Nov 2023 09:45) (18 - 18)  SpO2: 91% (11 Nov 2023 09:45) (91% - 97%)    Parameters below as of 11 Nov 2023 09:45  Patient On (Oxygen Delivery Method): nasal cannula  O2 Flow (L/min): 3    CAPILLARY BLOOD GLUCOSE        I&O's Summary    10 Nov 2023 07:01  -  11 Nov 2023 07:00  --------------------------------------------------------  IN: 0 mL / OUT: 300 mL / NET: -300 mL          Appearance: Normal	  HEENT:   Normal oral mucosa, PERRL, EOMI	  Lymphatic: No lymphadenopathy  Cardiovascular: Normal S1 S2, No JVD  Respiratory: Lungs clear to auscultation	  Gastrointestinal:  Soft, Non-tender, + BS	  Skin: No rash, No ecchymoses	  Extremities:     LABS:                        9.7    9.52  )-----------( 216      ( 10 Nov 2023 06:47 )             30.1                           Thyroid Stimulating Hormone, Serum: 1.46 uIU/mL (11-06 @ 08:00)          Consultant(s) Notes Reviewed:      Care Discussed with Consultants/Other Providers:

## 2023-11-11 NOTE — PROGRESS NOTE ADULT - ASSESSMENT
89yo F      presenting after fall /  home      with pelvic fractures in the setting of a small hematoma adjacent to the bladder     CT cystogram, no signs of bladder perforation since no extravasation of contrast was noted      s/p  fall    c/c  afib. on  amio/  eliquis   seen by urology   uti/ E  coli,  on iv  rocephin   c/c anmeia

## 2023-11-12 NOTE — PROVIDER CONTACT NOTE (MEDICATION) - ACTION/TREATMENT ORDERED:
PASHA Richard was notified and aware pt has a consistent cough. Pasha Richard will come to assess pt.
CELESTINA Cage notified and aware pt has a persistent cough. Robitussin ordered, will administer as per order.

## 2023-11-12 NOTE — PROVIDER CONTACT NOTE (MEDICATION) - ASSESSMENT
Pt is AxOx1, confused but alert. Pt has a consistent non productive dry cough, on 3L NC, VSS.  B/L lung sounds are diminished with expiratory wheezing.  Pt denies pain or distress.
Pt AxOx2, denies chest pain, has a consistent non productive cough.  Pt on 3L NC, SPO2-94%.  Lung sounds, fine crackles bilaterally and expiratory wheezing.

## 2023-11-12 NOTE — PROGRESS NOTE ADULT - ASSESSMENT
_________________________________________________________________________________________  ========>>  M E D I C A L   A T T E N D I N G    F O L L O W  U P  N O T E  <<=========  -----------------------------------------------------------------------------------------------------    - Patient seen and examined by me earlier today.   - Patient today overall doing ok, comfortable, denies having any pain..      no major events reported / noted otherwise       patient otherwise doing well, took yogurt from me and ate..     ==================>> REVIEW OF SYSTEM <<=================    limited as patient poor historian   denies any pain or discomfort     ==================>> PHYSICAL EXAM <<=================    GEN: A&O X 1 , NAD , comfortable, pleasant, calm .. resting in bed   HEENT: NCAT, PERRL, MMM, hearing intact  CVS: S1S2 , regular , No M/R/G appreciated  PULM: CTA B/L,  no W/R/R appreciated.. limited exam as not taking deep breaths   ABD.: soft. non tender, non distended,  bowel sounds present  Extrem: intact pulses , no edema        ( Note written / Date of service 11-12-23 ( This is certified to be the same as "ENTERED" date above ( for billing purposes)))    ==================>> MEDICATIONS <<====================    aMIOdarone    Tablet 200 milliGRAM(s) Oral daily  amLODIPine   Tablet 5 milliGRAM(s) Oral daily  apixaban 2.5 milliGRAM(s) Oral every 12 hours  atorvastatin 10 milliGRAM(s) Oral at bedtime  lidocaine   4% Patch 1 Patch Transdermal daily  naloxone Injectable 0.4 milliGRAM(s) IV Push once  polyethylene glycol 3350 17 Gram(s) Oral daily  risperiDONE   Tablet 0.5 milliGRAM(s) Oral at bedtime  senna 2 Tablet(s) Oral at bedtime  timolol 0.5% Solution 1 Drop(s) Both EYES two times a day    MEDICATIONS  (PRN):  acetaminophen     Tablet .. 650 milliGRAM(s) Oral every 6 hours PRN Temp greater or equal to 38C (100.4F), Mild Pain (1 - 3)  aluminum hydroxide/magnesium hydroxide/simethicone Suspension 30 milliLiter(s) Oral every 4 hours PRN Dyspepsia  benzonatate 100 milliGRAM(s) Oral three times a day PRN Cough  bisacodyl 5 milliGRAM(s) Oral daily PRN Constipation  guaiFENesin Oral Liquid (Sugar-Free) 200 milliGRAM(s) Oral every 6 hours PRN Cough  LORazepam     Tablet 0.5 milliGRAM(s) Oral every 12 hours PRN for anxiety  melatonin 3 milliGRAM(s) Oral at bedtime PRN Insomnia  ondansetron Injectable 4 milliGRAM(s) IV Push every 8 hours PRN Nausea and/or Vomiting  oxyCODONE    IR 5 milliGRAM(s) Oral every 4 hours PRN Severe Pain (7 - 10)  oxyCODONE    IR 2.5 milliGRAM(s) Oral every 4 hours PRN Moderate Pain (4 - 6)  pantoprazole    Tablet 40 milliGRAM(s) Oral before breakfast PRN gerd    ___________  Active diet:  Diet, Regular  ___________________    ==================>> VITAL SIGNS <<==================    Vital Signs Last 24 HrsT(C): 36.6 (11-12-23 @ 08:09)  T(F): 97.8 (11-12-23 @ 08:09), Max: 98.4 (11-11-23 @ 20:33)  HR: 63 (11-12-23 @ 08:09) (62 - 72)  BP: 120/74 (11-12-23 @ 08:09)  RR: 18 (11-12-23 @ 08:09) (18 - 18)  SpO2: 93% (11-12-23 @ 08:09) (92% - 94%)       ==================>> LAB AND IMAGING <<==================       no labs today   \  WBC count:   9.52 <<== ,  8.91 <<== ,  8.70 <<==   Hemoglobin:   9.7 <<==,  9.1 <<==,  9.9 <<==  platelets:  216 <==, 182 <==, 182 <==, 166 <==    Creatinine:  0.93  <<==, 0.92  <<==, 0.94  <<==  Sodium:   136  <==, 141  <==, 140  <==    ____________________________    M I C R O B I O L O G Y :    Culture - Urine (collected 05 Nov 2023 15:53)  Source: Clean Catch Clean Catch (Midstream)  Final Report (08 Nov 2023 10:40):    >100,000 CFU/ml Escherichia coli    <10,000 CFU/ml Normal Urogenital simin present  Organism: Escherichia coli (08 Nov 2023 10:40)  Organism: Escherichia coli (08 Nov 2023 10:40)    Sensitivities:      Method Type: ROSENDO      -  Amoxicillin/Clavulanic Acid: S <=8/4      -  Ampicillin: R >16 These ampicillin results predict results for amoxicillin      -  Ampicillin/Sulbactam: I 16/8      -  Aztreonam: S <=4      -  Cefazolin: S <=2 For uncomplicated UTI with K. pneumoniae, E. coli, or P. mirablis: ROSENDO <=16 is sensitive and ROSENDO >=32 is resistant. This also predicts results for oral agents cefaclor, cefdinir, cefpodoxime, cefprozil, cefuroxime axetil, cephalexin and locarbef for uncomplicated UTI. Note that some isolates may be susceptible to these agents while testing resistant to cefazolin.      -  Cefepime: S <=2      -  Cefoxitin: S <=8      -  Ceftriaxone: S <=1      -  Cefuroxime: S <=4      -  Ciprofloxacin: S <=0.25      -  Ertapenem: S <=0.5      -  Gentamicin: S <=2      -  Imipenem: S <=1      -  Levofloxacin: S <=0.5      -  Meropenem: S <=1      -  Nitrofurantoin: S <=32 Should not be used to treat pyelonephritis      -  Piperacillin/Tazobactam: S <=8      -  Tobramycin: S <=2      -  Trimethoprim/Sulfamethoxazole: R >2/38    SARS-CoV-2: NotDete (11-07-23 @ 00:16)    ___________________________________________________________________________________  ===============>>  A S S E S S M E N T   A N D   P L A N <<===============  ------------------------------------------------------------------------------------------    · Assessment	  89yo 60kg f w pmh mci/dementia, HTN, HLD, pafib, constipation, gerd, p/w inability to ambulate following recent fall; in er, found to have right sup+inf pubic rami and right ischial tuberosity fractures a/w small hematoma over right anterolateral bladder wall; admit to medicine for further mgmt    Problem/Plan - 1:  ·  Problem: patient post fall with :      - R superior pubic ramus fx     - R inferior pubic ramus fx     - R ischium/ischial tuberosity fx     - R sacral ala age-indeterminate nondisplaced fracture     - Small pelvic hematoma  appreciated Ortho and  trauma follow up: no surgical intervention  WBAT / PT  pain management as needed   fall and fracture precautions  vitamin D levels >> 35.8  ? DEXA scan outpatient vs just give Fosamax as outpatient  >> Awaiting rehabilitation placement, pending authorization    ** Urology appreciated re bladder injury and pelvic hematoma     no evidence of bladder perforation     post TOV > monitor Urine output    Problem/Plan - 2:  ·  Problem: Fall.     h/o mci/dementia       trauma work up as above    likely mechanical in nature   Monitor mental status with frequent neurochecks  maintain falll and fracture, delirium, seizure, aspiration precautions; keep head end of bed elevated  cont home risperidone qhs + prn ativan  pt/ot eval + sw/cm consult for disposition  patient is DNR per prior conversation    Problem/Plan - 3:  ·  Problem: PAF (paroxysmal atrial fibrillation).   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    Problem/Plan - 4:  ·  Problem: HTN / HLD  ·  Plan: cont home norvasc  recently discontinued off of hctz given hypona  recently was dc'd off toprol upon discharge from recent hospitalization but resumed on toprol by outpatient provider; hold for now given 1st degree avb and bradycardia.  statin    Problem/Plan - 5:  ·  Problem: UTI  treated     Problem/Plan - 6:  ·  Problem: GERD (gastroesophageal reflux disease).   ·  Plan: cont home prilosec prn => protonix.    Problem/Plan - 7:  ·  Problem: Constipation.   ·  Plan: cont home bowel regimen.    -GI/DVT Prophylaxis per protocol.    DC planing to rehabilitation in process >> Pending authorization        ___________________________  H. GLENN Kelly.  Pager: [phone]

## 2023-11-12 NOTE — PROVIDER CONTACT NOTE (MEDICATION) - RECOMMENDATIONS
Notify CELESTINA Cage pt has a persistent cough. Robitussin medication, Tessalon given with no relief.
Notify CELESTINA Richard pt has a consistent cough.

## 2023-11-13 NOTE — PROGRESS NOTE ADULT - ASSESSMENT
_________________________________________________________________________________________  ========>>  M E D I C A L   A T T E N D I N G    F O L L O W  U P  N O T E  <<=========  -----------------------------------------------------------------------------------------------------    - Patient seen and examined by me earlier today.   - Patient today overall doing ok, comfortable, denies having any pain..      no major events reported / noted otherwise , eating well     ==================>> REVIEW OF SYSTEM <<=================    limited as patient poor historian   denies any pain or discomfort   no headache, no abd pain, no SOB    ==================>> PHYSICAL EXAM <<=================    GEN: A&O X 1 , NAD , comfortable, pleasant, calm ..  in bed   HEENT: NCAT, PERRL, MMM, hearing intact  CVS: S1S2 , regular , No M/R/G appreciated  PULM: CTA B/L,  no W/R/R appreciated.. limited exam as not taking deep breaths   ABD.: soft. non tender, non distended,  bowel sounds present  Extrem: intact pulses , no edema        ( Note written / Date of service 11-13-23 ( This is certified to be the same as "ENTERED" date above ( for billing purposes)))    ==================>> MEDICATIONS <<====================    aMIOdarone    Tablet 200 milliGRAM(s) Oral daily  amLODIPine   Tablet 5 milliGRAM(s) Oral daily  apixaban 2.5 milliGRAM(s) Oral every 12 hours  atorvastatin 10 milliGRAM(s) Oral at bedtime  lidocaine   4% Patch 1 Patch Transdermal daily  naloxone Injectable 0.4 milliGRAM(s) IV Push once  polyethylene glycol 3350 17 Gram(s) Oral daily  risperiDONE   Tablet 0.5 milliGRAM(s) Oral at bedtime  senna 2 Tablet(s) Oral at bedtime  timolol 0.5% Solution 1 Drop(s) Both EYES two times a day    MEDICATIONS  (PRN):  acetaminophen     Tablet .. 650 milliGRAM(s) Oral every 6 hours PRN Temp greater or equal to 38C (100.4F), Mild Pain (1 - 3)  aluminum hydroxide/magnesium hydroxide/simethicone Suspension 30 milliLiter(s) Oral every 4 hours PRN Dyspepsia  benzonatate 100 milliGRAM(s) Oral three times a day PRN Cough  bisacodyl 5 milliGRAM(s) Oral daily PRN Constipation  guaiFENesin Oral Liquid (Sugar-Free) 200 milliGRAM(s) Oral every 6 hours PRN Cough  LORazepam     Tablet 0.5 milliGRAM(s) Oral every 12 hours PRN for anxiety  melatonin 3 milliGRAM(s) Oral at bedtime PRN Insomnia  ondansetron Injectable 4 milliGRAM(s) IV Push every 8 hours PRN Nausea and/or Vomiting  pantoprazole    Tablet 40 milliGRAM(s) Oral before breakfast PRN gerd    ___________  Active diet:  Diet, Regular  ___________________    ==================>> VITAL SIGNS <<==================    Vital Signs Last 24 HrsT(C): 36.4 (11-13-23 @ 07:26)  T(F): 97.6 (11-13-23 @ 07:26), Max: 98.4 (11-12-23 @ 20:09)  HR: 61 (11-13-23 @ 07:26) (59 - 69)  BP: 120/77 (11-13-23 @ 07:26)  RR: 18 (11-13-23 @ 07:26) (18 - 18)  SpO2: 95% (11-13-23 @ 11:46) (92% - 95%)       ==================>> LAB AND IMAGING <<==================       no labs today     WBC count:   9.52 <<== ,  8.91 <<==   Hemoglobin:   9.7 <<==,  9.1 <<==  platelets:  216 <==, 182 <==, 182 <==    Creatinine:  0.93  <<==, 0.92  <<==  Sodium:   136  <==, 141  <==    ____________________________    M I C R O B I O L O G Y :    Culture - Urine (collected 05 Nov 2023 15:53)  Source: Clean Catch Clean Catch (Midstream)  Final Report (08 Nov 2023 10:40):    >100,000 CFU/ml Escherichia coli    <10,000 CFU/ml Normal Urogenital simin present  Organism: Escherichia coli (08 Nov 2023 10:40)  Organism: Escherichia coli (08 Nov 2023 10:40)    Sensitivities:      Method Type: ROSENDO      -  Amoxicillin/Clavulanic Acid: S <=8/4      -  Ampicillin: R >16 These ampicillin results predict results for amoxicillin      -  Ampicillin/Sulbactam: I 16/8      -  Aztreonam: S <=4      -  Cefazolin: S <=2 For uncomplicated UTI with K. pneumoniae, E. coli, or P. mirablis: ROSENDO <=16 is sensitive and ROSENDO >=32 is resistant. This also predicts results for oral agents cefaclor, cefdinir, cefpodoxime, cefprozil, cefuroxime axetil, cephalexin and locarbef for uncomplicated UTI. Note that some isolates may be susceptible to these agents while testing resistant to cefazolin.      -  Cefepime: S <=2      -  Cefoxitin: S <=8      -  Ceftriaxone: S <=1      -  Cefuroxime: S <=4      -  Ciprofloxacin: S <=0.25      -  Ertapenem: S <=0.5      -  Gentamicin: S <=2      -  Imipenem: S <=1      -  Levofloxacin: S <=0.5      -  Meropenem: S <=1      -  Nitrofurantoin: S <=32 Should not be used to treat pyelonephritis      -  Piperacillin/Tazobactam: S <=8      -  Tobramycin: S <=2      -  Trimethoprim/Sulfamethoxazole: R >2/38      ___________________________________________________________________________________  ===============>>  A S S E S S M E N T   A N D   P L A N <<===============  ------------------------------------------------------------------------------------------    · Assessment	  89yo 60kg f w pmh mci/dementia, HTN, HLD, pafib, constipation, gerd, p/w inability to ambulate following recent fall; in er, found to have right sup+inf pubic rami and right ischial tuberosity fractures a/w small hematoma over right anterolateral bladder wall; admit to medicine for further mgmt    Problem/Plan - 1:  ·  Problem: patient post fall with :      - R superior pubic ramus fx     - R inferior pubic ramus fx     - R ischium/ischial tuberosity fx     - R sacral ala age-indeterminate nondisplaced fracture     - Small pelvic hematoma  appreciated Ortho and  trauma follow up: no surgical intervention  WBAT / PT  pain management as needed   fall and fracture precautions  vitamin D levels >> 35.8  ? DEXA scan outpatient vs just give Fosamax as outpatient  >> Awaiting rehabilitation placement, pending authorization    ** Urology appreciated re bladder injury and pelvic hematoma     no evidence of bladder perforation     post TOV > monitor Urine output    Problem/Plan - 2:  ·  Problem: Fall.     h/o mci/dementia       trauma work up as above    likely mechanical in nature   Monitor mental status with frequent neurochecks  maintain falll and fracture, delirium, seizure, aspiration precautions; keep head end of bed elevated  cont home risperidone qhs + prn ativan  pt/ot eval + sw/cm consult for disposition  patient is DNR per prior conversation    Problem/Plan - 3:  ·  Problem: PAF (paroxysmal atrial fibrillation).   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    Problem/Plan - 4:  ·  Problem: HTN / HLD  ·  Plan: cont home norvasc  recently discontinued off of hctz given hypona  recently was dc'd off toprol upon discharge from recent hospitalization but resumed on toprol by outpatient provider; hold for now given 1st degree avb and bradycardia.  statin    Problem/Plan - 5:  ·  Problem: UTI  treated     Problem/Plan - 6:  ·  Problem: GERD (gastroesophageal reflux disease).   ·  Plan: cont home prilosec prn => protonix.    Problem/Plan - 7:  ·  Problem: Constipation.   ·  Plan: cont home bowel regimen.    -GI/DVT Prophylaxis per protocol.    DC planing to rehabilitation in process >> Pending authorization        ___________________________  MIGUEL Kelly D.O.  Pager: [phone]

## 2023-11-14 NOTE — DISCHARGE NOTE NURSING/CASE MANAGEMENT/SOCIAL WORK - PATIENT PORTAL LINK FT
You can access the FollowMyHealth Patient Portal offered by [facility] by registering at the following website: [email or web address]. By joining [employer]’s FollowMyHealth portal, you will also be able to view your health information using other applications (apps) compatible with our system.

## 2023-11-14 NOTE — PROGRESS NOTE ADULT - ASSESSMENT
_________________________________________________________________________________________  ========>>  M E D I C A L   A T T E N D I N G    F O L L O W  U P  N O T E  <<=========  -----------------------------------------------------------------------------------------------------    - Patient seen and examined by me earlier today.   - Patient today overall doing ok, comfortable, denies having any pain..      no major events reported / noted otherwise , eating well     ==================>> REVIEW OF SYSTEM <<=================    limited as patient poor historian   denies any pain or discomfort   no headache, no abd pain, no SOB    ==================>> PHYSICAL EXAM <<=================    GEN: A&O X 1 , NAD , comfortable, pleasant, calm ..  in bed   HEENT: NCAT, PERRL, MMM, hearing intact  CVS: S1S2 , regular , No M/R/G appreciated  PULM: CTA B/L,  no W/R/R appreciated.. limited exam as not taking deep breaths   ABD.: soft. non tender, non distended,  bowel sounds present  Extrem: intact pulses , no edema         ( Note written / Date of service 11-14-23 ( This is certified to be the same as "ENTERED" date above ( for billing purposes)))    ==================>> MEDICATIONS <<====================    aMIOdarone    Tablet 200 milliGRAM(s) Oral daily  amLODIPine   Tablet 5 milliGRAM(s) Oral daily  apixaban 2.5 milliGRAM(s) Oral every 12 hours  atorvastatin 10 milliGRAM(s) Oral at bedtime  lidocaine   4% Patch 1 Patch Transdermal daily  naloxone Injectable 0.4 milliGRAM(s) IV Push once  polyethylene glycol 3350 17 Gram(s) Oral daily  risperiDONE   Tablet 0.5 milliGRAM(s) Oral at bedtime  senna 2 Tablet(s) Oral at bedtime  timolol 0.5% Solution 1 Drop(s) Both EYES two times a day    MEDICATIONS  (PRN):  acetaminophen     Tablet .. 650 milliGRAM(s) Oral every 6 hours PRN Temp greater or equal to 38C (100.4F), Mild Pain (1 - 3)  aluminum hydroxide/magnesium hydroxide/simethicone Suspension 30 milliLiter(s) Oral every 4 hours PRN Dyspepsia  benzonatate 100 milliGRAM(s) Oral three times a day PRN Cough  bisacodyl 5 milliGRAM(s) Oral daily PRN Constipation  guaiFENesin Oral Liquid (Sugar-Free) 200 milliGRAM(s) Oral every 6 hours PRN Cough  LORazepam     Tablet 0.5 milliGRAM(s) Oral every 12 hours PRN for anxiety  melatonin 3 milliGRAM(s) Oral at bedtime PRN Insomnia  ondansetron Injectable 4 milliGRAM(s) IV Push every 8 hours PRN Nausea and/or Vomiting  pantoprazole    Tablet 40 milliGRAM(s) Oral before breakfast PRN gerd    ___________  Active diet:  Diet, Regular  ___________________    ==================>> VITAL SIGNS <<==================    Vital Signs Last 24 HrsT(C): 36.6 (11-14-23 @ 07:45)  T(F): 97.8 (11-14-23 @ 07:45), Max: 99 (11-13-23 @ 20:31)  HR: 59 (11-14-23 @ 07:45) (56 - 67)  BP: 110/63 (11-14-23 @ 07:45)  RR: 18 (11-14-23 @ 07:45) (18 - 18)  SpO2: 99% (11-14-23 @ 07:45) (91% - 99%)      CAPILLARY BLOOD GLUCOSE         ==================>> LAB AND IMAGING <<==================             WBC count:   9.52 <<==   Hemoglobin:   9.7 <<==  platelets:  216 <==, 182 <==    Creatinine:  0.93  <<==  Sodium:   136  <==       AST:               ALT:             AP:             Bili:            ____________________________    M I C R O B I O L O G Y :    Culture - Urine (collected 05 Nov 2023 15:53)  Source: Clean Catch Clean Catch (Midstream)  Final Report (08 Nov 2023 10:40):    >100,000 CFU/ml Escherichia coli    <10,000 CFU/ml Normal Urogenital simin present  Organism: Escherichia coli (08 Nov 2023 10:40)  Organism: Escherichia coli (08 Nov 2023 10:40)    Sensitivities:      Method Type: ROSENDO      -  Amoxicillin/Clavulanic Acid: S <=8/4      -  Ampicillin: R >16 These ampicillin results predict results for amoxicillin      -  Ampicillin/Sulbactam: I 16/8      -  Aztreonam: S <=4      -  Cefazolin: S <=2 For uncomplicated UTI with K. pneumoniae, E. coli, or P. mirablis: ROSENDO <=16 is sensitive and ROSENDO >=32 is resistant. This also predicts results for oral agents cefaclor, cefdinir, cefpodoxime, cefprozil, cefuroxime axetil, cephalexin and locarbef for uncomplicated UTI. Note that some isolates may be susceptible to these agents while testing resistant to cefazolin.      -  Cefepime: S <=2      -  Cefoxitin: S <=8      -  Ceftriaxone: S <=1      -  Cefuroxime: S <=4      -  Ciprofloxacin: S <=0.25      -  Ertapenem: S <=0.5      -  Gentamicin: S <=2      -  Imipenem: S <=1      -  Levofloxacin: S <=0.5      -  Meropenem: S <=1      -  Nitrofurantoin: S <=32 Should not be used to treat pyelonephritis      -  Piperacillin/Tazobactam: S <=8      -  Tobramycin: S <=2      -  Trimethoprim/Sulfamethoxazole: R >2/38        COVID-19 PCR: NotDetec (11-14-23 @ 10:55)  SARS-CoV-2: NotDetec (11-07-23 @ 00:16)      ___________________________________________________________________________________  ===============>>  A S S E S S M E N T   A N D   P L A N <<===============  ------------------------------------------------------------------------------------------    · Assessment	  89yo 60kg f w pmh mci/dementia, HTN, HLD, pafib, constipation, gerd, p/w inability to ambulate following recent fall; in er, found to have right sup+inf pubic rami and right ischial tuberosity fractures a/w small hematoma over right anterolateral bladder wall; admit to medicine for further mgmt    Problem/Plan - 1:  ·  Problem: patient post fall with :      - R superior pubic ramus fx     - R inferior pubic ramus fx     - R ischium/ischial tuberosity fx     - R sacral ala age-indeterminate nondisplaced fracture     - Small pelvic hematoma  appreciated Ortho and  trauma follow up: no surgical intervention  WBAT / PT  pain management as needed   fall and fracture precautions  vitamin D levels >> 35.8  ? DEXA scan outpatient vs just give Fosamax as outpatient  >> Awaiting rehabilitation placement, pending authorization    ** Urology appreciated re bladder injury and pelvic hematoma     no evidence of bladder perforation     post TOV > monitor Urine output    Problem/Plan - 2:  ·  Problem: Fall.     h/o mci/dementia       trauma work up as above    likely mechanical in nature   Monitor mental status with frequent neurochecks  maintain falll and fracture, delirium, seizure, aspiration precautions; keep head end of bed elevated  cont home risperidone qhs + prn ativan  pt/ot eval + sw/cm consult for disposition  patient is DNR per prior conversation    Problem/Plan - 3:  ·  Problem: PAF (paroxysmal atrial fibrillation).   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    Problem/Plan - 4:  ·  Problem: HTN / HLD  ·  Plan: cont home norvasc  recently discontinued off of hctz given hypona  recently was dc'd off toprol upon discharge from recent hospitalization but resumed on toprol by outpatient provider; hold for now given 1st degree avb and bradycardia.  statin    Problem/Plan - 5:  ·  Problem: UTI  treated     Problem/Plan - 6:  ·  Problem: GERD (gastroesophageal reflux disease).   ·  Plan: cont home prilosec prn => protonix.    Problem/Plan - 7:  ·  Problem: Constipation.   ·  Plan: cont home bowel regimen.    -GI/DVT Prophylaxis per protocol.    BERNARD planing to rehabilitation in process >> Pending authorization        ___________________________  MIGUEL Kelly D.O.  Pager: [phone]       _________________________________________________________________________________________  ========>>  M E D I C A L   A T T E N D I N G    F O L L O W  U P  N O T E  <<=========  -----------------------------------------------------------------------------------------------------    - Patient seen and examined by me earlier today.   - Patient today overall doing ok, comfortable, denies having any pain..      no major events reported / noted otherwise , eating well     ==================>> REVIEW OF SYSTEM <<=================    limited as patient poor historian   denies any pain or discomfort   no headache, no abd pain, no SOB    ==================>> PHYSICAL EXAM <<=================    GEN: A&O X 1 , NAD , comfortable, pleasant, calm ..  in bed   HEENT: NCAT, PERRL, MMM, hearing intact  CVS: S1S2 , regular , No M/R/G appreciated  PULM: CTA B/L,  no W/R/R appreciated.. limited exam as not taking deep breaths   ABD.: soft. non tender, non distended,  bowel sounds present  Extrem: intact pulses , no edema         ( Note written / Date of service 11-14-23 ( This is certified to be the same as "ENTERED" date above ( for billing purposes)))    ==================>> MEDICATIONS <<====================    aMIOdarone    Tablet 200 milliGRAM(s) Oral daily  amLODIPine   Tablet 5 milliGRAM(s) Oral daily  apixaban 2.5 milliGRAM(s) Oral every 12 hours  atorvastatin 10 milliGRAM(s) Oral at bedtime  lidocaine   4% Patch 1 Patch Transdermal daily  naloxone Injectable 0.4 milliGRAM(s) IV Push once  polyethylene glycol 3350 17 Gram(s) Oral daily  risperiDONE   Tablet 0.5 milliGRAM(s) Oral at bedtime  senna 2 Tablet(s) Oral at bedtime  timolol 0.5% Solution 1 Drop(s) Both EYES two times a day    MEDICATIONS  (PRN):  acetaminophen     Tablet .. 650 milliGRAM(s) Oral every 6 hours PRN Temp greater or equal to 38C (100.4F), Mild Pain (1 - 3)  aluminum hydroxide/magnesium hydroxide/simethicone Suspension 30 milliLiter(s) Oral every 4 hours PRN Dyspepsia  benzonatate 100 milliGRAM(s) Oral three times a day PRN Cough  bisacodyl 5 milliGRAM(s) Oral daily PRN Constipation  guaiFENesin Oral Liquid (Sugar-Free) 200 milliGRAM(s) Oral every 6 hours PRN Cough  LORazepam     Tablet 0.5 milliGRAM(s) Oral every 12 hours PRN for anxiety  melatonin 3 milliGRAM(s) Oral at bedtime PRN Insomnia  ondansetron Injectable 4 milliGRAM(s) IV Push every 8 hours PRN Nausea and/or Vomiting  pantoprazole    Tablet 40 milliGRAM(s) Oral before breakfast PRN gerd    ___________  Active diet:  Diet, Regular  ___________________    ==================>> VITAL SIGNS <<==================    Vital Signs Last 24 HrsT(C): 36.6 (11-14-23 @ 07:45)  T(F): 97.8 (11-14-23 @ 07:45), Max: 99 (11-13-23 @ 20:31)  HR: 59 (11-14-23 @ 07:45) (56 - 67)  BP: 110/63 (11-14-23 @ 07:45)  RR: 18 (11-14-23 @ 07:45) (18 - 18)  SpO2: 99% (11-14-23 @ 07:45) (91% - 99%)         ==================>> LAB AND IMAGING <<==================         no labs today    ____________________________    M I C R O B I O L O G Y :  Culture - Urine (collected 05 Nov 2023 15:53)  Source: Clean Catch Clean Catch (Midstream)  Final Report (08 Nov 2023 10:40):    >100,000 CFU/ml Escherichia coli    <10,000 CFU/ml Normal Urogenital simin present  Organism: Escherichia coli (08 Nov 2023 10:40)  Organism: Escherichia coli (08 Nov 2023 10:40)    Sensitivities:      Method Type: ROSENDO      -  Amoxicillin/Clavulanic Acid: S <=8/4      -  Ampicillin: R >16 These ampicillin results predict results for amoxicillin      -  Ampicillin/Sulbactam: I 16/8      -  Aztreonam: S <=4      -  Cefazolin: S <=2 For uncomplicated UTI with K. pneumoniae, E. coli, or P. mirablis: ROSENDO <=16 is sensitive and ROSENDO >=32 is resistant. This also predicts results for oral agents cefaclor, cefdinir, cefpodoxime, cefprozil, cefuroxime axetil, cephalexin and locarbef for uncomplicated UTI. Note that some isolates may be susceptible to these agents while testing resistant to cefazolin.      -  Cefepime: S <=2      -  Cefoxitin: S <=8      -  Ceftriaxone: S <=1      -  Cefuroxime: S <=4      -  Ciprofloxacin: S <=0.25      -  Ertapenem: S <=0.5      -  Gentamicin: S <=2      -  Imipenem: S <=1      -  Levofloxacin: S <=0.5      -  Meropenem: S <=1      -  Nitrofurantoin: S <=32 Should not be used to treat pyelonephritis      -  Piperacillin/Tazobactam: S <=8      -  Tobramycin: S <=2      -  Trimethoprim/Sulfamethoxazole: R >2/38    COVID-19 PCR: NotDetec (11-14-23 @ 10:55)  SARS-CoV-2: NotDetec (11-07-23 @ 00:16)      ___________________________________________________________________________________  ===============>>  A S S E S S M E N T   A N D   P L A N <<===============  ------------------------------------------------------------------------------------------    · Assessment	  89yo 60kg f w pmh mci/dementia, HTN, HLD, pafib, constipation, gerd, p/w inability to ambulate following recent fall; in er, found to have right sup+inf pubic rami and right ischial tuberosity fractures a/w small hematoma over right anterolateral bladder wall; admit to medicine for further mgmt    Problem/Plan - 1:  ·  Problem: patient post fall with :      - R superior pubic ramus fx     - R inferior pubic ramus fx     - R ischium/ischial tuberosity fx     - R sacral ala age-indeterminate nondisplaced fracture     - Small pelvic hematoma  appreciated Ortho and  trauma follow up: no surgical intervention  WBAT / PT  pain management as needed   fall and fracture precautions  vitamin D levels >> 35.8  ? DEXA scan outpatient vs just give Fosamax as outpatient  >> Awaiting rehabilitation placement, pending authorization    ** Urology appreciated re bladder injury and pelvic hematoma     no evidence of bladder perforation     post TOV > monitor Urine output    Problem/Plan - 2:  ·  Problem: Fall.     h/o mci/dementia       trauma work up as above    likely mechanical in nature   Monitor mental status with frequent neurochecks  maintain falll and fracture, delirium, seizure, aspiration precautions; keep head end of bed elevated  cont home risperidone qhs + prn ativan  pt/ot eval + sw/cm consult for disposition  patient is DNR per prior conversation    Problem/Plan - 3:  ·  Problem: PAF (paroxysmal atrial fibrillation).   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    Problem/Plan - 4:  ·  Problem: HTN / HLD  ·  Plan: cont home norvasc  recently discontinued off of hctz given hypona  recently was dc'd off toprol upon discharge from recent hospitalization but resumed on toprol by outpatient provider; hold for now given 1st degree avb and bradycardia.  statin    Problem/Plan - 5:  ·  Problem: UTI  treated     Problem/Plan - 6:  ·  Problem: GERD (gastroesophageal reflux disease).   ·  Plan: cont home prilosec prn => protonix.    Problem/Plan - 7:  ·  Problem: Constipation.   ·  Plan: cont home bowel regimen.    -GI/DVT Prophylaxis per protocol.    DC planing to rehabilitation in process >> Pending authorization        ___________________________  MIGUEL Kelly D.O.  Pager: [phone]

## 2023-11-14 NOTE — PROGRESS NOTE ADULT - REASON FOR ADMISSION
inability to ambulate following recent fall

## 2023-11-14 NOTE — DISCHARGE NOTE NURSING/CASE MANAGEMENT/SOCIAL WORK - NSDCPEFALRISK_GEN_ALL_CORE
For information on Fall & Injury Prevention, visit: [email or web address] OR  [email or web address] OR  https://www.cdc.gov/steadi/patient.html

## 2023-11-14 NOTE — PROGRESS NOTE ADULT - PROVIDER SPECIALTY LIST ADULT
Internal Medicine
Trauma Surgery
Internal Medicine
Urology
Urology
Internal Medicine
Urology

## 2024-01-26 ENCOUNTER — APPOINTMENT (OUTPATIENT)
Dept: INTERNAL MEDICINE | Facility: CLINIC | Age: 89
End: 2024-01-26

## 2024-07-19 NOTE — END OF VISIT
no anesthesia complications [Time Spent: ___ minutes] : I have spent [unfilled] minutes of face to face time with the patient

## 2024-09-18 NOTE — ED ADULT TRIAGE NOTE - CHIEF COMPLAINT QUOTE
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    Anesthesia Type:   MAC     Note:    Oropharynx: oropharynx clear of all foreign objects  Level of Consciousness: awake  Oxygen Supplementation: room air    Independent Airway: airway patency satisfactory and stable  Dentition: dentition unchanged  Vital Signs Stable: post-procedure vital signs reviewed and stable  Report to RN Given: handoff report given  Patient transferred to: Phase II  Comments: To Phase II. Report to RN.  VSS Resp status stable.  Handoff Report: Identifed the Patient, Identified the Reponsible Provider, Reviewed the pertinent medical history, Discussed the surgical course, Reviewed Intra-OP anesthesia mangement and issues during anesthesia, Set expectations for post-procedure period and Allowed opportunity for questions and acknowledgement of understanding      Vitals:  Vitals Value Taken Time   /84 09/18/24 1021   Temp     Pulse 75 09/18/24 1021   Resp     SpO2 98 % 09/18/24 1023   Vitals shown include unfiled device data.    Electronically Signed By: MARICRUZ Helms CRNA  September 18, 2024  10:24 AM  
trip and fall today, c/o left knee pain, +pulse to left foot, no numbness or tingling.

## 2025-07-21 NOTE — H&P ADULT - PROBLEM/PLAN-6
"Name: Tara Velasquez      : 1949      MRN: [id number]  Encounter Provider: Shalonda De León DO  Encounter Date: 2025   Encounter department: [facility]    :  Assessment & Plan  Primary hypertension  Stable on current meds       Acquired hypothyroidism  Increase dose  Orders:  •  levothyroxine 175 mcg tablet; Take 1 tablet (175 mcg total) by mouth daily in the early morning  •  TSH, 3rd generation with Free T4 reflex; Future    Mixed hyperlipidemia  Cont current meds         Assessment & Plan             History of Present Illness     History of Present Illness  Here for follow up  Labs reviewed  Increased fatigue     Review of Systems   Constitutional:  Positive for fatigue.   HENT: Negative.     Eyes: Negative.    Respiratory: Negative.     Cardiovascular: Negative.    Gastrointestinal: Negative.    Endocrine: Negative.    Genitourinary: Negative.    Musculoskeletal: Negative.    Allergic/Immunologic: Negative.    Neurological: Negative.    Hematological: Negative.    Psychiatric/Behavioral: Negative.       Objective   /80 (BP Location: Left arm, Patient Position: Sitting, Cuff Size: Standard)   Pulse 73   Temp (!) 97.1 °F (36.2 °C) (Tympanic)   Resp 16   Ht 5' 6\" (1.676 m)   Wt 84.1 kg (185 lb 6.4 oz)   SpO2 96%   BMI 29.92 kg/m²     Physical Exam    Physical Exam  Vitals and nursing note reviewed.   Constitutional:       Appearance: Normal appearance. She is well-developed.   HENT:      Head: Normocephalic and atraumatic.      Right Ear: External ear normal.      Left Ear: External ear normal.      Nose: Nose normal.     Eyes:      Conjunctiva/sclera: Conjunctivae normal.      Pupils: Pupils are equal, round, and reactive to light.       Cardiovascular:      Rate and Rhythm: Normal rate and regular rhythm.      Heart sounds: Normal heart sounds.   Pulmonary:      Effort: Pulmonary effort is normal.      Breath sounds: Normal breath sounds.   Abdominal:      General: " Bowel sounds are normal.      Palpations: Abdomen is soft.     Musculoskeletal:         General: Normal range of motion.      Cervical back: Normal range of motion and neck supple.     Skin:     General: Skin is warm and dry.      Capillary Refill: Capillary refill takes less than 2 seconds.     Neurological:      Mental Status: She is alert and oriented to person, place, and time.     Psychiatric:         Behavior: Behavior normal.         Thought Content: Thought content normal.         Judgment: Judgment normal.          DISPLAY PLAN FREE TEXT